# Patient Record
Sex: FEMALE | Race: WHITE | Employment: OTHER | URBAN - METROPOLITAN AREA
[De-identification: names, ages, dates, MRNs, and addresses within clinical notes are randomized per-mention and may not be internally consistent; named-entity substitution may affect disease eponyms.]

---

## 2017-01-04 ENCOUNTER — OFFICE VISIT - HEALTHEAST (OUTPATIENT)
Dept: INTERNAL MEDICINE | Facility: CLINIC | Age: 62
End: 2017-01-04

## 2017-01-04 DIAGNOSIS — M85.80 OSTEOPENIA: ICD-10-CM

## 2017-01-04 DIAGNOSIS — E03.9 HYPOTHYROIDISM: ICD-10-CM

## 2017-01-04 DIAGNOSIS — J01.90 ACUTE SINUS INFECTION: ICD-10-CM

## 2017-01-04 DIAGNOSIS — R53.83 FATIGUE: ICD-10-CM

## 2017-01-04 ASSESSMENT — MIFFLIN-ST. JEOR: SCORE: 1334.92

## 2017-01-06 ENCOUNTER — COMMUNICATION - HEALTHEAST (OUTPATIENT)
Dept: INTERNAL MEDICINE | Facility: CLINIC | Age: 62
End: 2017-01-06

## 2017-01-10 ENCOUNTER — RECORDS - HEALTHEAST (OUTPATIENT)
Dept: ADMINISTRATIVE | Facility: OTHER | Age: 62
End: 2017-01-10

## 2017-01-10 ENCOUNTER — HOSPITAL ENCOUNTER (OUTPATIENT)
Dept: MAMMOGRAPHY | Facility: CLINIC | Age: 62
Discharge: HOME | End: 2017-01-10
Attending: OBSTETRICS & GYNECOLOGY | Admitting: OBSTETRICS & GYNECOLOGY
Payer: COMMERCIAL

## 2017-01-10 DIAGNOSIS — Z12.31 VISIT FOR SCREENING MAMMOGRAM: ICD-10-CM

## 2017-01-10 PROCEDURE — 77063 BREAST TOMOSYNTHESIS BI: CPT

## 2017-02-08 ENCOUNTER — AMBULATORY - HEALTHEAST (OUTPATIENT)
Dept: NURSING | Facility: CLINIC | Age: 62
End: 2017-02-08

## 2017-02-13 ENCOUNTER — RECORDS - HEALTHEAST (OUTPATIENT)
Dept: ADMINISTRATIVE | Facility: OTHER | Age: 62
End: 2017-02-13

## 2017-02-13 ENCOUNTER — RECORDS - HEALTHEAST (OUTPATIENT)
Dept: BONE DENSITY | Facility: CLINIC | Age: 62
End: 2017-02-13

## 2017-02-13 DIAGNOSIS — M85.80 OTHER SPECIFIED DISORDERS OF BONE DENSITY AND STRUCTURE, UNSPECIFIED SITE: ICD-10-CM

## 2017-02-20 ENCOUNTER — COMMUNICATION - HEALTHEAST (OUTPATIENT)
Dept: INTERNAL MEDICINE | Facility: CLINIC | Age: 62
End: 2017-02-20

## 2017-02-27 ENCOUNTER — AMBULATORY - HEALTHEAST (OUTPATIENT)
Dept: INTERNAL MEDICINE | Facility: CLINIC | Age: 62
End: 2017-02-27

## 2017-02-27 ENCOUNTER — COMMUNICATION - HEALTHEAST (OUTPATIENT)
Dept: INTERNAL MEDICINE | Facility: CLINIC | Age: 62
End: 2017-02-27

## 2017-03-03 ENCOUNTER — AMBULATORY - HEALTHEAST (OUTPATIENT)
Dept: NURSING | Facility: CLINIC | Age: 62
End: 2017-03-03

## 2017-03-24 ENCOUNTER — AMBULATORY - HEALTHEAST (OUTPATIENT)
Dept: NURSING | Facility: CLINIC | Age: 62
End: 2017-03-24

## 2017-04-24 ENCOUNTER — TRANSFERRED RECORDS (OUTPATIENT)
Dept: HEALTH INFORMATION MANAGEMENT | Facility: CLINIC | Age: 62
End: 2017-04-24

## 2017-04-24 ENCOUNTER — RECORDS - HEALTHEAST (OUTPATIENT)
Dept: ADMINISTRATIVE | Facility: OTHER | Age: 62
End: 2017-04-24

## 2017-04-25 ENCOUNTER — TRANSFERRED RECORDS (OUTPATIENT)
Dept: FAMILY MEDICINE | Facility: CLINIC | Age: 62
End: 2017-04-25

## 2017-04-27 ENCOUNTER — RECORDS - HEALTHEAST (OUTPATIENT)
Dept: ADMINISTRATIVE | Facility: OTHER | Age: 62
End: 2017-04-27

## 2017-05-05 ENCOUNTER — OFFICE VISIT - HEALTHEAST (OUTPATIENT)
Dept: INTERNAL MEDICINE | Facility: CLINIC | Age: 62
End: 2017-05-05

## 2017-05-05 DIAGNOSIS — I49.9 IRREGULAR HEART BEAT: ICD-10-CM

## 2017-05-05 DIAGNOSIS — E03.9 HYPOTHYROIDISM: ICD-10-CM

## 2017-05-05 DIAGNOSIS — R06.00 DYSPNEA: ICD-10-CM

## 2017-05-05 LAB
CHOLEST SERPL-MCNC: 245 MG/DL
FASTING STATUS PATIENT QL REPORTED: YES
HDLC SERPL-MCNC: 53 MG/DL
LDLC SERPL CALC-MCNC: 147 MG/DL
TRIGL SERPL-MCNC: 223 MG/DL

## 2017-05-05 ASSESSMENT — MIFFLIN-ST. JEOR: SCORE: 1375.38

## 2017-05-06 LAB
ATRIAL RATE - MUSE: 99 BPM
DIASTOLIC BLOOD PRESSURE - MUSE: NORMAL MMHG
INTERPRETATION ECG - MUSE: NORMAL
P AXIS - MUSE: 73 DEGREES
PR INTERVAL - MUSE: 160 MS
QRS DURATION - MUSE: 90 MS
QT - MUSE: 402 MS
QTC - MUSE: 515 MS
R AXIS - MUSE: -21 DEGREES
SYSTOLIC BLOOD PRESSURE - MUSE: NORMAL MMHG
T AXIS - MUSE: 84 DEGREES
VENTRICULAR RATE- MUSE: 99 BPM

## 2017-05-08 ENCOUNTER — OFFICE VISIT - HEALTHEAST (OUTPATIENT)
Dept: CARDIOLOGY | Facility: CLINIC | Age: 62
End: 2017-05-08

## 2017-05-08 ENCOUNTER — COMMUNICATION - HEALTHEAST (OUTPATIENT)
Dept: INTERNAL MEDICINE | Facility: CLINIC | Age: 62
End: 2017-05-08

## 2017-05-08 DIAGNOSIS — E78.5 HYPERLIPIDEMIA: ICD-10-CM

## 2017-05-08 DIAGNOSIS — I20.9 ISCHEMIC CHEST PAIN (H): ICD-10-CM

## 2017-05-08 DIAGNOSIS — I10 ESSENTIAL HYPERTENSION: ICD-10-CM

## 2017-05-08 DIAGNOSIS — R00.2 HEART PALPITATIONS: ICD-10-CM

## 2017-05-08 ASSESSMENT — MIFFLIN-ST. JEOR: SCORE: 1366.31

## 2017-05-11 ENCOUNTER — COMMUNICATION - HEALTHEAST (OUTPATIENT)
Dept: INTERNAL MEDICINE | Facility: CLINIC | Age: 62
End: 2017-05-11

## 2017-05-15 ENCOUNTER — HOSPITAL ENCOUNTER (OUTPATIENT)
Dept: CARDIOLOGY | Facility: CLINIC | Age: 62
Discharge: HOME | End: 2017-05-15
Attending: INTERNAL MEDICINE

## 2017-05-15 DIAGNOSIS — I10 ESSENTIAL HYPERTENSION: ICD-10-CM

## 2017-05-15 DIAGNOSIS — I20.9 ISCHEMIC CHEST PAIN (H): ICD-10-CM

## 2017-05-15 DIAGNOSIS — R00.2 HEART PALPITATIONS: ICD-10-CM

## 2017-05-15 DIAGNOSIS — E78.5 HYPERLIPIDEMIA: ICD-10-CM

## 2017-05-16 LAB
CV STRESS CURRENT BP HE: NORMAL
CV STRESS CURRENT HR HE: 100
CV STRESS CURRENT HR HE: 100
CV STRESS CURRENT HR HE: 101
CV STRESS CURRENT HR HE: 101
CV STRESS CURRENT HR HE: 103
CV STRESS CURRENT HR HE: 103
CV STRESS CURRENT HR HE: 110
CV STRESS CURRENT HR HE: 114
CV STRESS CURRENT HR HE: 116
CV STRESS CURRENT HR HE: 116
CV STRESS CURRENT HR HE: 120
CV STRESS CURRENT HR HE: 124
CV STRESS CURRENT HR HE: 125
CV STRESS CURRENT HR HE: 131
CV STRESS CURRENT HR HE: 132
CV STRESS CURRENT HR HE: 135
CV STRESS CURRENT HR HE: 136
CV STRESS CURRENT HR HE: 137
CV STRESS CURRENT HR HE: 140
CV STRESS CURRENT HR HE: 146
CV STRESS CURRENT HR HE: 147
CV STRESS CURRENT HR HE: 148
CV STRESS CURRENT HR HE: 155
CV STRESS CURRENT HR HE: 95
CV STRESS CURRENT HR HE: 96
CV STRESS CURRENT HR HE: 96
CV STRESS DEVIATION TIME HE: NORMAL
CV STRESS ECHO PERCENT HR HE: NORMAL
CV STRESS EXERCISE STAGE HE: NORMAL
CV STRESS FINAL RESTING BP HE: NORMAL
CV STRESS FINAL RESTING HR HE: 95
CV STRESS MAX HR HE: 155
CV STRESS MAX TREADMILL GRADE HE: 14
CV STRESS MAX TREADMILL SPEED HE: 3.4
CV STRESS PEAK DIA BP HE: NORMAL
CV STRESS PEAK SYS BP HE: NORMAL
CV STRESS PHASE HE: NORMAL
CV STRESS PROTOCOL HE: NORMAL
CV STRESS RESTING PT POSITION HE: NORMAL
CV STRESS RESTING PT POSITION HE: NORMAL
CV STRESS ST DEVIATION AMOUNT HE: NORMAL
CV STRESS ST DEVIATION ELEVATION HE: NORMAL
CV STRESS ST EVELATION AMOUNT HE: NORMAL
CV STRESS TEST TYPE HE: NORMAL
CV STRESS TOTAL STAGE TIME MIN 1 HE: NORMAL
ECHO EJECTION FRACTION ESTIMATED: 60 %
STRESS ECHO BASELINE BP: NORMAL
STRESS ECHO BASELINE HR: 112
STRESS ECHO CALCULATED PERCENT HR: 97 %
STRESS ECHO LAST STRESS BP: NORMAL
STRESS ECHO LAST STRESS HR: 146
STRESS ECHO POST ESTIMATED WORKLOAD: 8.5
STRESS ECHO POST EXERCISE DUR MIN: 7
STRESS ECHO POST EXERCISE DUR SEC: 0
STRESS ECHO TARGET HR: 135

## 2017-05-17 ENCOUNTER — AMBULATORY - HEALTHEAST (OUTPATIENT)
Dept: CARDIOLOGY | Facility: CLINIC | Age: 62
End: 2017-05-17

## 2017-05-17 DIAGNOSIS — I47.19 ATRIAL TACHYCARDIA DETERMINED BY ELECTROCARDIOGRAPHY (H): ICD-10-CM

## 2017-05-19 ENCOUNTER — OFFICE VISIT - HEALTHEAST (OUTPATIENT)
Dept: INTERNAL MEDICINE | Facility: CLINIC | Age: 62
End: 2017-05-19

## 2017-05-19 DIAGNOSIS — M54.9 BACK PAIN: ICD-10-CM

## 2017-05-19 DIAGNOSIS — I49.8 ATRIAL ARRHYTHMIA: ICD-10-CM

## 2017-05-19 ASSESSMENT — MIFFLIN-ST. JEOR: SCORE: 1375.38

## 2017-06-06 ENCOUNTER — RECORDS - HEALTHEAST (OUTPATIENT)
Dept: ADMINISTRATIVE | Facility: OTHER | Age: 62
End: 2017-06-06

## 2017-06-06 ENCOUNTER — AMBULATORY - HEALTHEAST (OUTPATIENT)
Dept: NURSING | Facility: CLINIC | Age: 62
End: 2017-06-06

## 2017-06-07 ENCOUNTER — COMMUNICATION - HEALTHEAST (OUTPATIENT)
Dept: INTERNAL MEDICINE | Facility: CLINIC | Age: 62
End: 2017-06-07

## 2017-06-09 ENCOUNTER — RECORDS - HEALTHEAST (OUTPATIENT)
Dept: ADMINISTRATIVE | Facility: OTHER | Age: 62
End: 2017-06-09

## 2017-06-19 ENCOUNTER — COMMUNICATION - HEALTHEAST (OUTPATIENT)
Dept: INTERNAL MEDICINE | Facility: CLINIC | Age: 62
End: 2017-06-19

## 2017-06-19 ENCOUNTER — OFFICE VISIT - HEALTHEAST (OUTPATIENT)
Dept: INTERNAL MEDICINE | Facility: CLINIC | Age: 62
End: 2017-06-19

## 2017-06-19 DIAGNOSIS — I47.19 ATRIAL TACHYCARDIA DETERMINED BY ELECTROCARDIOGRAPHY (H): ICD-10-CM

## 2017-06-19 DIAGNOSIS — K59.00 CONSTIPATION: ICD-10-CM

## 2017-06-19 DIAGNOSIS — I49.8 ATRIAL ARRHYTHMIA: ICD-10-CM

## 2017-06-19 ASSESSMENT — MIFFLIN-ST. JEOR: SCORE: 1357.42

## 2017-06-24 ENCOUNTER — COMMUNICATION - HEALTHEAST (OUTPATIENT)
Dept: INTERNAL MEDICINE | Facility: CLINIC | Age: 62
End: 2017-06-24

## 2017-07-07 ENCOUNTER — AMBULATORY - HEALTHEAST (OUTPATIENT)
Dept: NURSING | Facility: CLINIC | Age: 62
End: 2017-07-07

## 2017-07-07 ENCOUNTER — COMMUNICATION - HEALTHEAST (OUTPATIENT)
Dept: INTERNAL MEDICINE | Facility: CLINIC | Age: 62
End: 2017-07-07

## 2017-07-12 ENCOUNTER — COMMUNICATION - HEALTHEAST (OUTPATIENT)
Dept: INTERNAL MEDICINE | Facility: CLINIC | Age: 62
End: 2017-07-12

## 2017-07-17 ENCOUNTER — RECORDS - HEALTHEAST (OUTPATIENT)
Dept: ADMINISTRATIVE | Facility: OTHER | Age: 62
End: 2017-07-17

## 2017-08-02 ENCOUNTER — COMMUNICATION - HEALTHEAST (OUTPATIENT)
Dept: INTERNAL MEDICINE | Facility: CLINIC | Age: 62
End: 2017-08-02

## 2017-08-03 ENCOUNTER — RECORDS - HEALTHEAST (OUTPATIENT)
Dept: ADMINISTRATIVE | Facility: OTHER | Age: 62
End: 2017-08-03

## 2017-08-03 ENCOUNTER — COMMUNICATION - HEALTHEAST (OUTPATIENT)
Dept: INTERNAL MEDICINE | Facility: CLINIC | Age: 62
End: 2017-08-03

## 2017-08-09 ENCOUNTER — AMBULATORY - HEALTHEAST (OUTPATIENT)
Dept: NURSING | Facility: CLINIC | Age: 62
End: 2017-08-09

## 2017-08-10 ENCOUNTER — OFFICE VISIT - HEALTHEAST (OUTPATIENT)
Dept: INTERNAL MEDICINE | Facility: CLINIC | Age: 62
End: 2017-08-10

## 2017-08-10 DIAGNOSIS — J02.0 STREP PHARYNGITIS: ICD-10-CM

## 2017-08-10 ASSESSMENT — MIFFLIN-ST. JEOR: SCORE: 1379.92

## 2017-09-01 ENCOUNTER — OFFICE VISIT - HEALTHEAST (OUTPATIENT)
Dept: INTERNAL MEDICINE | Facility: CLINIC | Age: 62
End: 2017-09-01

## 2017-09-01 DIAGNOSIS — K21.9 GERD (GASTROESOPHAGEAL REFLUX DISEASE): ICD-10-CM

## 2017-09-01 DIAGNOSIS — E03.9 HYPOTHYROIDISM: ICD-10-CM

## 2017-09-01 DIAGNOSIS — R82.90 MALODOROUS URINE: ICD-10-CM

## 2017-09-01 DIAGNOSIS — Z78.0 POSTMENOPAUSAL: ICD-10-CM

## 2017-09-01 DIAGNOSIS — I47.19 ATRIAL TACHYCARDIA DETERMINED BY ELECTROCARDIOGRAPHY (H): ICD-10-CM

## 2017-09-01 DIAGNOSIS — I49.9 IRREGULAR HEART BEAT: ICD-10-CM

## 2017-09-01 DIAGNOSIS — I47.10 SVT (SUPRAVENTRICULAR TACHYCARDIA) (H): ICD-10-CM

## 2017-09-01 ASSESSMENT — MIFFLIN-ST. JEOR: SCORE: 1375.75

## 2017-09-08 ENCOUNTER — AMBULATORY - HEALTHEAST (OUTPATIENT)
Dept: NURSING | Facility: CLINIC | Age: 62
End: 2017-09-08

## 2017-09-26 ENCOUNTER — RECORDS - HEALTHEAST (OUTPATIENT)
Dept: ADMINISTRATIVE | Facility: OTHER | Age: 62
End: 2017-09-26

## 2017-10-17 ENCOUNTER — AMBULATORY - HEALTHEAST (OUTPATIENT)
Dept: NURSING | Facility: CLINIC | Age: 62
End: 2017-10-17

## 2017-10-17 ENCOUNTER — AMBULATORY - HEALTHEAST (OUTPATIENT)
Dept: INTERNAL MEDICINE | Facility: CLINIC | Age: 62
End: 2017-10-17

## 2017-11-08 ENCOUNTER — AMBULATORY - HEALTHEAST (OUTPATIENT)
Dept: NURSING | Facility: CLINIC | Age: 62
End: 2017-11-08

## 2017-12-01 ENCOUNTER — OFFICE VISIT - HEALTHEAST (OUTPATIENT)
Dept: INTERNAL MEDICINE | Facility: CLINIC | Age: 62
End: 2017-12-01

## 2017-12-01 DIAGNOSIS — I47.10 SVT (SUPRAVENTRICULAR TACHYCARDIA) (H): ICD-10-CM

## 2017-12-01 DIAGNOSIS — R40.0 DAYTIME SLEEPINESS: ICD-10-CM

## 2017-12-01 DIAGNOSIS — I49.9 IRREGULAR HEART BEAT: ICD-10-CM

## 2017-12-01 DIAGNOSIS — E78.5 HYPERLIPIDEMIA: ICD-10-CM

## 2017-12-01 DIAGNOSIS — G47.00 INSOMNIA: ICD-10-CM

## 2017-12-01 LAB
CHOLEST SERPL-MCNC: 288 MG/DL
FASTING STATUS PATIENT QL REPORTED: YES
HDLC SERPL-MCNC: 60 MG/DL
LDLC SERPL CALC-MCNC: 197 MG/DL
TRIGL SERPL-MCNC: 156 MG/DL

## 2017-12-01 ASSESSMENT — MIFFLIN-ST. JEOR: SCORE: 1362.14

## 2017-12-02 ENCOUNTER — COMMUNICATION - HEALTHEAST (OUTPATIENT)
Dept: INTERNAL MEDICINE | Facility: CLINIC | Age: 62
End: 2017-12-02

## 2017-12-02 DIAGNOSIS — K21.9 GERD (GASTROESOPHAGEAL REFLUX DISEASE): ICD-10-CM

## 2017-12-06 ENCOUNTER — COMMUNICATION - HEALTHEAST (OUTPATIENT)
Dept: INTERNAL MEDICINE | Facility: CLINIC | Age: 62
End: 2017-12-06

## 2017-12-07 ENCOUNTER — AMBULATORY - HEALTHEAST (OUTPATIENT)
Dept: INTERNAL MEDICINE | Facility: CLINIC | Age: 62
End: 2017-12-07

## 2017-12-07 ENCOUNTER — AMBULATORY - HEALTHEAST (OUTPATIENT)
Dept: NURSING | Facility: CLINIC | Age: 62
End: 2017-12-07

## 2017-12-18 ENCOUNTER — AMBULATORY - HEALTHEAST (OUTPATIENT)
Dept: INTERNAL MEDICINE | Facility: CLINIC | Age: 62
End: 2017-12-18

## 2017-12-18 ENCOUNTER — COMMUNICATION - HEALTHEAST (OUTPATIENT)
Dept: INTERNAL MEDICINE | Facility: CLINIC | Age: 62
End: 2017-12-18

## 2017-12-18 DIAGNOSIS — I49.9 CARDIAC ARRHYTHMIA: ICD-10-CM

## 2018-01-02 ENCOUNTER — AMBULATORY - HEALTHEAST (OUTPATIENT)
Dept: NURSING | Facility: CLINIC | Age: 63
End: 2018-01-02

## 2018-01-05 ENCOUNTER — OFFICE VISIT - HEALTHEAST (OUTPATIENT)
Dept: INTERNAL MEDICINE | Facility: CLINIC | Age: 63
End: 2018-01-05

## 2018-01-05 DIAGNOSIS — R07.89 ATYPICAL CHEST PAIN: ICD-10-CM

## 2018-01-05 DIAGNOSIS — I47.19 ATRIAL TACHYCARDIA DETERMINED BY ELECTROCARDIOGRAPHY (H): ICD-10-CM

## 2018-01-05 ASSESSMENT — MIFFLIN-ST. JEOR: SCORE: 1362.32

## 2018-01-06 ENCOUNTER — COMMUNICATION - HEALTHEAST (OUTPATIENT)
Dept: INTERNAL MEDICINE | Facility: CLINIC | Age: 63
End: 2018-01-06

## 2018-01-08 LAB
ATRIAL RATE - MUSE: 68 BPM
DIASTOLIC BLOOD PRESSURE - MUSE: NORMAL MMHG
INTERPRETATION ECG - MUSE: NORMAL
P AXIS - MUSE: 77 DEGREES
PR INTERVAL - MUSE: 180 MS
QRS DURATION - MUSE: 98 MS
QT - MUSE: 422 MS
QTC - MUSE: 448 MS
R AXIS - MUSE: -13 DEGREES
SYSTOLIC BLOOD PRESSURE - MUSE: NORMAL MMHG
T AXIS - MUSE: 57 DEGREES
VENTRICULAR RATE- MUSE: 68 BPM

## 2018-01-09 ENCOUNTER — COMMUNICATION - HEALTHEAST (OUTPATIENT)
Dept: INTERNAL MEDICINE | Facility: CLINIC | Age: 63
End: 2018-01-09

## 2018-02-01 ENCOUNTER — COMMUNICATION - HEALTHEAST (OUTPATIENT)
Dept: INTERNAL MEDICINE | Facility: CLINIC | Age: 63
End: 2018-02-01

## 2018-02-09 ENCOUNTER — AMBULATORY - HEALTHEAST (OUTPATIENT)
Dept: NURSING | Facility: CLINIC | Age: 63
End: 2018-02-09

## 2018-02-09 ENCOUNTER — COMMUNICATION - HEALTHEAST (OUTPATIENT)
Dept: INTERNAL MEDICINE | Facility: CLINIC | Age: 63
End: 2018-02-09

## 2018-02-11 ENCOUNTER — RECORDS - HEALTHEAST (OUTPATIENT)
Dept: ADMINISTRATIVE | Facility: OTHER | Age: 63
End: 2018-02-11

## 2018-02-13 ENCOUNTER — TELEPHONE (OUTPATIENT)
Dept: FAMILY MEDICINE | Facility: CLINIC | Age: 63
End: 2018-02-13

## 2018-02-13 DIAGNOSIS — R79.89 LOW VITAMIN D LEVEL: Primary | ICD-10-CM

## 2018-02-13 NOTE — TELEPHONE ENCOUNTER
Ellie calling from [phone]    Pt prefers to get her B12 injections at [facility] location now that we are combined with [facility]    Discussed with Grace, given approval  Can pull info through care everwhere    Last B12 injection was 2.9.18, ordered q 30 days    Her PCP is Dr Perez Mathew, he will fax order over to have [facility] give pt her injections monthly  They will follow for labs etc    FYI to Krystin Luu RN, BS  Clinical Nurse Triage.

## 2018-02-14 ENCOUNTER — RECORDS - HEALTHEAST (OUTPATIENT)
Dept: ADMINISTRATIVE | Facility: OTHER | Age: 63
End: 2018-02-14

## 2018-02-16 RX ORDER — CYANOCOBALAMIN 1000 UG/ML
1 INJECTION, SOLUTION INTRAMUSCULAR; SUBCUTANEOUS
Qty: 1 ML | Refills: 11 | OUTPATIENT
Start: 2018-02-16 | End: 2019-02-16

## 2018-02-16 NOTE — TELEPHONE ENCOUNTER
Fax from [facility].  Phone [phone] Fax [phone].  Standing order for 1 year from Dr. Perez Leroy for Vit B12 1000 mcg injection IM every 30 days.  Has diagnosis of low vitamin d level as diagnosis.  Order entered and put in abstracting bin.  Hetal Francis RN

## 2018-02-22 ENCOUNTER — RECORDS - HEALTHEAST (OUTPATIENT)
Dept: ADMINISTRATIVE | Facility: OTHER | Age: 63
End: 2018-02-22

## 2018-03-02 ENCOUNTER — COMMUNICATION - HEALTHEAST (OUTPATIENT)
Dept: INTERNAL MEDICINE | Facility: CLINIC | Age: 63
End: 2018-03-02

## 2018-03-04 ENCOUNTER — COMMUNICATION - HEALTHEAST (OUTPATIENT)
Dept: INTERNAL MEDICINE | Facility: CLINIC | Age: 63
End: 2018-03-04

## 2018-03-05 ENCOUNTER — ALLIED HEALTH/NURSE VISIT (OUTPATIENT)
Dept: NURSING | Facility: CLINIC | Age: 63
End: 2018-03-05
Payer: COMMERCIAL

## 2018-03-05 DIAGNOSIS — E53.8 VITAMIN B12 DEFICIENCY (NON ANEMIC): Primary | ICD-10-CM

## 2018-03-05 PROCEDURE — 99207 ZZC NO CHARGE NURSE ONLY: CPT

## 2018-03-05 PROCEDURE — 96372 THER/PROPH/DIAG INJ SC/IM: CPT

## 2018-03-06 ENCOUNTER — COMMUNICATION - HEALTHEAST (OUTPATIENT)
Dept: INTERNAL MEDICINE | Facility: CLINIC | Age: 63
End: 2018-03-06

## 2018-03-19 ENCOUNTER — COMMUNICATION - HEALTHEAST (OUTPATIENT)
Dept: INTERNAL MEDICINE | Facility: CLINIC | Age: 63
End: 2018-03-19

## 2018-03-19 DIAGNOSIS — Z78.0 POSTMENOPAUSAL: ICD-10-CM

## 2018-03-21 ENCOUNTER — COMMUNICATION - HEALTHEAST (OUTPATIENT)
Dept: INTERNAL MEDICINE | Facility: CLINIC | Age: 63
End: 2018-03-21

## 2018-03-21 ENCOUNTER — OFFICE VISIT - HEALTHEAST (OUTPATIENT)
Dept: INTERNAL MEDICINE | Facility: CLINIC | Age: 63
End: 2018-03-21

## 2018-03-21 DIAGNOSIS — M65.949 TENOSYNOVITIS OF FINGER: ICD-10-CM

## 2018-03-21 DIAGNOSIS — Z78.0 POSTMENOPAUSAL: ICD-10-CM

## 2018-03-21 DIAGNOSIS — I47.10 SVT (SUPRAVENTRICULAR TACHYCARDIA) (H): ICD-10-CM

## 2018-03-21 LAB
BASOPHILS # BLD AUTO: 0 THOU/UL (ref 0–0.2)
BASOPHILS NFR BLD AUTO: 1 % (ref 0–2)
DEPRECATED S PYO AG THROAT QL EIA: NORMAL
EOSINOPHIL # BLD AUTO: 0.2 THOU/UL (ref 0–0.4)
EOSINOPHIL NFR BLD AUTO: 4 % (ref 0–6)
ERYTHROCYTE [DISTWIDTH] IN BLOOD BY AUTOMATED COUNT: 12.4 % (ref 11–14.5)
ERYTHROCYTE [SEDIMENTATION RATE] IN BLOOD BY WESTERGREN METHOD: 23 MM/HR (ref 0–20)
HCT VFR BLD AUTO: 39.7 % (ref 35–47)
HGB BLD-MCNC: 13.2 G/DL (ref 12–16)
LYMPHOCYTES # BLD AUTO: 2.4 THOU/UL (ref 0.8–4.4)
LYMPHOCYTES NFR BLD AUTO: 41 % (ref 20–40)
MCH RBC QN AUTO: 29.4 PG (ref 27–34)
MCHC RBC AUTO-ENTMCNC: 33.1 G/DL (ref 32–36)
MCV RBC AUTO: 89 FL (ref 80–100)
MONOCYTES # BLD AUTO: 0.4 THOU/UL (ref 0–0.9)
MONOCYTES NFR BLD AUTO: 7 % (ref 2–10)
NEUTROPHILS # BLD AUTO: 2.8 THOU/UL (ref 2–7.7)
NEUTROPHILS NFR BLD AUTO: 48 % (ref 50–70)
PLATELET # BLD AUTO: 247 THOU/UL (ref 140–440)
PMV BLD AUTO: 7.6 FL (ref 7–10)
RBC # BLD AUTO: 4.47 MILL/UL (ref 3.8–5.4)
WBC: 5.8 THOU/UL (ref 4–11)

## 2018-03-22 LAB — GROUP A STREP BY PCR: NORMAL

## 2018-04-06 ENCOUNTER — AMBULATORY - HEALTHEAST (OUTPATIENT)
Dept: NURSING | Facility: CLINIC | Age: 63
End: 2018-04-06

## 2018-05-08 ENCOUNTER — RECORDS - HEALTHEAST (OUTPATIENT)
Dept: ADMINISTRATIVE | Facility: OTHER | Age: 63
End: 2018-05-08

## 2018-05-09 ENCOUNTER — RECORDS - HEALTHEAST (OUTPATIENT)
Dept: ADMINISTRATIVE | Facility: OTHER | Age: 63
End: 2018-05-09

## 2018-05-09 ENCOUNTER — HOSPITAL ENCOUNTER (OUTPATIENT)
Dept: MAMMOGRAPHY | Facility: CLINIC | Age: 63
Discharge: HOME | End: 2018-05-09
Attending: INTERNAL MEDICINE | Admitting: INTERNAL MEDICINE
Payer: COMMERCIAL

## 2018-05-09 DIAGNOSIS — Z12.31 VISIT FOR SCREENING MAMMOGRAM: ICD-10-CM

## 2018-05-09 PROCEDURE — 77067 SCR MAMMO BI INCL CAD: CPT

## 2018-05-11 ENCOUNTER — OFFICE VISIT - HEALTHEAST (OUTPATIENT)
Dept: INTERNAL MEDICINE | Facility: CLINIC | Age: 63
End: 2018-05-11

## 2018-05-11 DIAGNOSIS — I47.10 SVT (SUPRAVENTRICULAR TACHYCARDIA) (H): ICD-10-CM

## 2018-05-11 DIAGNOSIS — Z78.0 POSTMENOPAUSAL: ICD-10-CM

## 2018-05-11 DIAGNOSIS — R53.81 GENERAL PHYSICAL DETERIORATION: ICD-10-CM

## 2018-05-11 DIAGNOSIS — E03.9 HYPOTHYROIDISM: ICD-10-CM

## 2018-05-11 LAB
ALBUMIN SERPL-MCNC: 3.6 G/DL (ref 3.5–5)
ALBUMIN UR-MCNC: NEGATIVE MG/DL
ALP SERPL-CCNC: 86 U/L (ref 45–120)
ALT SERPL W P-5'-P-CCNC: 26 U/L (ref 0–45)
ANION GAP SERPL CALCULATED.3IONS-SCNC: 8 MMOL/L (ref 5–18)
APPEARANCE UR: CLEAR
AST SERPL W P-5'-P-CCNC: 27 U/L (ref 0–40)
BILIRUB DIRECT SERPL-MCNC: 0.1 MG/DL
BILIRUB SERPL-MCNC: 0.3 MG/DL (ref 0–1)
BILIRUB UR QL STRIP: NEGATIVE
BUN SERPL-MCNC: 11 MG/DL (ref 8–22)
CALCIUM SERPL-MCNC: 9.6 MG/DL (ref 8.5–10.5)
CHLORIDE BLD-SCNC: 105 MMOL/L (ref 98–107)
CO2 SERPL-SCNC: 27 MMOL/L (ref 22–31)
COLOR UR AUTO: YELLOW
CREAT SERPL-MCNC: 0.63 MG/DL (ref 0.6–1.1)
ERYTHROCYTE [SEDIMENTATION RATE] IN BLOOD BY WESTERGREN METHOD: 8 MM/HR (ref 0–20)
GFR SERPL CREATININE-BSD FRML MDRD: >60 ML/MIN/1.73M2
GLUCOSE BLD-MCNC: 96 MG/DL (ref 70–125)
GLUCOSE UR STRIP-MCNC: NEGATIVE MG/DL
HGB UR QL STRIP: NEGATIVE
KETONES UR STRIP-MCNC: NEGATIVE MG/DL
LEUKOCYTE ESTERASE UR QL STRIP: NEGATIVE
NITRATE UR QL: NEGATIVE
PH UR STRIP: 5.5 [PH] (ref 4.5–8)
POTASSIUM BLD-SCNC: 4.2 MMOL/L (ref 3.5–5)
PROT SERPL-MCNC: 7.1 G/DL (ref 6–8)
SODIUM SERPL-SCNC: 140 MMOL/L (ref 136–145)
SP GR UR STRIP: 1.01 (ref 1–1.03)
T4 FREE SERPL-MCNC: 1.2 NG/DL (ref 0.7–1.8)
TSH SERPL DL<=0.005 MIU/L-ACNC: 0.14 UIU/ML (ref 0.3–5)
UROBILINOGEN UR STRIP-ACNC: NORMAL

## 2018-05-11 ASSESSMENT — MIFFLIN-ST. JEOR: SCORE: 1362.32

## 2018-05-13 ENCOUNTER — AMBULATORY - HEALTHEAST (OUTPATIENT)
Dept: INTERNAL MEDICINE | Facility: CLINIC | Age: 63
End: 2018-05-13

## 2018-05-13 DIAGNOSIS — E03.9 HYPOTHYROIDISM: ICD-10-CM

## 2018-05-14 LAB — 25(OH)D3 SERPL-MCNC: 34.6 NG/ML (ref 30–80)

## 2018-05-15 ENCOUNTER — AMBULATORY - HEALTHEAST (OUTPATIENT)
Dept: INTERNAL MEDICINE | Facility: CLINIC | Age: 63
End: 2018-05-15

## 2018-05-15 ENCOUNTER — COMMUNICATION - HEALTHEAST (OUTPATIENT)
Dept: INTERNAL MEDICINE | Facility: CLINIC | Age: 63
End: 2018-05-15

## 2018-05-15 DIAGNOSIS — E03.9 HYPOTHYROIDISM: ICD-10-CM

## 2018-05-25 ENCOUNTER — COMMUNICATION - HEALTHEAST (OUTPATIENT)
Dept: INTERNAL MEDICINE | Facility: CLINIC | Age: 63
End: 2018-05-25

## 2018-05-25 DIAGNOSIS — Z78.0 POSTMENOPAUSAL: ICD-10-CM

## 2018-06-11 ENCOUNTER — RECORDS - HEALTHEAST (OUTPATIENT)
Dept: ADMINISTRATIVE | Facility: OTHER | Age: 63
End: 2018-06-11

## 2018-06-11 ENCOUNTER — AMBULATORY - HEALTHEAST (OUTPATIENT)
Dept: NURSING | Facility: CLINIC | Age: 63
End: 2018-06-11

## 2018-06-11 ENCOUNTER — AMBULATORY - HEALTHEAST (OUTPATIENT)
Dept: LAB | Facility: CLINIC | Age: 63
End: 2018-06-11

## 2018-06-11 DIAGNOSIS — E03.9 HYPOTHYROIDISM: ICD-10-CM

## 2018-06-11 LAB — TSH SERPL DL<=0.005 MIU/L-ACNC: 0.69 UIU/ML (ref 0.3–5)

## 2018-06-29 ENCOUNTER — COMMUNICATION - HEALTHEAST (OUTPATIENT)
Dept: INTERNAL MEDICINE | Facility: CLINIC | Age: 63
End: 2018-06-29

## 2018-07-10 ENCOUNTER — AMBULATORY - HEALTHEAST (OUTPATIENT)
Dept: NURSING | Facility: CLINIC | Age: 63
End: 2018-07-10

## 2018-07-16 ENCOUNTER — COMMUNICATION - HEALTHEAST (OUTPATIENT)
Dept: INTERNAL MEDICINE | Facility: CLINIC | Age: 63
End: 2018-07-16

## 2018-08-06 ENCOUNTER — COMMUNICATION - HEALTHEAST (OUTPATIENT)
Dept: SCHEDULING | Facility: CLINIC | Age: 63
End: 2018-08-06

## 2018-08-08 ENCOUNTER — OFFICE VISIT - HEALTHEAST (OUTPATIENT)
Dept: INTERNAL MEDICINE | Facility: CLINIC | Age: 63
End: 2018-08-08

## 2018-08-08 DIAGNOSIS — R00.2 PALPITATIONS: ICD-10-CM

## 2018-08-08 DIAGNOSIS — R03.0 ELEVATED BLOOD PRESSURE READING WITHOUT DIAGNOSIS OF HYPERTENSION: ICD-10-CM

## 2018-08-08 DIAGNOSIS — R23.2 HOT FLASHES: ICD-10-CM

## 2018-08-08 DIAGNOSIS — E03.9 HYPOTHYROIDISM: ICD-10-CM

## 2018-08-08 DIAGNOSIS — F41.9 ANXIETY: ICD-10-CM

## 2018-08-08 LAB
T3 SERPL-MCNC: 89 NG/DL (ref 45–175)
T4 FREE SERPL-MCNC: 1.2 NG/DL (ref 0.7–1.8)
TSH SERPL DL<=0.005 MIU/L-ACNC: 1.19 UIU/ML (ref 0.3–5)

## 2018-08-08 ASSESSMENT — MIFFLIN-ST. JEOR: SCORE: 1361.78

## 2018-08-10 ENCOUNTER — AMBULATORY - HEALTHEAST (OUTPATIENT)
Dept: LAB | Facility: CLINIC | Age: 63
End: 2018-08-10

## 2018-08-10 DIAGNOSIS — R03.0 ELEVATED BLOOD PRESSURE READING WITHOUT DIAGNOSIS OF HYPERTENSION: ICD-10-CM

## 2018-08-10 DIAGNOSIS — R00.2 PALPITATIONS: ICD-10-CM

## 2018-08-10 DIAGNOSIS — R23.2 HOT FLASHES: ICD-10-CM

## 2018-08-10 DIAGNOSIS — F41.9 ANXIETY: ICD-10-CM

## 2018-08-13 LAB
5HIAA & CREATININE UR-IMP: NORMAL
5OH-INDOLEACETATE 24H UR-MCNC: 2.2 MG/L
5OH-INDOLEACETATE 24H UR-MRATE: 4 MG/D (ref 0–15)
5OH-INDOLEACETATE/CREAT 24H UR: 5 MG/GCR (ref 0–14)
COLLECT DURATION TIME SPEC: 24 HR
COLLECT DURATION TIME SPEC: 24 HR
CREAT 24H UR-MRATE: 834 MG/D (ref 500–1400)
CREAT 24H UR-MRATE: 834 MG/D (ref 500–1400)
CREAT UR-MCNC: 44 MG/DL
CREAT UR-MCNC: 44 MG/DL
METANEPH 24H UR-MCNC: 71 UG/L
METANEPH 24H UR-MRATE: 135 UG/D (ref 39–143)
METANEPH+NORMETANEPH UR-IMP: NORMAL
METANEPH/CREAT 24H UR: 161 UG/G CRT (ref 0–300)
NORMETANEPHRINE 24H UR-MCNC: 157 UG/L
NORMETANEPHRINE 24H UR-MRATE: 298 UG/D (ref 109–393)
NORMETANEPHRINE/CREAT 24H UR: 357 UG/G CRT (ref 0–400)
SPECIMEN VOL ?TM UR: 1895 ML
SPECIMEN VOL ?TM UR: 1895 ML

## 2018-08-14 LAB
CATECHOLS UR-IMP: ABNORMAL
COLLECT DURATION TIME SPEC: 24 HR
CREAT 24H UR-MRATE: 834 MG/D (ref 500–1400)
CREAT UR-MCNC: 44 MG/DL
DOPAMINE 24H UR-MRATE: 184 UG/D (ref 77–324)
DOPAMINE UR-MCNC: 97 UG/L
DOPAMINE/CREAT UR: 220 UG/G CRT (ref 0–250)
EPINEPH 24H UR-MRATE: 8 UG/D (ref 1–7)
EPINEPH UR-MCNC: 4 UG/L
EPINEPH/CREAT UR: 9 UG/G CRT (ref 0–20)
NOREPINEPH 24H UR-MRATE: 49 UG/D (ref 16–71)
NOREPINEPH UR-MCNC: 26 UG/L
NOREPINEPH/CREAT UR: 59 UG/G CRT (ref 0–45)
SPECIMEN VOL ?TM UR: 1895 ML

## 2018-09-03 ENCOUNTER — COMMUNICATION - HEALTHEAST (OUTPATIENT)
Dept: INTERNAL MEDICINE | Facility: CLINIC | Age: 63
End: 2018-09-03

## 2018-09-10 ENCOUNTER — COMMUNICATION - HEALTHEAST (OUTPATIENT)
Dept: INTERNAL MEDICINE | Facility: CLINIC | Age: 63
End: 2018-09-10

## 2018-09-10 ENCOUNTER — OFFICE VISIT - HEALTHEAST (OUTPATIENT)
Dept: INTERNAL MEDICINE | Facility: CLINIC | Age: 63
End: 2018-09-10

## 2018-09-10 DIAGNOSIS — F43.21 GRIEF REACTION: ICD-10-CM

## 2018-09-10 DIAGNOSIS — R23.2 HOT FLASHES: ICD-10-CM

## 2018-09-10 DIAGNOSIS — R55 NEAR SYNCOPE: ICD-10-CM

## 2018-09-10 DIAGNOSIS — F41.8 SITUATIONAL ANXIETY: ICD-10-CM

## 2018-09-10 DIAGNOSIS — R00.2 PALPITATIONS: ICD-10-CM

## 2018-09-10 DIAGNOSIS — R55 VASOVAGAL SYNCOPE: ICD-10-CM

## 2018-09-10 ASSESSMENT — MIFFLIN-ST. JEOR: SCORE: 1335.11

## 2018-09-25 ENCOUNTER — COMMUNICATION - HEALTHEAST (OUTPATIENT)
Dept: INTERNAL MEDICINE | Facility: CLINIC | Age: 63
End: 2018-09-25

## 2018-09-25 DIAGNOSIS — Z78.0 POSTMENOPAUSAL: ICD-10-CM

## 2018-09-25 DIAGNOSIS — E78.5 HYPERLIPIDEMIA: ICD-10-CM

## 2018-10-01 ENCOUNTER — RECORDS - HEALTHEAST (OUTPATIENT)
Dept: ADMINISTRATIVE | Facility: OTHER | Age: 63
End: 2018-10-01

## 2018-10-24 ENCOUNTER — OFFICE VISIT - HEALTHEAST (OUTPATIENT)
Dept: INTERNAL MEDICINE | Facility: CLINIC | Age: 63
End: 2018-10-24

## 2018-10-24 DIAGNOSIS — R23.2 HOT FLASHES: ICD-10-CM

## 2018-10-24 DIAGNOSIS — Z23 NEED FOR PROPHYLACTIC VACCINATION AND INOCULATION AGAINST INFLUENZA: ICD-10-CM

## 2018-10-24 DIAGNOSIS — F43.21 GRIEF REACTION: ICD-10-CM

## 2018-10-24 DIAGNOSIS — Z78.0 POSTMENOPAUSAL: ICD-10-CM

## 2018-10-24 ASSESSMENT — MIFFLIN-ST. JEOR: SCORE: 1361.96

## 2018-11-11 ENCOUNTER — COMMUNICATION - HEALTHEAST (OUTPATIENT)
Dept: INTERNAL MEDICINE | Facility: CLINIC | Age: 63
End: 2018-11-11

## 2018-11-30 ENCOUNTER — AMBULATORY - HEALTHEAST (OUTPATIENT)
Dept: NURSING | Facility: CLINIC | Age: 63
End: 2018-11-30

## 2018-12-22 ENCOUNTER — COMMUNICATION - HEALTHEAST (OUTPATIENT)
Dept: INTERNAL MEDICINE | Facility: CLINIC | Age: 63
End: 2018-12-22

## 2018-12-24 ENCOUNTER — COMMUNICATION - HEALTHEAST (OUTPATIENT)
Dept: INTERNAL MEDICINE | Facility: CLINIC | Age: 63
End: 2018-12-24

## 2018-12-24 ENCOUNTER — AMBULATORY - HEALTHEAST (OUTPATIENT)
Dept: INTERNAL MEDICINE | Facility: CLINIC | Age: 63
End: 2018-12-24

## 2018-12-24 ENCOUNTER — COMMUNICATION - HEALTHEAST (OUTPATIENT)
Dept: SCHEDULING | Facility: CLINIC | Age: 63
End: 2018-12-24

## 2018-12-24 DIAGNOSIS — K21.9 GERD (GASTROESOPHAGEAL REFLUX DISEASE): ICD-10-CM

## 2018-12-26 ENCOUNTER — AMBULATORY - HEALTHEAST (OUTPATIENT)
Dept: NURSING | Facility: CLINIC | Age: 63
End: 2018-12-26

## 2019-01-16 ENCOUNTER — OFFICE VISIT - HEALTHEAST (OUTPATIENT)
Dept: INTERNAL MEDICINE | Facility: CLINIC | Age: 64
End: 2019-01-16

## 2019-01-16 DIAGNOSIS — R00.2 PALPITATIONS: ICD-10-CM

## 2019-01-16 DIAGNOSIS — N39.0 URINARY TRACT INFECTION WITHOUT HEMATURIA, SITE UNSPECIFIED: ICD-10-CM

## 2019-01-16 LAB
ALBUMIN SERPL-MCNC: 4 G/DL (ref 3.5–5)
ALBUMIN UR-MCNC: NEGATIVE MG/DL
ANION GAP SERPL CALCULATED.3IONS-SCNC: 11 MMOL/L (ref 5–18)
APPEARANCE UR: CLEAR
BILIRUB UR QL STRIP: NEGATIVE
BUN SERPL-MCNC: 12 MG/DL (ref 8–22)
CALCIUM SERPL-MCNC: 9.5 MG/DL (ref 8.5–10.5)
CHLORIDE BLD-SCNC: 105 MMOL/L (ref 98–107)
CO2 SERPL-SCNC: 24 MMOL/L (ref 22–31)
COLOR UR AUTO: YELLOW
CREAT SERPL-MCNC: 0.67 MG/DL (ref 0.6–1.1)
GFR SERPL CREATININE-BSD FRML MDRD: >60 ML/MIN/1.73M2
GLUCOSE BLD-MCNC: 94 MG/DL (ref 70–125)
GLUCOSE UR STRIP-MCNC: NEGATIVE MG/DL
HGB UR QL STRIP: NEGATIVE
KETONES UR STRIP-MCNC: NEGATIVE MG/DL
LACTATE SERPL-SCNC: 1.2 MMOL/L (ref 0.5–2.2)
LEUKOCYTE ESTERASE UR QL STRIP: NEGATIVE
NITRATE UR QL: NEGATIVE
PH UR STRIP: 6.5 [PH] (ref 5–8)
PHOSPHATE SERPL-MCNC: 3.6 MG/DL (ref 2.5–4.5)
POTASSIUM BLD-SCNC: 4.1 MMOL/L (ref 3.5–5)
SODIUM SERPL-SCNC: 140 MMOL/L (ref 136–145)
SP GR UR STRIP: 1.01 (ref 1–1.03)
UROBILINOGEN UR STRIP-ACNC: NORMAL

## 2019-01-16 ASSESSMENT — MIFFLIN-ST. JEOR: SCORE: 1357.6

## 2019-02-22 ENCOUNTER — AMBULATORY - HEALTHEAST (OUTPATIENT)
Dept: NURSING | Facility: CLINIC | Age: 64
End: 2019-02-22

## 2019-02-26 ENCOUNTER — COMMUNICATION - HEALTHEAST (OUTPATIENT)
Dept: INTERNAL MEDICINE | Facility: CLINIC | Age: 64
End: 2019-02-26

## 2019-03-09 ENCOUNTER — COMMUNICATION - HEALTHEAST (OUTPATIENT)
Dept: INTERNAL MEDICINE | Facility: CLINIC | Age: 64
End: 2019-03-09

## 2019-03-09 DIAGNOSIS — Z78.0 POSTMENOPAUSAL: ICD-10-CM

## 2019-03-26 ENCOUNTER — AMBULATORY - HEALTHEAST (OUTPATIENT)
Dept: NURSING | Facility: CLINIC | Age: 64
End: 2019-03-26

## 2019-03-26 ENCOUNTER — COMMUNICATION - HEALTHEAST (OUTPATIENT)
Dept: INTERNAL MEDICINE | Facility: CLINIC | Age: 64
End: 2019-03-26

## 2019-03-26 DIAGNOSIS — Z78.0 POSTMENOPAUSAL: ICD-10-CM

## 2019-04-21 ENCOUNTER — COMMUNICATION - HEALTHEAST (OUTPATIENT)
Dept: INTERNAL MEDICINE | Facility: CLINIC | Age: 64
End: 2019-04-21

## 2019-04-21 DIAGNOSIS — R23.2 HOT FLASHES: ICD-10-CM

## 2019-04-21 DIAGNOSIS — Z78.0 POSTMENOPAUSAL: ICD-10-CM

## 2019-04-22 ENCOUNTER — COMMUNICATION - HEALTHEAST (OUTPATIENT)
Dept: INTERNAL MEDICINE | Facility: CLINIC | Age: 64
End: 2019-04-22

## 2019-04-22 DIAGNOSIS — R23.2 HOT FLASHES: ICD-10-CM

## 2019-04-22 DIAGNOSIS — Z78.0 POSTMENOPAUSAL: ICD-10-CM

## 2019-04-22 DIAGNOSIS — E03.9 HYPOTHYROIDISM: ICD-10-CM

## 2019-04-23 ENCOUNTER — RECORDS - HEALTHEAST (OUTPATIENT)
Dept: ADMINISTRATIVE | Facility: OTHER | Age: 64
End: 2019-04-23

## 2019-04-23 ENCOUNTER — AMBULATORY - HEALTHEAST (OUTPATIENT)
Dept: NURSING | Facility: CLINIC | Age: 64
End: 2019-04-23

## 2019-04-25 ENCOUNTER — OFFICE VISIT - HEALTHEAST (OUTPATIENT)
Dept: INTERNAL MEDICINE | Facility: CLINIC | Age: 64
End: 2019-04-25

## 2019-04-25 DIAGNOSIS — Z83.1 FAMILY HISTORY OF COLD SORES: ICD-10-CM

## 2019-04-25 DIAGNOSIS — J02.0 STREPTOCOCCAL SORE THROAT: ICD-10-CM

## 2019-04-25 LAB — DEPRECATED S PYO AG THROAT QL EIA: NORMAL

## 2019-04-25 ASSESSMENT — MIFFLIN-ST. JEOR: SCORE: 1366.31

## 2019-04-26 ENCOUNTER — RECORDS - HEALTHEAST (OUTPATIENT)
Dept: ADMINISTRATIVE | Facility: OTHER | Age: 64
End: 2019-04-26

## 2019-04-26 LAB — GROUP A STREP BY PCR: NORMAL

## 2019-04-27 ENCOUNTER — COMMUNICATION - HEALTHEAST (OUTPATIENT)
Dept: SCHEDULING | Facility: CLINIC | Age: 64
End: 2019-04-27

## 2019-05-15 ENCOUNTER — COMMUNICATION - HEALTHEAST (OUTPATIENT)
Dept: INTERNAL MEDICINE | Facility: CLINIC | Age: 64
End: 2019-05-15

## 2019-05-15 ENCOUNTER — AMBULATORY - HEALTHEAST (OUTPATIENT)
Dept: NURSING | Facility: CLINIC | Age: 64
End: 2019-05-15

## 2019-05-23 ENCOUNTER — COMMUNICATION - HEALTHEAST (OUTPATIENT)
Dept: INTERNAL MEDICINE | Facility: CLINIC | Age: 64
End: 2019-05-23

## 2019-05-23 DIAGNOSIS — M65.949 TENOSYNOVITIS OF FINGER: ICD-10-CM

## 2019-05-24 ENCOUNTER — COMMUNICATION - HEALTHEAST (OUTPATIENT)
Dept: SCHEDULING | Facility: CLINIC | Age: 64
End: 2019-05-24

## 2019-05-24 ENCOUNTER — AMBULATORY - HEALTHEAST (OUTPATIENT)
Dept: INTERNAL MEDICINE | Facility: CLINIC | Age: 64
End: 2019-05-24

## 2019-05-24 DIAGNOSIS — J01.00 ACUTE MAXILLARY SINUSITIS, RECURRENCE NOT SPECIFIED: ICD-10-CM

## 2019-06-21 ENCOUNTER — AMBULATORY - HEALTHEAST (OUTPATIENT)
Dept: NURSING | Facility: CLINIC | Age: 64
End: 2019-06-21

## 2019-07-25 ENCOUNTER — OFFICE VISIT - HEALTHEAST (OUTPATIENT)
Dept: INTERNAL MEDICINE | Facility: CLINIC | Age: 64
End: 2019-07-25

## 2019-07-25 DIAGNOSIS — Z00.00 ROUTINE GENERAL MEDICAL EXAMINATION AT A HEALTH CARE FACILITY: ICD-10-CM

## 2019-07-25 DIAGNOSIS — F41.8 SITUATIONAL ANXIETY: ICD-10-CM

## 2019-07-25 DIAGNOSIS — E03.9 HYPOTHYROIDISM, UNSPECIFIED TYPE: ICD-10-CM

## 2019-07-25 DIAGNOSIS — R00.2 HEART PALPITATIONS: ICD-10-CM

## 2019-07-25 DIAGNOSIS — K21.00 GASTROESOPHAGEAL REFLUX DISEASE WITH ESOPHAGITIS: ICD-10-CM

## 2019-07-25 DIAGNOSIS — E78.5 HYPERLIPIDEMIA, UNSPECIFIED HYPERLIPIDEMIA TYPE: ICD-10-CM

## 2019-07-25 LAB
ALBUMIN SERPL-MCNC: 4.3 G/DL (ref 3.5–5)
ALBUMIN UR-MCNC: NEGATIVE MG/DL
ALP SERPL-CCNC: 70 U/L (ref 45–120)
ALT SERPL W P-5'-P-CCNC: 26 U/L (ref 0–45)
ANION GAP SERPL CALCULATED.3IONS-SCNC: 8 MMOL/L (ref 5–18)
APPEARANCE UR: CLEAR
AST SERPL W P-5'-P-CCNC: 24 U/L (ref 0–40)
ATRIAL RATE - MUSE: 81 BPM
BASOPHILS # BLD AUTO: 0.1 THOU/UL (ref 0–0.2)
BASOPHILS NFR BLD AUTO: 1 % (ref 0–2)
BILIRUB DIRECT SERPL-MCNC: 0.2 MG/DL
BILIRUB SERPL-MCNC: 0.4 MG/DL (ref 0–1)
BILIRUB UR QL STRIP: NEGATIVE
BUN SERPL-MCNC: 13 MG/DL (ref 8–22)
CALCIUM SERPL-MCNC: 10.2 MG/DL (ref 8.5–10.5)
CHLORIDE BLD-SCNC: 105 MMOL/L (ref 98–107)
CHOLEST SERPL-MCNC: 184 MG/DL
CO2 SERPL-SCNC: 27 MMOL/L (ref 22–31)
COLOR UR AUTO: YELLOW
CREAT SERPL-MCNC: 0.69 MG/DL (ref 0.6–1.1)
DIASTOLIC BLOOD PRESSURE - MUSE: NORMAL MMHG
EOSINOPHIL # BLD AUTO: 0.1 THOU/UL (ref 0–0.4)
EOSINOPHIL NFR BLD AUTO: 1 % (ref 0–6)
ERYTHROCYTE [DISTWIDTH] IN BLOOD BY AUTOMATED COUNT: 12.8 % (ref 11–14.5)
ERYTHROCYTE [SEDIMENTATION RATE] IN BLOOD BY WESTERGREN METHOD: 6 MM/HR (ref 0–20)
FASTING STATUS PATIENT QL REPORTED: YES
GFR SERPL CREATININE-BSD FRML MDRD: >60 ML/MIN/1.73M2
GLUCOSE BLD-MCNC: 107 MG/DL (ref 70–125)
GLUCOSE UR STRIP-MCNC: NEGATIVE MG/DL
HCT VFR BLD AUTO: 43.2 % (ref 35–47)
HDLC SERPL-MCNC: 59 MG/DL
HGB BLD-MCNC: 14.4 G/DL (ref 12–16)
HGB UR QL STRIP: NEGATIVE
INTERPRETATION ECG - MUSE: NORMAL
KETONES UR STRIP-MCNC: NEGATIVE MG/DL
LDLC SERPL CALC-MCNC: 101 MG/DL
LEUKOCYTE ESTERASE UR QL STRIP: NEGATIVE
LYMPHOCYTES # BLD AUTO: 1.8 THOU/UL (ref 0.8–4.4)
LYMPHOCYTES NFR BLD AUTO: 27 % (ref 20–40)
MCH RBC QN AUTO: 29.6 PG (ref 27–34)
MCHC RBC AUTO-ENTMCNC: 33.3 G/DL (ref 32–36)
MCV RBC AUTO: 89 FL (ref 80–100)
MONOCYTES # BLD AUTO: 0.3 THOU/UL (ref 0–0.9)
MONOCYTES NFR BLD AUTO: 5 % (ref 2–10)
NEUTROPHILS # BLD AUTO: 4.3 THOU/UL (ref 2–7.7)
NEUTROPHILS NFR BLD AUTO: 66 % (ref 50–70)
NITRATE UR QL: NEGATIVE
P AXIS - MUSE: 77 DEGREES
PH UR STRIP: 5.5 [PH] (ref 5–8)
PLATELET # BLD AUTO: 236 THOU/UL (ref 140–440)
PMV BLD AUTO: 8.1 FL (ref 7–10)
POTASSIUM BLD-SCNC: 4.4 MMOL/L (ref 3.5–5)
PR INTERVAL - MUSE: 174 MS
PROT SERPL-MCNC: 7.6 G/DL (ref 6–8)
QRS DURATION - MUSE: 142 MS
QT - MUSE: 432 MS
QTC - MUSE: 501 MS
R AXIS - MUSE: -32 DEGREES
RBC # BLD AUTO: 4.86 MILL/UL (ref 3.8–5.4)
SODIUM SERPL-SCNC: 140 MMOL/L (ref 136–145)
SP GR UR STRIP: 1.02 (ref 1–1.03)
SYSTOLIC BLOOD PRESSURE - MUSE: NORMAL MMHG
T AXIS - MUSE: 99 DEGREES
TRIGL SERPL-MCNC: 122 MG/DL
TSH SERPL DL<=0.005 MIU/L-ACNC: 0.34 UIU/ML (ref 0.3–5)
UROBILINOGEN UR STRIP-ACNC: NORMAL
VENTRICULAR RATE- MUSE: 81 BPM
WBC: 6.4 THOU/UL (ref 4–11)

## 2019-07-25 ASSESSMENT — MIFFLIN-ST. JEOR: SCORE: 1357.79

## 2019-07-26 ENCOUNTER — COMMUNICATION - HEALTHEAST (OUTPATIENT)
Dept: INTERNAL MEDICINE | Facility: CLINIC | Age: 64
End: 2019-07-26

## 2019-07-31 ENCOUNTER — HOSPITAL ENCOUNTER (OUTPATIENT)
Dept: MAMMOGRAPHY | Facility: CLINIC | Age: 64
Discharge: HOME | End: 2019-07-31
Attending: INTERNAL MEDICINE | Admitting: INTERNAL MEDICINE
Payer: COMMERCIAL

## 2019-07-31 ENCOUNTER — AMBULATORY - HEALTHEAST (OUTPATIENT)
Dept: MULTI SPECIALTY CLINIC | Facility: CLINIC | Age: 64
End: 2019-07-31

## 2019-07-31 DIAGNOSIS — Z12.31 VISIT FOR SCREENING MAMMOGRAM: ICD-10-CM

## 2019-07-31 PROCEDURE — 77063 BREAST TOMOSYNTHESIS BI: CPT

## 2019-08-21 ENCOUNTER — RECORDS - HEALTHEAST (OUTPATIENT)
Dept: ADMINISTRATIVE | Facility: OTHER | Age: 64
End: 2019-08-21

## 2019-08-26 ENCOUNTER — AMBULATORY - HEALTHEAST (OUTPATIENT)
Dept: NURSING | Facility: CLINIC | Age: 64
End: 2019-08-26

## 2019-09-24 ENCOUNTER — AMBULATORY - HEALTHEAST (OUTPATIENT)
Dept: NURSING | Facility: CLINIC | Age: 64
End: 2019-09-24

## 2019-09-26 ENCOUNTER — COMMUNICATION - HEALTHEAST (OUTPATIENT)
Dept: INTERNAL MEDICINE | Facility: CLINIC | Age: 64
End: 2019-09-26

## 2019-09-26 DIAGNOSIS — E78.5 HYPERLIPIDEMIA: ICD-10-CM

## 2019-09-26 DIAGNOSIS — Z78.0 POSTMENOPAUSAL: ICD-10-CM

## 2019-10-29 ENCOUNTER — COMMUNICATION - HEALTHEAST (OUTPATIENT)
Dept: INTERNAL MEDICINE | Facility: CLINIC | Age: 64
End: 2019-10-29

## 2019-10-29 DIAGNOSIS — K21.9 GERD (GASTROESOPHAGEAL REFLUX DISEASE): ICD-10-CM

## 2019-10-31 ENCOUNTER — COMMUNICATION - HEALTHEAST (OUTPATIENT)
Dept: INTERNAL MEDICINE | Facility: CLINIC | Age: 64
End: 2019-10-31

## 2019-10-31 ENCOUNTER — OFFICE VISIT - HEALTHEAST (OUTPATIENT)
Dept: INTERNAL MEDICINE | Facility: CLINIC | Age: 64
End: 2019-10-31

## 2019-10-31 DIAGNOSIS — R00.2 HEART PALPITATIONS: ICD-10-CM

## 2019-10-31 DIAGNOSIS — E03.9 HYPOTHYROIDISM, UNSPECIFIED TYPE: ICD-10-CM

## 2019-10-31 DIAGNOSIS — Z12.11 SCREEN FOR COLON CANCER: ICD-10-CM

## 2019-10-31 DIAGNOSIS — R79.89 LOW VITAMIN B12 LEVEL: ICD-10-CM

## 2019-10-31 DIAGNOSIS — F41.8 SITUATIONAL ANXIETY: ICD-10-CM

## 2019-10-31 ASSESSMENT — MIFFLIN-ST. JEOR: SCORE: 1371.39

## 2019-11-20 ENCOUNTER — RECORDS - HEALTHEAST (OUTPATIENT)
Dept: ADMINISTRATIVE | Facility: OTHER | Age: 64
End: 2019-11-20

## 2019-11-22 ENCOUNTER — AMBULATORY - HEALTHEAST (OUTPATIENT)
Dept: NURSING | Facility: CLINIC | Age: 64
End: 2019-11-22

## 2019-11-22 ENCOUNTER — COMMUNICATION - HEALTHEAST (OUTPATIENT)
Dept: INTERNAL MEDICINE | Facility: CLINIC | Age: 64
End: 2019-11-22

## 2019-12-06 ENCOUNTER — COMMUNICATION - HEALTHEAST (OUTPATIENT)
Dept: INTERNAL MEDICINE | Facility: CLINIC | Age: 64
End: 2019-12-06

## 2019-12-30 ENCOUNTER — AMBULATORY - HEALTHEAST (OUTPATIENT)
Dept: NURSING | Facility: CLINIC | Age: 64
End: 2019-12-30

## 2020-01-13 ENCOUNTER — COMMUNICATION - HEALTHEAST (OUTPATIENT)
Dept: INTERNAL MEDICINE | Facility: CLINIC | Age: 65
End: 2020-01-13

## 2020-02-11 ENCOUNTER — AMBULATORY - HEALTHEAST (OUTPATIENT)
Dept: NURSING | Facility: CLINIC | Age: 65
End: 2020-02-11

## 2020-02-19 ENCOUNTER — COMMUNICATION - HEALTHEAST (OUTPATIENT)
Dept: INTERNAL MEDICINE | Facility: CLINIC | Age: 65
End: 2020-02-19

## 2020-02-20 ENCOUNTER — AMBULATORY - HEALTHEAST (OUTPATIENT)
Dept: INTERNAL MEDICINE | Facility: CLINIC | Age: 65
End: 2020-02-20

## 2020-02-20 DIAGNOSIS — E03.9 HYPOTHYROIDISM, UNSPECIFIED TYPE: ICD-10-CM

## 2020-02-20 DIAGNOSIS — R53.83 FATIGUE, UNSPECIFIED TYPE: ICD-10-CM

## 2020-02-24 ENCOUNTER — AMBULATORY - HEALTHEAST (OUTPATIENT)
Dept: LAB | Facility: CLINIC | Age: 65
End: 2020-02-24

## 2020-02-24 DIAGNOSIS — E03.9 HYPOTHYROIDISM, UNSPECIFIED TYPE: ICD-10-CM

## 2020-02-24 DIAGNOSIS — R53.83 FATIGUE, UNSPECIFIED TYPE: ICD-10-CM

## 2020-02-24 LAB
BASOPHILS # BLD AUTO: 0.1 THOU/UL (ref 0–0.2)
BASOPHILS NFR BLD AUTO: 1 % (ref 0–2)
EOSINOPHIL # BLD AUTO: 0.2 THOU/UL (ref 0–0.4)
EOSINOPHIL NFR BLD AUTO: 3 % (ref 0–6)
ERYTHROCYTE [DISTWIDTH] IN BLOOD BY AUTOMATED COUNT: 12.4 % (ref 11–14.5)
HCT VFR BLD AUTO: 39.1 % (ref 35–47)
HGB BLD-MCNC: 13.2 G/DL (ref 12–16)
LYMPHOCYTES # BLD AUTO: 2.4 THOU/UL (ref 0.8–4.4)
LYMPHOCYTES NFR BLD AUTO: 29 % (ref 20–40)
MCH RBC QN AUTO: 30.4 PG (ref 27–34)
MCHC RBC AUTO-ENTMCNC: 33.8 G/DL (ref 32–36)
MCV RBC AUTO: 90 FL (ref 80–100)
MONOCYTES # BLD AUTO: 0.4 THOU/UL (ref 0–0.9)
MONOCYTES NFR BLD AUTO: 5 % (ref 2–10)
NEUTROPHILS # BLD AUTO: 5.1 THOU/UL (ref 2–7.7)
NEUTROPHILS NFR BLD AUTO: 62 % (ref 50–70)
PLATELET # BLD AUTO: 232 THOU/UL (ref 140–440)
PMV BLD AUTO: 7.3 FL (ref 7–10)
RBC # BLD AUTO: 4.35 MILL/UL (ref 3.8–5.4)
T3FREE SERPL-MCNC: 3 PG/ML (ref 1.9–3.9)
TSH SERPL DL<=0.005 MIU/L-ACNC: 1.29 UIU/ML (ref 0.3–5)
WBC: 8.2 THOU/UL (ref 4–11)

## 2020-02-26 ENCOUNTER — COMMUNICATION - HEALTHEAST (OUTPATIENT)
Dept: INTERNAL MEDICINE | Facility: CLINIC | Age: 65
End: 2020-02-26

## 2020-02-27 ENCOUNTER — OFFICE VISIT - HEALTHEAST (OUTPATIENT)
Dept: INTERNAL MEDICINE | Facility: CLINIC | Age: 65
End: 2020-02-27

## 2020-02-27 DIAGNOSIS — K59.01 SLOW TRANSIT CONSTIPATION: ICD-10-CM

## 2020-02-27 DIAGNOSIS — E03.9 HYPOTHYROIDISM, UNSPECIFIED TYPE: ICD-10-CM

## 2020-02-27 DIAGNOSIS — R53.83 FATIGUE, UNSPECIFIED TYPE: ICD-10-CM

## 2020-02-27 ASSESSMENT — MIFFLIN-ST. JEOR: SCORE: 1393.53

## 2020-02-28 ENCOUNTER — COMMUNICATION - HEALTHEAST (OUTPATIENT)
Dept: INTERNAL MEDICINE | Facility: CLINIC | Age: 65
End: 2020-02-28

## 2020-03-20 ENCOUNTER — COMMUNICATION - HEALTHEAST (OUTPATIENT)
Dept: INTERNAL MEDICINE | Facility: CLINIC | Age: 65
End: 2020-03-20

## 2020-03-20 DIAGNOSIS — Z78.0 POSTMENOPAUSAL: ICD-10-CM

## 2020-03-20 DIAGNOSIS — R23.2 HOT FLASHES: ICD-10-CM

## 2020-03-20 DIAGNOSIS — E03.9 HYPOTHYROIDISM: ICD-10-CM

## 2020-03-25 ENCOUNTER — COMMUNICATION - HEALTHEAST (OUTPATIENT)
Dept: INTERNAL MEDICINE | Facility: CLINIC | Age: 65
End: 2020-03-25

## 2020-04-06 ENCOUNTER — COMMUNICATION - HEALTHEAST (OUTPATIENT)
Dept: INTERNAL MEDICINE | Facility: CLINIC | Age: 65
End: 2020-04-06

## 2020-04-06 DIAGNOSIS — E03.9 HYPOTHYROIDISM: ICD-10-CM

## 2020-04-10 ENCOUNTER — OFFICE VISIT - HEALTHEAST (OUTPATIENT)
Dept: INTERNAL MEDICINE | Facility: CLINIC | Age: 65
End: 2020-04-10

## 2020-04-10 ENCOUNTER — COMMUNICATION - HEALTHEAST (OUTPATIENT)
Dept: INTERNAL MEDICINE | Facility: CLINIC | Age: 65
End: 2020-04-10

## 2020-04-10 DIAGNOSIS — Z83.1 FAMILY HISTORY OF COLD SORES: ICD-10-CM

## 2020-04-10 DIAGNOSIS — K21.9 GERD (GASTROESOPHAGEAL REFLUX DISEASE): ICD-10-CM

## 2020-04-10 DIAGNOSIS — Z78.0 POSTMENOPAUSAL: ICD-10-CM

## 2020-04-10 DIAGNOSIS — E03.9 HYPOTHYROIDISM, UNSPECIFIED TYPE: ICD-10-CM

## 2020-04-10 DIAGNOSIS — R63.5 WEIGHT GAIN: ICD-10-CM

## 2020-04-10 DIAGNOSIS — F41.8 SITUATIONAL ANXIETY: ICD-10-CM

## 2020-04-10 DIAGNOSIS — E78.5 HYPERLIPIDEMIA: ICD-10-CM

## 2020-05-12 ENCOUNTER — COMMUNICATION - HEALTHEAST (OUTPATIENT)
Dept: INTERNAL MEDICINE | Facility: CLINIC | Age: 65
End: 2020-05-12

## 2020-05-14 ENCOUNTER — AMBULATORY - HEALTHEAST (OUTPATIENT)
Dept: NURSING | Facility: CLINIC | Age: 65
End: 2020-05-14

## 2020-05-27 ENCOUNTER — COMMUNICATION - HEALTHEAST (OUTPATIENT)
Dept: INTERNAL MEDICINE | Facility: CLINIC | Age: 65
End: 2020-05-27

## 2020-06-04 ENCOUNTER — RECORDS - HEALTHEAST (OUTPATIENT)
Dept: ADMINISTRATIVE | Facility: OTHER | Age: 65
End: 2020-06-04

## 2020-06-15 ENCOUNTER — AMBULATORY - HEALTHEAST (OUTPATIENT)
Dept: NURSING | Facility: CLINIC | Age: 65
End: 2020-06-15

## 2020-06-16 ENCOUNTER — COMMUNICATION - HEALTHEAST (OUTPATIENT)
Dept: INTERNAL MEDICINE | Facility: CLINIC | Age: 65
End: 2020-06-16

## 2020-06-18 ENCOUNTER — COMMUNICATION - HEALTHEAST (OUTPATIENT)
Dept: INTERNAL MEDICINE | Facility: CLINIC | Age: 65
End: 2020-06-18

## 2020-06-18 DIAGNOSIS — R23.2 HOT FLASHES: ICD-10-CM

## 2020-06-18 DIAGNOSIS — Z78.0 POSTMENOPAUSAL: ICD-10-CM

## 2020-06-29 ENCOUNTER — COMMUNICATION - HEALTHEAST (OUTPATIENT)
Dept: INTERNAL MEDICINE | Facility: CLINIC | Age: 65
End: 2020-06-29

## 2020-07-08 ENCOUNTER — OFFICE VISIT - HEALTHEAST (OUTPATIENT)
Dept: INTERNAL MEDICINE | Facility: CLINIC | Age: 65
End: 2020-07-08

## 2020-07-08 ENCOUNTER — COMMUNICATION - HEALTHEAST (OUTPATIENT)
Dept: INTERNAL MEDICINE | Facility: CLINIC | Age: 65
End: 2020-07-08

## 2020-07-08 DIAGNOSIS — I10 ESSENTIAL HYPERTENSION: ICD-10-CM

## 2020-07-08 DIAGNOSIS — E03.9 HYPOTHYROIDISM, UNSPECIFIED TYPE: ICD-10-CM

## 2020-07-08 DIAGNOSIS — R79.89 LOW VITAMIN B12 LEVEL: ICD-10-CM

## 2020-07-08 DIAGNOSIS — M79.10 MYALGIA: ICD-10-CM

## 2020-07-08 DIAGNOSIS — L60.8 CHANGE IN NAIL APPEARANCE: ICD-10-CM

## 2020-07-08 DIAGNOSIS — R73.9 HYPERGLYCEMIA: ICD-10-CM

## 2020-07-08 DIAGNOSIS — G62.89 OTHER POLYNEUROPATHY: ICD-10-CM

## 2020-07-08 LAB
ALBUMIN SERPL-MCNC: 4.2 G/DL (ref 3.5–5)
ALP SERPL-CCNC: 74 U/L (ref 45–120)
ALT SERPL W P-5'-P-CCNC: 26 U/L (ref 0–45)
ALT SERPL-CCNC: 26 U/L (ref 0–45)
ANION GAP SERPL CALCULATED.3IONS-SCNC: 12 MMOL/L (ref 5–18)
AST SERPL W P-5'-P-CCNC: 30 U/L (ref 0–40)
AST SERPL-CCNC: 30 U/L (ref 0–40)
BILIRUB SERPL-MCNC: 0.4 MG/DL (ref 0–1)
BUN SERPL-MCNC: 12 MG/DL (ref 8–22)
CALCIUM SERPL-MCNC: 10 MG/DL (ref 8.5–10.5)
CHLORIDE BLD-SCNC: 104 MMOL/L (ref 98–107)
CK SERPL-CCNC: 89 U/L (ref 30–190)
CO2 SERPL-SCNC: 24 MMOL/L (ref 22–31)
CREAT SERPL-MCNC: 0.68 MG/DL (ref 0.6–1.1)
CREAT SERPL-MCNC: 0.68 MG/DL (ref 0.6–1.1)
ERYTHROCYTE [DISTWIDTH] IN BLOOD BY AUTOMATED COUNT: 11.7 % (ref 11–14.5)
GFR SERPL CREATININE-BSD FRML MDRD: >60 ML/MIN/1.73M2
GFR SERPL CREATININE-BSD FRML MDRD: >60 ML/MIN/1.73M2
GLUCOSE BLD-MCNC: 109 MG/DL (ref 70–125)
GLUCOSE SERPL-MCNC: 109 MG/DL (ref 70–125)
HBA1C MFR BLD: 5.9 % (ref 3.5–6)
HBA1C MFR BLD: 5.9 % (ref 3.5–6)
HCT VFR BLD AUTO: 43.1 % (ref 35–47)
HGB BLD-MCNC: 14.8 G/DL (ref 12–16)
MCH RBC QN AUTO: 30.6 PG (ref 27–34)
MCHC RBC AUTO-ENTMCNC: 34.4 G/DL (ref 32–36)
MCV RBC AUTO: 89 FL (ref 80–100)
PLATELET # BLD AUTO: 228 THOU/UL (ref 140–440)
PMV BLD AUTO: 7.8 FL (ref 7–10)
POTASSIUM BLD-SCNC: 4.5 MMOL/L (ref 3.5–5)
POTASSIUM SERPL-SCNC: 4.5 MMOL/L (ref 3.5–5)
PROT SERPL-MCNC: 7.5 G/DL (ref 6–8)
RBC # BLD AUTO: 4.85 MILL/UL (ref 3.8–5.4)
SODIUM SERPL-SCNC: 140 MMOL/L (ref 136–145)
T4 FREE SERPL-MCNC: 1.2 NG/DL (ref 0.7–1.8)
TSH SERPL DL<=0.005 MIU/L-ACNC: 0.1 UIU/ML (ref 0.3–5)
TSH SERPL-ACNC: 0.1 UIU/ML (ref 0.3–5)
VIT B12 SERPL-MCNC: 502 PG/ML (ref 213–816)
WBC: 6.7 THOU/UL (ref 4–11)

## 2020-07-08 ASSESSMENT — MIFFLIN-ST. JEOR: SCORE: 1379.92

## 2020-07-09 ENCOUNTER — TRANSFERRED RECORDS (OUTPATIENT)
Dept: HEALTH INFORMATION MANAGEMENT | Facility: CLINIC | Age: 65
End: 2020-07-09

## 2020-07-09 ENCOUNTER — COMMUNICATION - HEALTHEAST (OUTPATIENT)
Dept: INTERNAL MEDICINE | Facility: CLINIC | Age: 65
End: 2020-07-09

## 2020-07-09 ENCOUNTER — TELEPHONE (OUTPATIENT)
Dept: FAMILY MEDICINE | Facility: CLINIC | Age: 65
End: 2020-07-09

## 2020-07-09 NOTE — TELEPHONE ENCOUNTER
Reason for Call:  Other appointment    Detailed comments: Pt asking to see Dr Ballesteros for Annual Physical and care.  Explained that Dr Ballesteros has a closed practice, pt asking if Dr Ballesteros would consider seeing her as her gyn dr, Dr Santizo from [facility] referred her to Dr Ballesteros.    Phone Number Patient can be reached at: Cell number on file:    Telephone Information:   Mobile [phone]       Best Time: any    Can we leave a detailed message on this number? Not Applicable     Please advise.  Thank you.  [facility]    Call taken on 7/9/2020 at 9:19 AM by Holly Araiza

## 2020-07-16 ENCOUNTER — AMBULATORY - HEALTHEAST (OUTPATIENT)
Dept: NURSING | Facility: CLINIC | Age: 65
End: 2020-07-16

## 2020-08-17 ENCOUNTER — OFFICE VISIT (OUTPATIENT)
Dept: FAMILY MEDICINE | Facility: CLINIC | Age: 65
End: 2020-08-17
Payer: COMMERCIAL

## 2020-08-17 VITALS
WEIGHT: 179.4 LBS | RESPIRATION RATE: 16 BRPM | HEIGHT: 67 IN | HEART RATE: 75 BPM | BODY MASS INDEX: 28.16 KG/M2 | DIASTOLIC BLOOD PRESSURE: 72 MMHG | SYSTOLIC BLOOD PRESSURE: 124 MMHG | TEMPERATURE: 97.8 F | OXYGEN SATURATION: 96 %

## 2020-08-17 DIAGNOSIS — R73.03 PREDIABETES: ICD-10-CM

## 2020-08-17 DIAGNOSIS — K22.70 BARRETT'S ESOPHAGUS WITHOUT DYSPLASIA: ICD-10-CM

## 2020-08-17 DIAGNOSIS — Z12.31 VISIT FOR SCREENING MAMMOGRAM: ICD-10-CM

## 2020-08-17 DIAGNOSIS — Z79.899 CURRENT USE OF PROTON PUMP INHIBITOR: ICD-10-CM

## 2020-08-17 DIAGNOSIS — K44.9 HIATAL HERNIA: ICD-10-CM

## 2020-08-17 DIAGNOSIS — E55.9 VITAMIN D DEFICIENCY: ICD-10-CM

## 2020-08-17 DIAGNOSIS — E53.8 VITAMIN B12 DEFICIENCY (NON ANEMIC): ICD-10-CM

## 2020-08-17 DIAGNOSIS — E78.5 HYPERLIPIDEMIA LDL GOAL <100: ICD-10-CM

## 2020-08-17 DIAGNOSIS — E06.3 HASHIMOTO'S THYROIDITIS: ICD-10-CM

## 2020-08-17 DIAGNOSIS — Z78.0 MENOPAUSE: ICD-10-CM

## 2020-08-17 DIAGNOSIS — Z00.00 ROUTINE GENERAL MEDICAL EXAMINATION AT A HEALTH CARE FACILITY: Primary | ICD-10-CM

## 2020-08-17 DIAGNOSIS — Z23 ENCOUNTER FOR IMMUNIZATION: ICD-10-CM

## 2020-08-17 PROCEDURE — 90471 IMMUNIZATION ADMIN: CPT | Performed by: INTERNAL MEDICINE

## 2020-08-17 PROCEDURE — 90715 TDAP VACCINE 7 YRS/> IM: CPT | Performed by: INTERNAL MEDICINE

## 2020-08-17 PROCEDURE — 99214 OFFICE O/P EST MOD 30 MIN: CPT | Mod: 25 | Performed by: INTERNAL MEDICINE

## 2020-08-17 PROCEDURE — 96372 THER/PROPH/DIAG INJ SC/IM: CPT | Performed by: INTERNAL MEDICINE

## 2020-08-17 PROCEDURE — 99386 PREV VISIT NEW AGE 40-64: CPT | Mod: 25 | Performed by: INTERNAL MEDICINE

## 2020-08-17 RX ORDER — BLOOD-GLUCOSE METER
EACH MISCELLANEOUS DAILY PRN
COMMUNITY
Start: 2020-07-08 | End: 2022-08-01

## 2020-08-17 RX ORDER — VITC/E/ZINC/COPPER/LUTEIN/ZEAX 250 MG-200
1 TABLET,CHEWABLE ORAL 2 TIMES DAILY
COMMUNITY

## 2020-08-17 RX ORDER — CYANOCOBALAMIN 1000 UG/ML
1000 INJECTION, SOLUTION INTRAMUSCULAR; SUBCUTANEOUS
COMMUNITY
Start: 2016-05-13 | End: 2022-08-01

## 2020-08-17 RX ORDER — PROPRANOLOL HYDROCHLORIDE 10 MG/1
1 TABLET ORAL PRN
COMMUNITY
Start: 2019-08-14 | End: 2021-01-07

## 2020-08-17 RX ORDER — VALACYCLOVIR HYDROCHLORIDE 500 MG/1
1 TABLET, FILM COATED ORAL DAILY PRN
COMMUNITY
Start: 2020-06-04 | End: 2022-12-22

## 2020-08-17 RX ORDER — ROSUVASTATIN CALCIUM 5 MG/1
1 TABLET, COATED ORAL
COMMUNITY
Start: 2020-06-30 | End: 2021-07-13

## 2020-08-17 RX ORDER — VALACYCLOVIR HYDROCHLORIDE 1 G/1
1000 TABLET, FILM COATED ORAL PRN
COMMUNITY
Start: 2020-04-10 | End: 2020-08-17 | Stop reason: DRUGHIGH

## 2020-08-17 RX ORDER — LEVOTHYROXINE SODIUM 125 MCG
125 TABLET ORAL DAILY
Qty: 90 TABLET | Refills: 3 | Status: SHIPPED | OUTPATIENT
Start: 2020-08-17 | End: 2021-07-05

## 2020-08-17 RX ORDER — BLOOD SUGAR DIAGNOSTIC
STRIP MISCELLANEOUS DAILY PRN
COMMUNITY
Start: 2020-07-08 | End: 2020-09-18

## 2020-08-17 RX ORDER — ALPRAZOLAM 0.25 MG
0.25 TABLET ORAL
COMMUNITY
Start: 2020-04-10 | End: 2022-08-01

## 2020-08-17 RX ORDER — LEVOTHYROXINE SODIUM 125 UG/1
125 TABLET ORAL DAILY
COMMUNITY
Start: 2020-04-10 | End: 2020-08-17

## 2020-08-17 RX ORDER — ERGOCALCIFEROL 1.25 MG/1
1 CAPSULE, LIQUID FILLED ORAL WEEKLY
COMMUNITY
Start: 2020-04-10 | End: 2021-04-15 | Stop reason: ALTCHOICE

## 2020-08-17 RX ORDER — LANCETS
EACH MISCELLANEOUS DAILY PRN
COMMUNITY
Start: 2020-07-08 | End: 2022-08-01

## 2020-08-17 RX ORDER — LANSOPRAZOLE 30 MG/1
1 CAPSULE, DELAYED RELEASE ORAL DAILY
COMMUNITY
Start: 2020-04-10 | End: 2021-01-07

## 2020-08-17 RX ORDER — CYANOCOBALAMIN 1000 UG/ML
1000 INJECTION, SOLUTION INTRAMUSCULAR; SUBCUTANEOUS
Status: ACTIVE | OUTPATIENT
Start: 2020-08-17

## 2020-08-17 RX ADMIN — CYANOCOBALAMIN 1000 MCG: 1000 INJECTION, SOLUTION INTRAMUSCULAR; SUBCUTANEOUS at 14:30

## 2020-08-17 SDOH — HEALTH STABILITY: MENTAL HEALTH: HOW OFTEN DO YOU HAVE A DRINK CONTAINING ALCOHOL?: MONTHLY OR LESS

## 2020-08-17 SDOH — HEALTH STABILITY: MENTAL HEALTH: HOW OFTEN DO YOU HAVE 6 OR MORE DRINKS ON ONE OCCASION?: NEVER

## 2020-08-17 ASSESSMENT — ENCOUNTER SYMPTOMS
HEMATURIA: 0
CHILLS: 0
CONSTIPATION: 1
DIZZINESS: 0
HEMATOCHEZIA: 0
COUGH: 0
DIARRHEA: 1
ABDOMINAL PAIN: 0

## 2020-08-17 ASSESSMENT — MIFFLIN-ST. JEOR: SCORE: 1388.44

## 2020-08-17 NOTE — PROGRESS NOTES
New patient.   Referred by Dr. Mami Santizo- GYN  Previous PCP Dr. Perez Morgan, [facility], recently retired.      SUBJECTIVE:   CC: Luba Nguyen is an 64 year old woman who presents for preventive health visit.     Discussed additional charges that may incur if addressing non physical related concerns.   Pt agreeable.  ULISSES Rivas LPN      Healthy Habits:     Getting at least 3 servings of Calcium per day:  Yes    Bi-annual eye exam:  Yes    Dental care twice a year:  Yes    Sleep apnea or symptoms of sleep apnea:  None    Diet:  Carbohydrate counting    Frequency of exercise:  2-3 days/week    Duration of exercise:  15-30 minutes    Taking medications regularly:  Yes    Barriers to taking medications:  None    Medication side effects:  No significant flushing, Muscle aches and Other    PHQ-2 Total Score: 0    Additional concerns today:  Yes      Today's PHQ-2 Score:   PHQ-2 ( 1999 Pfizer) 8/17/2020   Q1: Little interest or pleasure in doing things 0   Q2: Feeling down, depressed or hopeless 0   PHQ-2 Score 0   Q1: Little interest or pleasure in doing things Not at all   Q2: Feeling down, depressed or hopeless Not at all   PHQ-2 Score 0       Abuse: Current or Past(Physical, Sexual or Emotional)- No  Do you feel safe in your environment? Yes    Have you ever done Advance Care Planning? (For example, a Health Directive, POLST, or a discussion with a medical provider or your loved ones about your wishes): Yes, patient states has an Advance Care Planning document and will bring a copy to the clinic.    Social History     Tobacco Use     Smoking status: Never Smoker     Smokeless tobacco: Never Used   Substance Use Topics     Alcohol use: Yes     Frequency: Monthly or less     Binge frequency: Never         Alcohol Use 8/17/2020   Prescreen: >3 drinks/day or >7 drinks/week? No       Reviewed orders with patient.  Reviewed health maintenance and updated orders accordingly - Yes  Labs reviewed in EPIC  BP Readings  from Last 3 Encounters:   08/17/20 124/72    Wt Readings from Last 3 Encounters:   08/17/20 81.4 kg (179 lb 6.4 oz)                  There is no problem list on file for this patient.    Past Surgical History:   Procedure Laterality Date     CHOLECYSTECTOMY      lap onel     HYSTERECTOMY  2009       Social History     Tobacco Use     Smoking status: Never Smoker     Smokeless tobacco: Never Used   Substance Use Topics     Alcohol use: Yes     Frequency: Monthly or less     Binge frequency: Never     History reviewed. No pertinent family history.      Current Outpatient Medications   Medication Sig Dispense Refill     ACCU-CHEK GUIDE test strip daily as needed       ALPRAZolam (XANAX) 0.25 MG tablet Take 0.25 mg by mouth nightly as needed Take 1 tablet by mouth at bedtime as needed.       Blood Glucose Monitoring Suppl (ACCU-CHEK GUIDE) w/Device KIT daily as needed       cyanocobalamin (CYANOCOBALAMIN) 1000 MCG/ML injection Inject 1,000 mcg into the muscle every 30 days       estrogen conj (PREMARIN) 0.45 MG tablet Take 1 tablet (0.45 mg) by mouth daily 90 tablet 1     LANsoprazole (PREVACID) 30 MG DR capsule Take 1 capsule by mouth daily       Microlet Lancets MISC daily as needed       Multiple Vitamins-Minerals (MULTIVITAMIN ADULT PO) Take 1 tablet by mouth daily       Multiple Vitamins-Minerals (SYSTANE ICAPS AREDS2) CAPS Take 1 capsule by mouth 2 times daily       Probiotic Product (PROBIOTIC ADVANCED PO) Take 1 tablet by mouth daily       propranolol (INDERAL) 10 MG tablet Take 1 tablet by mouth as needed When hiking and at higher elevations has trouble breathing and this helps.       rosuvastatin (CRESTOR) 5 MG tablet Take 1 tablet by mouth three times a week       SYNTHROID 125 MCG tablet Take 1 tablet (125 mcg) by mouth daily Pt takes brand name 90 tablet 3     valACYclovir (VALTREX) 500 MG tablet Take 1 tablet by mouth daily as needed       vitamin D2 (ERGOCALCIFEROL) 23378 units (1250 mcg) capsule Take 1  "capsule by mouth once a week       Allergies   Allergen Reactions     Atorvastatin Muscle Pain (Myalgia)     Really bad muscle aches/legs     Ezetimibe Muscle Pain (Myalgia)     Hmg-Coa-R Inhibitors Other (See Comments)     GI UPSET     Hydrocodone-Acetaminophen Itching     Morphine Nausea and Vomiting     Other Drug Allergy (See Comments)      Pt states she is allergic to all narcotics      Hydrochlorothiazide W/Triamterene Rash     Lisinopril Rash     Omeprazole Rash     Sulfa Drugs Rash     No lab results found.     Mammogram Screening: Patient over age 50, mutual decision to screen reflected in health maintenance.    Pertinent mammograms are reviewed under the imaging tab.  History of abnormal Pap smear: Status post benign hysterectomy. Health Maintenance and Surgical History updated.     Reviewed and updated as needed this visit by clinical staff  Tobacco  Allergies  Meds  Problems  Med Hx  Surg Hx  Fam Hx  Soc Hx          Reviewed and updated as needed this visit by Provider  Tobacco  Allergies  Meds  Problems  Med Hx  Surg Hx  Fam Hx        History reviewed. No pertinent past medical history.   Past Surgical History:   Procedure Laterality Date     CHOLECYSTECTOMY      lap onel     HYSTERECTOMY  2009       Review of Systems   Constitutional: Negative for chills.   HENT: Negative for congestion.    Respiratory: Negative for cough.    Cardiovascular: Negative for chest pain.   Gastrointestinal: Positive for constipation and diarrhea. Negative for abdominal pain and hematochezia.   Genitourinary: Negative for hematuria.   Neurological: Negative for dizziness.          OBJECTIVE:   /72   Pulse 75   Temp 97.8  F (36.6  C) (Oral)   Resp 16   Ht 1.689 m (5' 6.5\")   Wt 81.4 kg (179 lb 6.4 oz)   SpO2 96%   BMI 28.52 kg/m    Physical Exam  GENERAL: healthy, alert and no distress  EYES: Eyes grossly normal to inspection  HENT: ear canals and TM's normal, nose and mouth without ulcers or " lesions  NECK: no adenopathy, no asymmetry, masses, or scars and thyroid normal to palpation  RESP: lungs clear to auscultation - no rales, rhonchi or wheezes  CV: regular rates and rhythm, normal S1 S2, no S3 or S4, no murmur, click or rub, peripheral pulses strong and no peripheral edema  ABDOMEN: soft, nontender, no hepatosplenomegaly, no masses and bowel sounds normal  MS: no gross musculoskeletal defects noted, no edema  NEURO: Normal strength and tone, sensory exam grossly normal, mentation intact, gait normal including heel/toe/tandem walking and Romberg normal  PSYCH: mentation appears normal, affect normal/bright    Diagnostic Test Results:  Labs reviewed in Epic    ASSESSMENT/PLAN:   (Z00.00) Routine general medical examination at a health care facility  (primary encounter diagnosis)  Comment:   HEALTH CARE MAINTENANCE   Mammo :  7/2019, mammo 2020 ordered.  Colonoscopy: pt reports 2017,  due 2027 per GI, pt prefers to have it done in 2022 due to polyps  Immunizations: TDAP desires today  Influenza vaccination encouraged Fall 2020    Plan: reviewed     (Z23) Encounter for immunization  Comment: due for TDAP  Plan: TDAP VACCINE, IMMUNIZATION ADMIN, FIRST          (K44.9) Hiatal hernia  Comment: on chronic PPI use.  Plan: currently on Prevacid; she desires name brand; once on medicare, may need to reconsider generic options;     (K22.70) Cueto's esophagus without dysplasia  Comment: EGD every 3 years; last done 2017 due 2020; on chronic PPI use.  Plan: LANsoprazole (PREVACID) 30 MG DR capsule        Recommend she discuss with [employer]  and review formulary coverage.     (E53.8) Vitamin B12 deficiency (non anemic)  Comment: pt gets B12 injections monthly; she has recent labs from Dr. Leroy and B12 in upper 500 range; would have thought the level would be higher with monthly injections.  Continue monthly injections; could consider sublingual B12  Plan: cyanocobalamin injection 1,000 mcg          (E55.9)  "Vitamin D deficiency  Comment: weekly therapy in past and desired.  Plan: Vitamin D Deficiency        No recent Vit D level; she has been on Vit D2, could change to vitamin D3, ok to take monthly    (E06.3) Hashimoto's thyroiditis  Comment:pt reports recently she had a dose adjustment due to symptoms; ( Levothyroxine changed from 112 125 mcg per day.)  Plan: SYNTHROID 125 MCG tablet, TSH with free T4         reflex          (Z78.0) Menopause  Comment: she reports hot flashes;  previous hysterectomy;  Dr. Santizo has recommended weaning HRT; mammograms up to date, due this year; order placed.   Plan: estrogen conj (PREMARIN) 0.45 MG tablet          (R73.03) Prediabetes  Comment: A1C was in upper 5 range; reference range indicates normal to 6.0 but results actually consistent with prediabetes.  Recommend further assistance  Plan: Insulin level, Hemoglobin A1c, Comprehensive         metabolic panel (BMP + Alb, Alk Phos, ALT, AST,        Total. Bili, TP)          (E78.5) Hyperlipidemia LDL goal <100  Comment: pt on Crestor but due for labs;   Plan: rosuvastatin (CRESTOR) 5 MG tablet, Lipid panel        reflex to direct LDL Fasting    mammogram  No Clinical Breast Exam done today          Chronic PPI use.  Cueto's Esophagus  She is  Currently on Prevacid, she reports formulary changes.      COUNSELING:  Reviewed preventive health counseling, as reflected in patient instructions       Regular exercise       Healthy diet/nutrition    Estimated body mass index is 28.52 kg/m  as calculated from the following:    Height as of this encounter: 1.689 m (5' 6.5\").    Weight as of this encounter: 81.4 kg (179 lb 6.4 oz).    Weight management plan: Discussed healthy diet and exercise guidelines     reports that she has never smoked. She has never used smokeless tobacco.      Counseling Resources:  ATP IV Guidelines  Pooled Cohorts Equation Calculator  Breast Cancer Risk Calculator  FRAX Risk Assessment  ICSI Preventive " Guidelines  Dietary Guidelines for Americans, 2010  Recommendi's MyPlate  ASA Prophylaxis  Lung CA Screening    Mirela Ballesteros MD  Internal Medicine  [facility] [place]    30 minutes in addition to HEALTH CARE MAINTENANCE are spent with NEW patient, over 50% of that time spent providing counselling, discussing and reviewing medical conditions/concerns, meds and potential side effects.

## 2020-08-18 DIAGNOSIS — E06.3 HASHIMOTO'S THYROIDITIS: ICD-10-CM

## 2020-08-18 DIAGNOSIS — R73.03 PREDIABETES: ICD-10-CM

## 2020-08-18 DIAGNOSIS — E78.5 HYPERLIPIDEMIA LDL GOAL <100: ICD-10-CM

## 2020-08-18 DIAGNOSIS — E55.9 VITAMIN D DEFICIENCY: ICD-10-CM

## 2020-08-18 LAB
ALBUMIN SERPL-MCNC: 3.3 G/DL (ref 3.4–5)
ALP SERPL-CCNC: 63 U/L (ref 40–150)
ALT SERPL W P-5'-P-CCNC: 35 U/L (ref 0–50)
ANION GAP SERPL CALCULATED.3IONS-SCNC: 8 MMOL/L (ref 3–14)
AST SERPL W P-5'-P-CCNC: 30 U/L (ref 0–45)
BILIRUB SERPL-MCNC: 0.4 MG/DL (ref 0.2–1.3)
BUN SERPL-MCNC: 13 MG/DL (ref 7–30)
CALCIUM SERPL-MCNC: 8.7 MG/DL (ref 8.5–10.1)
CHLORIDE SERPL-SCNC: 107 MMOL/L (ref 94–109)
CHOLEST SERPL-MCNC: 195 MG/DL
CO2 SERPL-SCNC: 23 MMOL/L (ref 20–32)
CREAT SERPL-MCNC: 0.6 MG/DL (ref 0.52–1.04)
GFR SERPL CREATININE-BSD FRML MDRD: >90 ML/MIN/{1.73_M2}
GLUCOSE SERPL-MCNC: 118 MG/DL (ref 70–99)
HBA1C MFR BLD: 5.7 % (ref 0–5.6)
HDLC SERPL-MCNC: 51 MG/DL
INSULIN SERPL-ACNC: 24.2 MU/L (ref 3–25)
LDLC SERPL CALC-MCNC: 113 MG/DL
NONHDLC SERPL-MCNC: 144 MG/DL
POTASSIUM SERPL-SCNC: 4.4 MMOL/L (ref 3.4–5.3)
PROT SERPL-MCNC: 7 G/DL (ref 6.8–8.8)
SODIUM SERPL-SCNC: 138 MMOL/L (ref 133–144)
TRIGL SERPL-MCNC: 155 MG/DL
TSH SERPL DL<=0.005 MIU/L-ACNC: 0.65 MU/L (ref 0.4–4)

## 2020-08-18 PROCEDURE — 80053 COMPREHEN METABOLIC PANEL: CPT | Performed by: INTERNAL MEDICINE

## 2020-08-18 PROCEDURE — 36415 COLL VENOUS BLD VENIPUNCTURE: CPT | Performed by: INTERNAL MEDICINE

## 2020-08-18 PROCEDURE — 83525 ASSAY OF INSULIN: CPT | Performed by: INTERNAL MEDICINE

## 2020-08-18 PROCEDURE — 84443 ASSAY THYROID STIM HORMONE: CPT | Performed by: INTERNAL MEDICINE

## 2020-08-18 PROCEDURE — 83036 HEMOGLOBIN GLYCOSYLATED A1C: CPT | Performed by: INTERNAL MEDICINE

## 2020-08-18 PROCEDURE — 82306 VITAMIN D 25 HYDROXY: CPT | Performed by: INTERNAL MEDICINE

## 2020-08-18 PROCEDURE — 80061 LIPID PANEL: CPT | Performed by: INTERNAL MEDICINE

## 2020-08-19 LAB — DEPRECATED CALCIDIOL+CALCIFEROL SERPL-MC: 37 UG/L (ref 20–75)

## 2020-08-25 ENCOUNTER — TELEPHONE (OUTPATIENT)
Dept: FAMILY MEDICINE | Facility: CLINIC | Age: 65
End: 2020-08-25

## 2020-08-25 NOTE — TELEPHONE ENCOUNTER
Prior Authorization Retail Medication Request    Medication/Dose: Premarin 0.45 mg tablets 1 daily   Synthroid 125 mcg 1 daily (DIANA)  ICD code (if different than what is on RX):    Previously Tried and Failed:  PT  has been taking the Premarin since 2009 and works well to control her symptoms of hot flashes and feeling jittery.  has tried to go off and then noted increase in her symptoms.    Synthroid: has been taking DIANA for about 15 years.  Previously tried to take the generic Levothyroxine but noted increase in symptoms and also her levels did not maintain within the normal range.   wants to continue with current medication because it controls her symptoms and is well tolerated.       Rationale:      Insurance Name:  Preferred One  Insurance ID:  [id number]       Pharmacy Information (if different than what is on RX)  Name:    Phone:

## 2020-08-25 NOTE — TELEPHONE ENCOUNTER
Prior Authorization Not Needed per Insurance    Medication: Synthroid 125 mcg (DIANA)  Insurance Company: Preferred One - Phone [phone] Fax [phone]  Expected CoPay:      Pharmacy Filling the Rx: [facility] # [id number] - [place], [place] - [address]   Pharmacy Notified: Yes  Patient Notified: Yes  Pharmacy received paid claim, no PA is needed.    PreferredOne Request #: [id number]  [employer]erred[employer] Tracking Number: [id number]   Patient Name: Luba Nguyen  Practitioner: Mirela Ballesteros  Contact Name: Bettie  Contact Phone: [phone]  Auth Status: Other  Comments: Synthroid is covered without a PA. It is a Non Preferred Brand. If the member has questions about her RX coverage, she can contact customer service at [phone] phone option 3.

## 2020-08-25 NOTE — TELEPHONE ENCOUNTER
PA Initiation    Medication: Synthroid 125 mcg (DIANA)  Insurance Company: Preferred One - Phone [phone] Fax [phone]  Pharmacy Filling the Rx: [facility] # [id number] - [place], [place] - [address]  Filling Pharmacy Phone: [phone]  Filling Pharmacy Fax: [phone]  Start Date: 8/25/2020    Tracking Number is [id number]

## 2020-08-25 NOTE — TELEPHONE ENCOUNTER
PA Initiation    Medication: Premarin 0.45 mg tablets  Insurance Company: Preferred One - Phone [phone] Fax [phone]  Pharmacy Filling the Rx: [facility] # [id number] [place], [place] - [address]  Filling Pharmacy Phone: [phone]  Filling Pharmacy Fax: [phone]  Start Date: 8/25/2020    Tracking Number is [id number]    PA for Synthroid started in separate encounter. Only 1 medication per telephone encounter for tracking purposes.

## 2020-08-25 NOTE — TELEPHONE ENCOUNTER
Prior Authorization Not Needed per Insurance    Medication: Premarin 0.45 mg tablets  Insurance Company: Preferred One - Phone [phone] Fax [phone]  Expected CoPay:      Pharmacy Filling the Rx: [facility] # [id number] - [place], [place] - [address]   Pharmacy Notified: Yes  Patient Notified: Yes  Pharmacy received paid claim, no PA is needed.    Preferred[employer] Request #: [id number]  [employer]erred[employer] Tracking Number: [id number]   Patient Name: Luba Nguyen  Practitioner: Mirela Ballesteros  Contact Name: Bettie  Contact Phone: [phone]  Auth Status: Other  Comments: The member's medication processed without a PA. Her copay for a Preferred Brand is $50 and that is what she was charged. That is her benefit.

## 2020-08-25 NOTE — TELEPHONE ENCOUNTER
Prior Authorization Retail Medication Request     Medication/Dose: Synthroid 125 mcg 1 daily (DIANA)  ICD code (if different than what is on RX):    Previously Tried and Failed:    Synthroid: has been taking DIANA for about 15 years.  Previously tried to take the generic Levothyroxine but noted increase in symptoms and also her levels did not maintain within the normal range.  States wants to continue with current medication because it controls her symptoms and is well tolerated.         Rationale:       Insurance Name:  Preferred One  Insurance ID:  [id number]         Pharmacy Information (if different than what is on RX)  Name:    Phone:

## 2020-08-27 ENCOUNTER — TRANSFERRED RECORDS (OUTPATIENT)
Dept: HEALTH INFORMATION MANAGEMENT | Facility: CLINIC | Age: 65
End: 2020-08-27

## 2020-08-28 ENCOUNTER — TRANSFERRED RECORDS (OUTPATIENT)
Dept: HEALTH INFORMATION MANAGEMENT | Facility: CLINIC | Age: 65
End: 2020-08-28

## 2020-09-03 ENCOUNTER — HOSPITAL ENCOUNTER (OUTPATIENT)
Dept: MAMMOGRAPHY | Facility: CLINIC | Age: 65
Discharge: HOME | End: 2020-09-03
Attending: INTERNAL MEDICINE | Admitting: INTERNAL MEDICINE
Payer: COMMERCIAL

## 2020-09-03 DIAGNOSIS — Z12.31 VISIT FOR SCREENING MAMMOGRAM: ICD-10-CM

## 2020-09-03 PROCEDURE — 77063 BREAST TOMOSYNTHESIS BI: CPT

## 2020-09-21 ENCOUNTER — ALLIED HEALTH/NURSE VISIT (OUTPATIENT)
Dept: NURSING | Facility: CLINIC | Age: 65
End: 2020-09-21
Payer: COMMERCIAL

## 2020-09-21 DIAGNOSIS — E53.8 VITAMIN B 12 DEFICIENCY: Primary | ICD-10-CM

## 2020-09-21 PROCEDURE — 99207 ZZC NO CHARGE NURSE ONLY: CPT

## 2020-09-21 PROCEDURE — 96372 THER/PROPH/DIAG INJ SC/IM: CPT

## 2020-09-21 RX ADMIN — CYANOCOBALAMIN 1000 MCG: 1000 INJECTION, SOLUTION INTRAMUSCULAR; SUBCUTANEOUS at 13:46

## 2020-10-06 ENCOUNTER — TRANSFERRED RECORDS (OUTPATIENT)
Dept: HEALTH INFORMATION MANAGEMENT | Facility: CLINIC | Age: 65
End: 2020-10-06

## 2020-10-22 ENCOUNTER — ALLIED HEALTH/NURSE VISIT (OUTPATIENT)
Dept: NURSING | Facility: CLINIC | Age: 65
End: 2020-10-22
Payer: MEDICARE

## 2020-10-22 DIAGNOSIS — E53.8 VITAMIN B 12 DEFICIENCY: Primary | ICD-10-CM

## 2020-10-22 PROCEDURE — 99207 PR NO CHARGE NURSE ONLY: CPT

## 2020-10-22 PROCEDURE — 96372 THER/PROPH/DIAG INJ SC/IM: CPT

## 2020-10-22 RX ADMIN — CYANOCOBALAMIN 1000 MCG: 1000 INJECTION, SOLUTION INTRAMUSCULAR; SUBCUTANEOUS at 10:08

## 2020-10-22 NOTE — PROGRESS NOTES
The following medication was given:     MEDICATION: Vitamin B12  1000 mcg  ROUTE: IM  SITE: Deltoid - Left  DOSE: 1000 mcg/1 mL  LOT #: [id number]  :  [employer] [place]  EXPIRATION DATE:  Aug 2021  NDC#: 0593-9311-59    Patient verified using name and .    Due to injection administration, patient instructed to remain in clinic for 15 minutes  afterwards, and to report any adverse reaction to me immediately.     Avelino Choudhury CMA ([facility])

## 2020-10-30 ENCOUNTER — ALLIED HEALTH/NURSE VISIT (OUTPATIENT)
Dept: NURSING | Facility: CLINIC | Age: 65
End: 2020-10-30
Payer: MEDICARE

## 2020-10-30 DIAGNOSIS — Z23 ENCOUNTER FOR IMMUNIZATION: Primary | ICD-10-CM

## 2020-10-30 PROCEDURE — G0009 ADMIN PNEUMOCOCCAL VACCINE: HCPCS

## 2020-10-30 PROCEDURE — 90732 PPSV23 VACC 2 YRS+ SUBQ/IM: CPT

## 2020-10-30 PROCEDURE — 99207 PR NO CHARGE NURSE ONLY: CPT

## 2020-11-23 ENCOUNTER — ALLIED HEALTH/NURSE VISIT (OUTPATIENT)
Dept: NURSING | Facility: CLINIC | Age: 65
End: 2020-11-23
Payer: MEDICARE

## 2020-11-23 DIAGNOSIS — E53.8 VITAMIN B 12 DEFICIENCY: Primary | ICD-10-CM

## 2020-11-23 PROCEDURE — 96372 THER/PROPH/DIAG INJ SC/IM: CPT

## 2020-11-23 PROCEDURE — 99207 PR NO CHARGE NURSE ONLY: CPT

## 2020-11-23 RX ADMIN — CYANOCOBALAMIN 1000 MCG: 1000 INJECTION, SOLUTION INTRAMUSCULAR; SUBCUTANEOUS at 14:01

## 2020-11-23 NOTE — NURSING NOTE
Clinic Administered Medication Documentation      Injectable Medication Documentation    Patient was given Cyanocobalamin (B-12). Prior to medication administration, verified patients identity using patient s name and date of birth. Please see MAR and medication order for additional information. Patient instructed to report any adverse reaction to staff immediately .      Was entire vial of medication used? Yes  Vial/Syringe: Single dose vial  Expiration Date:  07/01/2022  Was this medication supplied by the patient? No

## 2020-11-27 DIAGNOSIS — R73.09 ABNORMAL GLUCOSE: Primary | ICD-10-CM

## 2020-11-27 DIAGNOSIS — R73.03 PREDIABETES: ICD-10-CM

## 2020-11-27 NOTE — TELEPHONE ENCOUNTER
Pharmacy notes:  Medicare part B, Rx needs ICD code 10 in the signature. Once daily testing and 90 day supply.

## 2020-11-30 RX ORDER — BLOOD SUGAR DIAGNOSTIC
1 STRIP MISCELLANEOUS DAILY
Qty: 90 STRIP | Refills: 0 | Status: SHIPPED | OUTPATIENT
Start: 2020-11-30 | End: 2020-12-03

## 2020-11-30 NOTE — TELEPHONE ENCOUNTER
A years worth of test strips were sent to [facility] on 9/18/2020.  Now receiving a refill request to [facility] in [place].      Called the pt and she would like her refill at.  She said she switched insurance and would like them at [facility].

## 2020-12-02 DIAGNOSIS — Z78.0 MENOPAUSE: ICD-10-CM

## 2020-12-02 RX ORDER — ESTROGENS, CONJUGATED 0.45 MG/1
TABLET, FILM COATED ORAL
Qty: 90 TABLET | Refills: 1 | OUTPATIENT
Start: 2020-12-02

## 2020-12-02 NOTE — TELEPHONE ENCOUNTER
Premarin ordered to [facility] 8/17/20 #90, 1    Called [facility]- refill remaining.    Adv pt to call [facility] and have them transfer the prescription.     Ale HOOVER RN

## 2020-12-02 NOTE — TELEPHONE ENCOUNTER
Reason for call:  Medication   If this is a refill request, has the caller requested the refill from the pharmacy already? No  Will the patient be using a [facility]? No  Name of the pharmacy and phone number for the current request: [facility]    Name of the medication requested: ACCU-CHEK GUIDE test strip    Other request: Per Pharmacy a new RX with correct diagnosis code needs to be sent. Pt will be picking Rx tomorrow.     Phone number to reach patient:  Cell number on file:    Telephone Information:   Mobile [phone]       Best Time:  nay    Can we leave a detailed message on this number?  Not Applicable    Travel screening: Not Applicable     Shannon Chvaez on 12/2/2020 at 3:22 PM

## 2020-12-02 NOTE — TELEPHONE ENCOUNTER
Spoke with pharmacy and insurance will not pay for dx code prediabetes.    Did not know other options.     Could try abnormal glucose?    Please send prescription with alternative dx.    Martita Lin RN

## 2020-12-03 RX ORDER — BLOOD SUGAR DIAGNOSTIC
1 STRIP MISCELLANEOUS DAILY
Qty: 90 STRIP | Refills: 0 | Status: SHIPPED | OUTPATIENT
Start: 2020-12-03 | End: 2021-01-07

## 2020-12-03 NOTE — TELEPHONE ENCOUNTER
Huddled with Dr. Ballesteros. Verbal ok to change dx to abnormal glucose and send prescription to pharmacy.    Lab Results   Component Value Date    A1C 5.7 08/18/2020    A1C 5.9 07/08/2020     Martita Lin RN

## 2020-12-28 ENCOUNTER — ALLIED HEALTH/NURSE VISIT (OUTPATIENT)
Dept: NURSING | Facility: CLINIC | Age: 65
End: 2020-12-28
Payer: MEDICARE

## 2020-12-28 DIAGNOSIS — E53.8 VITAMIN B 12 DEFICIENCY: Primary | ICD-10-CM

## 2020-12-28 PROCEDURE — 96372 THER/PROPH/DIAG INJ SC/IM: CPT

## 2020-12-28 PROCEDURE — 99207 PR NO CHARGE NURSE ONLY: CPT

## 2020-12-28 RX ADMIN — CYANOCOBALAMIN 1000 MCG: 1000 INJECTION, SOLUTION INTRAMUSCULAR; SUBCUTANEOUS at 09:45

## 2021-01-07 ENCOUNTER — OFFICE VISIT (OUTPATIENT)
Dept: FAMILY MEDICINE | Facility: CLINIC | Age: 66
End: 2021-01-07
Payer: MEDICARE

## 2021-01-07 VITALS
DIASTOLIC BLOOD PRESSURE: 62 MMHG | TEMPERATURE: 97.4 F | HEART RATE: 73 BPM | WEIGHT: 178.2 LBS | SYSTOLIC BLOOD PRESSURE: 110 MMHG | BODY MASS INDEX: 27.97 KG/M2 | HEIGHT: 67 IN | OXYGEN SATURATION: 97 % | RESPIRATION RATE: 15 BRPM

## 2021-01-07 DIAGNOSIS — E06.3 HASHIMOTO'S THYROIDITIS: ICD-10-CM

## 2021-01-07 DIAGNOSIS — Z78.0 MENOPAUSE: ICD-10-CM

## 2021-01-07 DIAGNOSIS — R73.03 PREDIABETES: ICD-10-CM

## 2021-01-07 DIAGNOSIS — R00.2 PALPITATIONS: ICD-10-CM

## 2021-01-07 DIAGNOSIS — E78.5 HYPERLIPIDEMIA LDL GOAL <100: ICD-10-CM

## 2021-01-07 DIAGNOSIS — F43.9 STRESS: Primary | ICD-10-CM

## 2021-01-07 PROCEDURE — 99214 OFFICE O/P EST MOD 30 MIN: CPT | Performed by: INTERNAL MEDICINE

## 2021-01-07 RX ORDER — PANTOPRAZOLE SODIUM 40 MG/1
1 TABLET, DELAYED RELEASE ORAL DAILY
COMMUNITY
Start: 2020-10-13 | End: 2021-08-18

## 2021-01-07 RX ORDER — SERTRALINE HYDROCHLORIDE 25 MG/1
25 TABLET, FILM COATED ORAL DAILY
Qty: 30 TABLET | Refills: 6 | Status: SHIPPED | OUTPATIENT
Start: 2021-01-07 | End: 2021-04-15 | Stop reason: SINTOL

## 2021-01-07 ASSESSMENT — MIFFLIN-ST. JEOR: SCORE: 1381.97

## 2021-01-07 NOTE — PROGRESS NOTES
Assessment & Plan     (F43.9) Stress  (primary encounter diagnosis)  Comment: stressors; palpitation; discussed adding selective serotonin reuptake inhibitor  Sertraline 25 mg  Sertraline 12.5 mg ( half tablet per day) for 6 days then take full dose daily.  Plan: sertraline (ZOLOFT) 25 MG tablet        Reassess in 6 weeks.    (Z78.0) Menopause  Comment: Adjust Premarin to .625 mg    1/2 tablet per day.  Plan: estrogen conj (PREMARIN) 0.625 MG tablet          (R00.2) Palpitations  Comment: reviewed use of selective serotonin reuptake inhibitor to see if palpitations lessen  Plan: sertraline (ZOLOFT) 25 MG tablet        (R73.03) Prediabetes  Comment:   Lab Results   Component Value Date    A1C 5.7 08/18/2020    A1C 5.9 07/08/2020   Diet and exercise  Plan: reviewed goals of treatment    (E78.5) Hyperlipidemia LDL goal <100  Comment:   Lab Results   Component Value Date     08/18/2020        Plan: Rosuvastatin 5 mg 3 times per week; reassess labs in Spring 2021; goals of treatment reviewed.     (E06.3) Hashimoto's thyroiditis  Comment:   TSH   Date Value Ref Range Status   08/18/2020 0.65 0.40 - 4.00 mU/L Final      Plan: continue current dose of thyroid; goals of treatment reviewed.        30 minutes spent on the date of the encounter doing chart review, review of test results, interpretation of tests, patient visit and documentation         Return in about 6 weeks (around 2/18/2021) for palpitation, stressors, HRT.    Mirela Ballesteros MD  Internal Medicine   [facility] JEIMY Toney is a 65 year old who presents to clinic today for the following health issues     HPI       Hyperlipidemia Follow-Up      Are you regularly taking any medication or supplement to lower your cholesterol?   Yes- rosuvastatin    Are you having muscle aches or other side effects that you think could be caused by your cholesterol lowering medication?  No     Recent Labs   Lab Test  "08/18/20  0917   CHOL 195   HDL 51   *   TRIG 155*         Hypothyroidism Follow-up      Since last visit, patient describes the following symptoms: Weight stable,  hair loss,  skin changes,  constipation,  loose stools. Nails dry       PREDIABETES  Lab Results   Component Value Date    A1C 5.7 08/18/2020    A1C 5.9 07/08/2020        Pre diabetes and checks blood sugars about every 2 weeks and around 116 and at times 120-130 in the afternoon.       How many servings of fruits and vegetables do you eat daily?  2-3    On average, how many sweetened beverages do you drink each day (Examples: soda, juice, sweet tea, etc.  Do NOT count diet or artificially sweetened beverages)?   0    How many days per week do you exercise enough to make your heart beat faster? 4    How many minutes a day do you exercise enough to make your heart beat faster? 20 - 29    How many days per week do you miss taking your medication? 0      Review of Systems   CONSTITUTIONAL: NEGATIVE for fever, chills, change in weight  INTEGUMENTARY/SKIN: NEGATIVE for worrisome rashes, moles or lesions  ENT/MOUTH: NEGATIVE for ear, mouth and throat problems  RESP: NEGATIVE for significant cough or SOB  CV: NEGATIVE for chest pain, palpitations or peripheral edema  GI: NEGATIVE for nausea, abdominal pain, heartburn, or change in bowel habits  MUSCULOSKELETAL: NEGATIVE for significant arthralgias or myalgia  NEURO: NEGATIVE for weakness, dizziness or paresthesias  ENDOCRINE: NEGATIVE for temperature intolerance, skin/hair changes  HEME/ALLERGY/IMMUNE: NEGATIVE for bleeding problems  PSYCHIATRIC: NEGATIVE for changes in mood or affect      Objective    /62   Pulse 73   Temp 97.4  F (36.3  C) (Oral)   Resp 15   Ht 1.695 m (5' 6.75\")   Wt 80.8 kg (178 lb 3.2 oz)   SpO2 97%   BMI 28.12 kg/m    Body mass index is 28.12 kg/m .  Physical Exam   GENERAL: healthy, alert and no distress  EYES: Eyes grossly normal to inspection  HENT: ear canals and " TM's normal, nose and mouth without ulcers or lesions  NECK: no adenopathy, no asymmetry, masses, or scars and thyroid normal to palpation  RESP: lungs clear to auscultation - no rales, rhonchi or wheezes  CV: regular rate and rhythm, normal S1 S2, no S3 or S4, no murmur, click or rub, no peripheral edema and peripheral pulses strong  ABDOMEN: soft, nontender, no hepatosplenomegaly, no masses and bowel sounds normal  MS: no gross musculoskeletal defects noted, no edema  NEURO: Normal strength and tone, mentation intact and speech normal  PSYCH: mentation appears normal, affect normal/bright

## 2021-01-07 NOTE — PATIENT INSTRUCTIONS
Adjust Premarin to .625 mg    1/2 tablet per day    Sertraline 25 mg  Sertraline 12.5 mg ( half tablet per day) for 6 days then take full dose daily.    Reassess 6-8 weeks.

## 2021-01-21 ENCOUNTER — AMBULATORY - HEALTHEAST (OUTPATIENT)
Dept: NURSING | Facility: CLINIC | Age: 66
End: 2021-01-21

## 2021-01-28 ENCOUNTER — ALLIED HEALTH/NURSE VISIT (OUTPATIENT)
Dept: NURSING | Facility: CLINIC | Age: 66
End: 2021-01-28
Payer: MEDICARE

## 2021-01-28 VITALS — DIASTOLIC BLOOD PRESSURE: 76 MMHG | HEART RATE: 71 BPM | SYSTOLIC BLOOD PRESSURE: 125 MMHG

## 2021-01-28 DIAGNOSIS — E53.8 VITAMIN B 12 DEFICIENCY: Primary | ICD-10-CM

## 2021-01-28 PROCEDURE — 99207 PR NO CHARGE NURSE ONLY: CPT

## 2021-01-28 PROCEDURE — 96372 THER/PROPH/DIAG INJ SC/IM: CPT

## 2021-01-28 RX ADMIN — CYANOCOBALAMIN 1000 MCG: 1000 INJECTION, SOLUTION INTRAMUSCULAR; SUBCUTANEOUS at 12:01

## 2021-01-28 NOTE — NURSING NOTE
Clinic Administered Medication Documentation      Injectable Medication Documentation    Patient was given Cyanocobalamin (B-12). Prior to medication administration, verified patients identity using patient s name and date of birth. Please see MAR and medication order for additional information. Patient instructed to report any adverse reaction to staff immediately .      Was entire vial of medication used? Yes  Vial/Syringe: Single dose vial  Expiration Date:  06/2022  Was this medication supplied by the patient? No

## 2021-02-11 ENCOUNTER — AMBULATORY - HEALTHEAST (OUTPATIENT)
Dept: NURSING | Facility: CLINIC | Age: 66
End: 2021-02-11

## 2021-02-26 ENCOUNTER — TELEPHONE (OUTPATIENT)
Dept: FAMILY MEDICINE | Facility: CLINIC | Age: 66
End: 2021-02-26

## 2021-02-26 ENCOUNTER — ALLIED HEALTH/NURSE VISIT (OUTPATIENT)
Dept: NURSING | Facility: CLINIC | Age: 66
End: 2021-02-26
Payer: MEDICARE

## 2021-02-26 VITALS — SYSTOLIC BLOOD PRESSURE: 136 MMHG | HEART RATE: 80 BPM | DIASTOLIC BLOOD PRESSURE: 74 MMHG

## 2021-02-26 DIAGNOSIS — E53.8 VITAMIN B 12 DEFICIENCY: Primary | ICD-10-CM

## 2021-02-26 PROCEDURE — 96372 THER/PROPH/DIAG INJ SC/IM: CPT

## 2021-02-26 PROCEDURE — 99207 PR NO CHARGE NURSE ONLY: CPT

## 2021-02-26 RX ADMIN — CYANOCOBALAMIN 1000 MCG: 1000 INJECTION, SOLUTION INTRAMUSCULAR; SUBCUTANEOUS at 11:51

## 2021-02-26 NOTE — TELEPHONE ENCOUNTER
BP Readings from Last 3 Encounters:   02/26/21 136/74   01/28/21 125/76   01/07/21 110/62     Vital Signs 2/26/2021   Systolic 136   Diastolic 74   Pulse 80   Temperature    Respirations    Weight (LB)    Height    BMI (Calculated)    O2      Lisa Magill, RICHA

## 2021-02-26 NOTE — PROGRESS NOTES
Prior to immunization administration, verified patients identity using patient s name and date of birth. Please see Immunization Activity for additional information.     Screening Questionnaire for Adult Immunization    Are you sick today?   No   Do you have allergies to medications, food, a vaccine component or latex?   No   Have you ever had a serious reaction after receiving a vaccination?   No   Do you have a long-term health problem with heart, lung, kidney, or metabolic disease (e.g., diabetes), asthma, a blood disorder, no spleen, complement component deficiency, a cochlear implant, or a spinal fluid leak?  Are you on long-term aspirin therapy?   No   Do you have cancer, leukemia, HIV/AIDS, or any other immune system problem?   No   Do you have a parent, brother, or sister with an immune system problem?   No   In the past 3 months, have you taken medications that affect  your immune system, such as prednisone, other steroids, or anticancer drugs; drugs for the treatment of rheumatoid arthritis, Crohn s disease, or psoriasis; or have you had radiation treatments?   No   Have you had a seizure, or a brain or other nervous system problem?   No   During the past year, have you received a transfusion of blood or blood    products, or been given immune (gamma) globulin or antiviral drug?   No   For women: Are you pregnant or is there a chance you could become       pregnant during the next month?   No   Have you received any vaccinations in the past 4 weeks?   Yes     Immunization questionnaire was positive for at least one answer.  Notified Dr. Gallardo.        Per orders of Dr. Gallardo, injection of B-12 given by Lisa A. Magill, CMA. Patient instructed to remain in clinic for 15 minutes afterwards, and to report any adverse reaction to me immediately.       Screening performed by Lisa A. Magill, CMA on 2/26/2021 at 11:38 AM.

## 2021-02-26 NOTE — PROGRESS NOTES
Clinic Administered Medication Documentation      Injectable Medication Documentation    Patient was given Cyanocobalamin (B-12). Prior to medication administration, verified patients identity using patient s name and date of birth. Please see MAR and medication order for additional information. Patient instructed to report any adverse reaction to staff immediately .      Was entire vial of medication used? Yes  Vial/Syringe: Single dose vial  Expiration Date:  10/2022  Was this medication supplied by the patient? No    Name of provider who requested the medication administration: Dr. Ballesteros.   Name of provider on site (faculty or community preceptor) at the time of performing the medication administration: Dr. Gallardo    Date of next administration: 03/15/21  Date of next office visit with provider to renew medication plan (must be seen annually): none    Lisa Magill, CMA

## 2021-02-26 NOTE — PROGRESS NOTES
Luba Nguyen is a 65 year old patient who comes in today for a Blood Pressure check.  Initial BP:  /74 (BP Location: Right arm, Patient Position: Sitting, Cuff Size: Adult Regular)   Pulse 80      80  Disposition: results routed to provider    Lisa Magill, CMA

## 2021-02-26 NOTE — PROGRESS NOTES
"Chief Complaint   Patient presents with     Allied Health Visit     Blood Pressure Check     Imm/Inj     B-12 injection.      Initial /74 (BP Location: Right arm, Patient Position: Sitting, Cuff Size: Adult Regular)   Pulse 80  Estimated body mass index is 28.12 kg/m  as calculated from the following:    Height as of 1/7/21: 1.695 m (5' 6.75\").    Weight as of 1/7/21: 80.8 kg (178 lb 3.2 oz).  BP completed using cuff size regular right arm    Lisa Magill, CMA    "

## 2021-03-31 ENCOUNTER — E-VISIT (OUTPATIENT)
Dept: FAMILY MEDICINE | Facility: CLINIC | Age: 66
End: 2021-03-31
Payer: MEDICARE

## 2021-03-31 DIAGNOSIS — R39.9 URINARY SYMPTOM OR SIGN: Primary | ICD-10-CM

## 2021-03-31 DIAGNOSIS — N39.0 ACUTE UTI (URINARY TRACT INFECTION): ICD-10-CM

## 2021-03-31 PROCEDURE — 99421 OL DIG E/M SVC 5-10 MIN: CPT | Performed by: INTERNAL MEDICINE

## 2021-04-05 ENCOUNTER — ALLIED HEALTH/NURSE VISIT (OUTPATIENT)
Dept: NURSING | Facility: CLINIC | Age: 66
End: 2021-04-05
Payer: MEDICARE

## 2021-04-05 DIAGNOSIS — E53.8 VITAMIN B 12 DEFICIENCY: Primary | ICD-10-CM

## 2021-04-05 PROCEDURE — 99207 PR NO CHARGE NURSE ONLY: CPT

## 2021-04-05 PROCEDURE — 96372 THER/PROPH/DIAG INJ SC/IM: CPT

## 2021-04-05 RX ADMIN — CYANOCOBALAMIN 1000 MCG: 1000 INJECTION, SOLUTION INTRAMUSCULAR; SUBCUTANEOUS at 14:50

## 2021-04-08 ENCOUNTER — COMMUNICATION - HEALTHEAST (OUTPATIENT)
Dept: INTERNAL MEDICINE | Facility: CLINIC | Age: 66
End: 2021-04-08

## 2021-04-08 DIAGNOSIS — E78.5 HYPERLIPIDEMIA: ICD-10-CM

## 2021-04-08 DIAGNOSIS — Z78.0 POSTMENOPAUSAL: ICD-10-CM

## 2021-04-08 DIAGNOSIS — R39.9 URINARY SYMPTOM OR SIGN: ICD-10-CM

## 2021-04-08 LAB
ALBUMIN UR-MCNC: NEGATIVE MG/DL
APPEARANCE UR: CLEAR
BACTERIA #/AREA URNS HPF: ABNORMAL /HPF
BILIRUB UR QL STRIP: NEGATIVE
COLOR UR AUTO: YELLOW
GLUCOSE UR STRIP-MCNC: NEGATIVE MG/DL
HGB UR QL STRIP: ABNORMAL
KETONES UR STRIP-MCNC: NEGATIVE MG/DL
LEUKOCYTE ESTERASE UR QL STRIP: NEGATIVE
NITRATE UR QL: NEGATIVE
NON-SQ EPI CELLS #/AREA URNS LPF: ABNORMAL /LPF
PH UR STRIP: 5.5 PH (ref 5–7)
RBC #/AREA URNS AUTO: ABNORMAL /HPF
SOURCE: ABNORMAL
SP GR UR STRIP: <=1.005 (ref 1–1.03)
UROBILINOGEN UR STRIP-ACNC: 0.2 EU/DL (ref 0.2–1)
WBC #/AREA URNS AUTO: ABNORMAL /HPF

## 2021-04-08 PROCEDURE — 81001 URINALYSIS AUTO W/SCOPE: CPT | Performed by: INTERNAL MEDICINE

## 2021-04-08 NOTE — PATIENT INSTRUCTIONS
Dear Luba Nguyen    After reviewing your responses, I've been able to diagnose you with a urinary tract infection, which is a common infection of the bladder with bacteria.  This is not a sexually transmitted infection, though urinating immediately after intercourse can help prevent infections.  Drinking lots of fluids is also helpful to clear your current infection and prevent the next one.      I have sent a prescription for antibiotics to your pharmacy to treat this infection.    It is important that you take all of your prescribed medication even if your symptoms are improving after a few doses.  Taking all of your medicine helps prevent the symptoms from returning.     If your symptoms worsen, you develop pain in your back or stomach, develop fevers, or are not improving in 5 days, please contact your primary care provider for an appointment or visit any of our convenient Walk-in or Urgent Care Centers to be seen, which can be found on our website here.    Thanks again for choosing us as your health care partner,    Mirela Ballesteros MD

## 2021-04-15 ENCOUNTER — OFFICE VISIT (OUTPATIENT)
Dept: FAMILY MEDICINE | Facility: CLINIC | Age: 66
End: 2021-04-15
Payer: MEDICARE

## 2021-04-15 VITALS
WEIGHT: 175.8 LBS | TEMPERATURE: 98.1 F | SYSTOLIC BLOOD PRESSURE: 122 MMHG | BODY MASS INDEX: 27.59 KG/M2 | HEIGHT: 67 IN | HEART RATE: 74 BPM | DIASTOLIC BLOOD PRESSURE: 74 MMHG | OXYGEN SATURATION: 97 % | RESPIRATION RATE: 20 BRPM

## 2021-04-15 DIAGNOSIS — Z00.00 WELCOME TO MEDICARE PREVENTIVE VISIT: Primary | ICD-10-CM

## 2021-04-15 DIAGNOSIS — Z12.11 SCREEN FOR COLON CANCER: ICD-10-CM

## 2021-04-15 DIAGNOSIS — Z23 ENCOUNTER FOR IMMUNIZATION: ICD-10-CM

## 2021-04-15 DIAGNOSIS — Z78.0 ASYMPTOMATIC MENOPAUSE: ICD-10-CM

## 2021-04-15 PROCEDURE — G0402 INITIAL PREVENTIVE EXAM: HCPCS | Performed by: INTERNAL MEDICINE

## 2021-04-15 PROCEDURE — 90670 PCV13 VACCINE IM: CPT | Performed by: INTERNAL MEDICINE

## 2021-04-15 PROCEDURE — G0009 ADMIN PNEUMOCOCCAL VACCINE: HCPCS | Performed by: INTERNAL MEDICINE

## 2021-04-15 ASSESSMENT — ENCOUNTER SYMPTOMS
ARTHRALGIAS: 0
CONSTIPATION: 0
ABDOMINAL PAIN: 0
BREAST MASS: 0
HEADACHES: 0
HEMATOCHEZIA: 0
DIARRHEA: 0
SORE THROAT: 0
PARESTHESIAS: 0
SHORTNESS OF BREATH: 0
WEAKNESS: 0
DYSURIA: 0
PALPITATIONS: 0
NERVOUS/ANXIOUS: 0
HEMATURIA: 0
FREQUENCY: 0
FEVER: 0
NAUSEA: 0
JOINT SWELLING: 0
HEARTBURN: 0
MYALGIAS: 0
CHILLS: 0
DIZZINESS: 0
COUGH: 0
EYE PAIN: 0

## 2021-04-15 ASSESSMENT — MIFFLIN-ST. JEOR: SCORE: 1371.08

## 2021-04-15 ASSESSMENT — ACTIVITIES OF DAILY LIVING (ADL): CURRENT_FUNCTION: NO ASSISTANCE NEEDED

## 2021-04-15 NOTE — PATIENT INSTRUCTIONS
Patient Education   Personalized Prevention Plan  You are due for the preventive services outlined below.  Your care team is available to assist you in scheduling these services.  If you have already completed any of these items, please share that information with your care team to update in your medical record.  Health Maintenance Due   Topic Date Due     Osteoporosis Screening  Never done     ANNUAL REVIEW OF HM ORDERS  Never done     Colorectal Cancer Screening  Never done     HIV Screening  Never done     Hepatitis C Screening  Never done     Zoster (Shingles) Vaccine (2 of 3) 05/28/2015

## 2021-04-15 NOTE — NURSING NOTE
Prior to immunization administration, verified patients identity using patient s name and date of birth. Please see Immunization Activity for additional information.     Screening Questionnaire for Adult Immunization    Are you sick today?   No   Do you have allergies to medications, food, a vaccine component or latex?   No   Have you ever had a serious reaction after receiving a vaccination?   No   Do you have a long-term health problem with heart, lung, kidney, or metabolic disease (e.g., diabetes), asthma, a blood disorder, no spleen, complement component deficiency, a cochlear implant, or a spinal fluid leak?  Are you on long-term aspirin therapy?   No   Do you have cancer, leukemia, HIV/AIDS, or any other immune system problem?   No   Do you have a parent, brother, or sister with an immune system problem?   No   In the past 3 months, have you taken medications that affect  your immune system, such as prednisone, other steroids, or anticancer drugs; drugs for the treatment of rheumatoid arthritis, Crohn s disease, or psoriasis; or have you had radiation treatments?   No   Have you had a seizure, or a brain or other nervous system problem?   No   During the past year, have you received a transfusion of blood or blood    products, or been given immune (gamma) globulin or antiviral drug?   No   For women: Are you pregnant or is there a chance you could become       pregnant during the next month?   No   Have you received any vaccinations in the past 4 weeks?   No     Immunization questionnaire answers were all negative.        Per orders of Dr. Ballesteros, injection of Prevnar 13 given by Avelino Choudhury MA. Patient instructed to remain in clinic for 15 minutes afterwards, and to report any adverse reaction to me immediately.       Screening performed by Avelino Choudhury CMA on 4/15/2021 at 4:03 PM.

## 2021-04-15 NOTE — NURSING NOTE
"Chief Complaint   Patient presents with     Medicare Visit     welcome to medicare visit.      Initial /74 (BP Location: Right arm, Patient Position: Sitting, Cuff Size: Adult Regular)   Pulse 74   Temp 98.1  F (36.7  C) (Oral)   Resp 20   Ht 1.695 m (5' 6.75\")   Wt 79.7 kg (175 lb 12.8 oz)   LMP  (LMP Unknown)   SpO2 97%   BMI 27.74 kg/m   Estimated body mass index is 27.74 kg/m  as calculated from the following:    Height as of this encounter: 1.695 m (5' 6.75\").    Weight as of this encounter: 79.7 kg (175 lb 12.8 oz).  BP completed using cuff size regular long right arm    Lisa Magill, CMA    "

## 2021-05-06 ENCOUNTER — ALLIED HEALTH/NURSE VISIT (OUTPATIENT)
Dept: NURSING | Facility: CLINIC | Age: 66
End: 2021-05-06
Payer: MEDICARE

## 2021-05-06 DIAGNOSIS — E53.8 VITAMIN B12 DEFICIENCY (NON ANEMIC): Primary | ICD-10-CM

## 2021-05-06 PROCEDURE — 96372 THER/PROPH/DIAG INJ SC/IM: CPT

## 2021-05-06 PROCEDURE — 99207 PR NO CHARGE NURSE ONLY: CPT

## 2021-05-06 RX ADMIN — CYANOCOBALAMIN 1000 MCG: 1000 INJECTION, SOLUTION INTRAMUSCULAR; SUBCUTANEOUS at 09:40

## 2021-05-06 NOTE — PROGRESS NOTES
Clinic Administered Medication Documentation      Injectable Medication Documentation    Patient was given Cyanocobalamin (B-12). Prior to medication administration, verified patients identity using patient s name and date of birth. Please see MAR and medication order for additional information. Patient instructed to remain in clinic for 15 minutes.      Was entire vial of medication used? Yes  Vial/Syringe: Single dose vial  Expiration Date:  10/31/22  Was this medication supplied by the patient? No     Krystal Guerrero MA

## 2021-05-10 ENCOUNTER — RECORDS - HEALTHEAST (OUTPATIENT)
Dept: ADMINISTRATIVE | Facility: OTHER | Age: 66
End: 2021-05-10

## 2021-05-12 ENCOUNTER — ANCILLARY PROCEDURE (OUTPATIENT)
Dept: BONE DENSITY | Facility: CLINIC | Age: 66
End: 2021-05-12
Attending: INTERNAL MEDICINE
Payer: MEDICARE

## 2021-05-12 DIAGNOSIS — Z78.0 ASYMPTOMATIC MENOPAUSE: ICD-10-CM

## 2021-05-12 PROCEDURE — 77080 DXA BONE DENSITY AXIAL: CPT | Performed by: INTERNAL MEDICINE

## 2021-05-27 NOTE — PROGRESS NOTES
arrived at clinic for Cyanocobalamin injection given Intramuscular at Left deltoid.      Injection was given without incidence.    Theresa MERRITT, RICHA/CMT....................8:49 AM

## 2021-05-27 NOTE — TELEPHONE ENCOUNTER
Dr. Leroy,  Patient would like a 3 month refill of medication requested.  Refill has been set up for you to review.  Theresa MERRITT, RICHA/CMT....................8:52 AM

## 2021-05-28 NOTE — PROGRESS NOTES
Mrs. Nguyen arrived at clinic for Cyanocobalamin injection given Intramuscular at Right deltoid.      Injection was given without incidence.      Theresa MERRITT, RIHCA/ARTIE....................10:11 AM

## 2021-05-28 NOTE — PROGRESS NOTES
"  Office Visit - Follow Up   Luba Nguyen   63 y.o. female    Date of Visit: 4/25/2019         Assessment and Plan   1. Streptococcal sore throat  Has a past h/o strp , multiple grandkids sick but none strep positive. Recommend waiting for test results and conservative care . She has no other signs of systemic disease or LRTI like significant cough  shortness of breath , chest pain or purulent nasal or cough induces secretions  . Because of her Barretts long term NSAID is relatively contraindicated but she is on a PPI and she could take a day or two of NSAID to help the inflamed pharyngitis .  - Rapid Strep A Screen-Throat  - Group A Strep, RNA Direct Detection, Throat    2. Family history of cold sores    - valACYclovir (VALTREX) 1000 MG tablet; Take 1 tablet (1,000 mg total) by mouth 2 (two) times a day. X 1 day for cold sores  Dispense: 30 tablet; Refill: 1            No follow-ups on file.     History of Present Illness   This 63 y.o. old wakes up at night because of her sore throat , still has tonsils , felt chills , last week has some fecal leakage but none this week , no stomach pain , arthralgias, some headache no fever .  or skin rash   No shortness of breath , chest pain . Night sweats , is not immunocompromised   Review of Systems: A comprehensive review of systems was negative except as noted.     Medications, Allergies and Problem List   Reviewed, reconciled and updated  Patient Active Problem List   Diagnosis     Hypertension     Hyperlipidemia     Hypothyroidism     Low serum vitamin D     GERD (gastroesophageal reflux disease)     Hyperglycemia     Low vitamin B12 level     Ischemic chest pain     Heart palpitations          Physical Exam   General Appearance:       BP 96/62 (Patient Site: Left Arm, Patient Position: Sitting, Cuff Size: Adult Large)   Pulse 80   Ht 5' 7.25\" (1.708 m)   Wt 173 lb (78.5 kg)   LMP  (LMP Unknown)   SpO2 98%   BMI 26.89 kg/m       General appearance - alert, " well appearing, and in no distress  Mental status - alert, oriented to person, place, and time  Neck - supple, no significant adenopathy mild cervical LN mildly tender   Lymphatics - no palpable lymphadenopathy, no hepatosplenomegaly  Chest - clear to auscultation, no wheezes, rales or rhonchi, symmetric air entry  Heart - normal rate, regular rhythm, normal S1, S2, no murmurs, rubs, clicks or gallops  Abdomen - soft, nontender, nondistended, no masses or organomegaly  Musculoskeletal - no joint tenderness, deformity or swelling  Extremities - peripheral pulses normal, no pedal edema, no clubbing or cyanosis  Skin - normal coloration and turgor, no rashes, no suspicious skin lesions noted  Ears TM NAD    throat  inflamed and erythematous pharynx, no pus , tonsils not enlarged                                                                                        Additional Information   Current Outpatient Medications   Medication Sig Dispense Refill     cyanocobalamin (VITAMIN B-12) 1,000 mcg/mL injection Inject 1 mL (1,000 mcg total) into the shoulder, thigh, or buttocks every 30 (thirty) days. 10 mL 1     ergocalciferol (ERGOCALCIFEROL) 50,000 unit capsule TAKE ONE CAPSULE BY MOUTH WEEKLY 12 capsule 1     estrogens, conjugated, (PREMARIN) 0.45 MG tablet Take 1 tablet (0.45 mg total) by mouth daily. 90 tablet 3     ibuprofen (ADVIL,MOTRIN) 600 MG tablet Take 1 tablet (600 mg total) by mouth 3 (three) times a day. 30 tablet 2     LACTOBACILLUS RHAMNOSUS GG (CULTURELLE ORAL) Take by mouth.       lansoprazole (PREVACID) 30 MG capsule Take 1 capsule (30 mg total) by mouth daily with breakfast. 90 capsule 3     MELATONIN ORAL Take by mouth.       rosuvastatin (CRESTOR) 5 MG tablet Take 1 tablet Monday Wednesday Friday 90 tablet 3     SYNTHROID 112 mcg tablet Take 1 tablet (112 mcg total) by mouth daily. 90 tablet 3     valACYclovir (VALTREX) 1000 MG tablet 1,000 mg 2 (two) times a day. X 1 day for cold sores              VIT A/VIT C/VIT E/ZINC/COPPER (ICAPS AREDS ORAL) Take by mouth.       Current Facility-Administered Medications   Medication Dose Route Frequency Provider Last Rate Last Dose     cyanocobalamin injection 1,000 mcg  1,000 mcg Intramuscular Q30 Days Theodore Moctezuma MD   1,000 mcg at 04/23/19 1039     Allergies   Allergen Reactions     Other Drug Allergy (See Comments)      Pt states she is allergic to all narcotics      Percocet [Oxycodone-Acetaminophen] Itching     Statins-Hmg-Coa Reductase Inhibitors Other (See Comments)     GI UPSET       Sulfa (Sulfonamide Antibiotics)      Vicodin [Hydrocodone-Acetaminophen] Itching     Zetia [Ezetimibe] Myalgia     Dyazide [Triamterene-Hydrochlorothiazid] Rash     Lisinopril Rash     Prilosec [Omeprazole] Rash     Social History     Tobacco Use     Smoking status: Never Smoker     Smokeless tobacco: Never Used   Substance Use Topics     Alcohol use: Yes     Comment: Occasional     Drug use: Not on file     Past Medical History:   Diagnosis Date     Adenomatous colon polyp     2016     Atrial tachycardia (H) 05/2017     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     Benign positional vertigo      Depression     DISTANT EARLY 40'S     Facial paralysis 2007    RESOLVED     Family history of myocardial infarction      GERD (gastroesophageal reflux disease)     CHRONIC     History of herpes zoster      Hyperglycemia     maternal hyperglycemia     Hyperlipidemia      Hypertension      Hypothyroidism      LBBB (left bundle branch block)      Low serum vitamin D      Low vitamin B12 level      Osteopenia     -1.87=0150     Peripheral neuropathy 2011           Time: total time spent with the patient was 15 minutes of which >50% was spent in counseling and coordination of care     Leelee Zaldivar MD

## 2021-05-28 NOTE — TELEPHONE ENCOUNTER
"Call from pt         CC:  \"I may have pink eye now\"     Recently seen for suspected strep - both rapid and final were negative       Some watering in the R eye noted yesterday      This is worse today with some pain / discomfort and notes that the eye lid appears \"droopy\"      Eye appears pink / red  - discharge is clear/watery - not yellow/green nor any matting material noted       No fever    No blurred vision    No contact lenses       Wondering if provider would Rx ABX eye gtts ?      A/P:   > Warm wash cloths for comfort     > No contacts    > Will page provider         Call to Dr Morrison   > Would suggest Zatador or Nefcon A  (OTC) and prn warm wash cloths as needed      Call back to pt - related the above.   Understands        Burton Wakefield, RN   Triage and Medication Refills        Reason for Disposition    MODERATE eye pain (e.g., interferes with normal activities)    Protocols used: EYE - PUS OR [id number]      "

## 2021-05-28 NOTE — PROGRESS NOTES
Mrs. Nguyen arrived at clinic for Cyanocobalamin injection given Intramuscular at Right deltoid.      Injection was given without incidence.    Theresa MERRITT, RICHA/ARTIE....................10:40 AM

## 2021-05-28 NOTE — TELEPHONE ENCOUNTER
Patient on the schedule this morning for b12- last one received on 4/23- okay to admin a week early? Order is for every 30 days

## 2021-05-29 NOTE — TELEPHONE ENCOUNTER
RN Triage:     Patient has had a cold for the last 2 weeks, sinus congestion and pain under the eyes with a headache. Patient was in on 4/25/19 for a sore throat and this turned into a cold. NO known fever. Patient having thick greenish discharge from the nose. Patient declined appointment as she is in [place].     [place]  [address] [address]  Phone is [phone] Pharmacy.     Please advise on treatment over the phone.     Raquel Munguia RN, BSN [facility] Triage Nurse            Reason for Disposition    [1] Sinus congestion (pressure, fullness) AND [2] present > 10 days    Protocols used: SINUS PAIN OR CONGESTION-A-AH

## 2021-05-30 VITALS — BODY MASS INDEX: 26.07 KG/M2 | WEIGHT: 166.08 LBS | HEIGHT: 67 IN

## 2021-05-30 VITALS — BODY MASS INDEX: 27.15 KG/M2 | HEIGHT: 67 IN | WEIGHT: 173 LBS

## 2021-05-30 VITALS — WEIGHT: 175 LBS | HEIGHT: 67 IN | BODY MASS INDEX: 27.47 KG/M2

## 2021-05-30 NOTE — PROGRESS NOTES
HISTORY/PHYSICAL EXAM  Luba Nguyen   63 y.o. female  Is here for a physical healthcare maintenance examination        Assessment/Plan for  Luba Nguyen is a 63 y.o. female.       1.  Healthcare maintenance examination  2.  Situational anxiety with history of anxiety responding to Prozac.  Will reinstitute.  3.  Dyspnea-possibly deconditioning.  Possibly atrial arrhythmia component.  Will give 10 mg Inderal as needed prior to exercise.  4.  Breast discomfort with normal breast exam.  Upcoming mammogram ultrasound per GYN.  Patient can continue estrogen per myself/GYN.  5.  Chronic insomnia/anxiety-refill Xanax.  PRN.  Prozac has been instituted  6.  Atrial arrhythmia.  Occasionally symptomatic with exercise.    Plan:  1.  Continue current Rx  2.  Prozac 20 Rx  3.  Xanax refill  4.  10 mg Inderal as needed prior to significant exertion  5.  Laboratory 6.  Routine ECG  7.  RTC 3 months        There are no Patient Instructions on file for this visit.    Diagnoses and all orders for this visit:    Routine general medical examination at a health care facility  -     1(CBC and Differential)  -     Erythrocyte Sedimentation Rate  -     Urinalysis-UC if Indicated  -     Basic Metabolic Panel  -     Hepatic Profile  -     Lipid [place]  -     Thyroid [place]  -     HM1 (CBC with Diff)    Situational anxiety  -     FLUoxetine (PROZAC) 20 MG capsule; Take 1 capsule (20 mg total) by mouth daily.  Dispense: 30 capsule; Refill: 2  -     ALPRAZolam (XANAX) 0.25 MG tablet; Take 1 tablet (0.25 mg total) by mouth at bedtime as needed.  Dispense: 30 tablet; Refill: 0    Heart palpitations  -     propranolol (INDERAL) 10 MG tablet; Take 1 tablet (10 mg total) by mouth as needed.  Dispense: 30 tablet; Refill: 1  -     Electrocardiogram Perform - Clinic    Gastroesophageal reflux disease with esophagitis    Hyperlipidemia, unspecified hyperlipidemia type  -     Electrocardiogram Perform - Clinic    Hypothyroidism, unspecified  type            Medications:  Medications after visit  Current Outpatient Medications   Medication Sig Dispense Refill     ALPRAZolam (XANAX) 0.25 MG tablet Take 1 tablet (0.25 mg total) by mouth at bedtime as needed. 30 tablet 0     cyanocobalamin (VITAMIN B-12) 1,000 mcg/mL injection Inject 1 mL (1,000 mcg total) into the shoulder, thigh, or buttocks every 30 (thirty) days. 10 mL 1     ergocalciferol (ERGOCALCIFEROL) 50,000 unit capsule TAKE ONE CAPSULE BY MOUTH WEEKLY 12 capsule 1     estrogens, conjugated, (PREMARIN) 0.45 MG tablet Take 1 tablet (0.45 mg total) by mouth daily. 90 tablet 3     ibuprofen (ADVIL,MOTRIN) 600 MG tablet Take 1 tablet (600 mg total) by mouth 3 (three) times a day. 100 tablet 2     LACTOBACILLUS RHAMNOSUS GG (CULTURELLE ORAL) Take by mouth.       lansoprazole (PREVACID) 30 MG capsule Take 1 capsule (30 mg total) by mouth daily with breakfast. 90 capsule 3     MELATONIN ORAL Take by mouth.       rosuvastatin (CRESTOR) 5 MG tablet Take 1 tablet Monday Wednesday Friday 90 tablet 3     SYNTHROID 112 mcg tablet Take 1 tablet (112 mcg total) by mouth daily. 90 tablet 3     valACYclovir (VALTREX) 1000 MG tablet Take 1 tablet (1,000 mg total) by mouth 2 (two) times a day. X 1 day for cold sores 30 tablet 1     VIT A/VIT C/VIT E/ZINC/COPPER (ICAPS AREDS ORAL) Take by mouth.       FLUoxetine (PROZAC) 20 MG capsule Take 1 capsule (20 mg total) by mouth daily. 30 capsule 2     propranolol (INDERAL) 10 MG tablet Take 1 tablet (10 mg total) by mouth as needed. 30 tablet 1     Current Facility-Administered Medications   Medication Dose Route Frequency Provider Last Rate Last Dose     cyanocobalamin injection 1,000 mcg  1,000 mcg Intramuscular Q30 Days Theodore Moctezuma MD   1,000 mcg at 06/21/19 1021              Perez Leroy MD  Internal medicine  [facility]  [phone]  [email or web address]      This is an electronically verified report by Perez Leroy  M.D.  (Note created with Dragon voice recognition and unintended spelling errors and word substitutions may occur)        SUBJECTIVE/HPI    Chief Complaint:  Annual Exam (Fasting); B12 Injection; and Medication Refill (Alprazolam-not on med list)  Patient is in for annual exam    He is having some anxiety issues  She is starting to  isolate herself socially  She is very happy  She has usual concerns regarding grandchildren, family.  She feels she has completed the grieving process regarding her mom  She has a history of anxiety which is very successfully treated with Prozac in the past without side effect  She is using some at bedtime Xanax.  And has done so with good effect    Patient gets short of breath walking up hills  She is leading a group to [employer]  She is concerned about this  This is been going on 2 years  She did go 7 minutes into her stress testing  Her heart is structurally normal.  She does have a history of atrial dysrhythmia but she does have palpitations.  Metoprolol is too fatiguing.  She really does not get short of breath at any other time.  No PND orthopnea syncope or other palpitations only at the time of exercise    Some breast discomfort.  June check with GYN unremarkable.  On hormone replacement.  Has had a complete hysterectomy.  Has upcoming mammogram ultrasound scheduled.  No family history of breast discomfort    HYPOTHYROIDISM -on replacement.  No symptoms of hypothyroidism-fatigue, temperature intolerance, dry skin, constipation, weight gain, edema, diplopia.  No symptoms of hyperthyroidism-tremor, weight loss, palpitations, diaphoresis, heat intolerance, diarrhea.  Current dose of thyroid medication noted on  medication list and verified.  Last TSH and T4 reviewed        Review of Systems:   Extensive 14-point comprehensive review of systems was performed.   Patient reports  1.  Her appetite is good.  She is exercising 3 times a week.  Along was on a flat surface she has no  issues.    Otherwise, the following systems are negative including constitutional, eyes, ears, nose and throat, cardiovascular, respiratory, gastrointestinal, genitourinary, musculoskeletal,neurological, skin and/or breast, endocrine, hematologic/lymph, allergic/immunologic and psychiatric.  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     Benign positional vertigo      Depression      Facial paralysis 2007     Family history of myocardial infarction      GERD (gastroesophageal reflux disease)      History of herpes zoster      Hyperglycemia      Hyperlipidemia      Hypertension      Hypothyroidism      LBBB (left bundle branch block)      Low serum vitamin D      Low vitamin B12 level      Osteopenia      Peripheral neuropathy 2011     Patient Active Problem List    Diagnosis Date Noted     Ischemic chest pain 05/08/2017     Heart palpitations 05/08/2017     GERD (gastroesophageal reflux disease)      Hyperglycemia      Low vitamin B12 level      Hypertension      Hyperlipidemia      Hypothyroidism      Low serum vitamin D         Family History  Family History   Problem Relation Age of Onset     Hypertension Mother      Stroke Mother      Anemia Mother      Mitral Regurgitation Mother      Atrial fibrillation Mother      Dementia Father      Heart failure Father         HEART DISEASE     Coronary artery disease Father      Breast cancer Maternal Aunt              Medications:  Current Outpatient Medications on File Prior to Visit   Medication Sig     cyanocobalamin (VITAMIN B-12) 1,000 mcg/mL injection Inject 1 mL (1,000 mcg total) into the shoulder, thigh, or buttocks every  30 (thirty) days.     ergocalciferol (ERGOCALCIFEROL) 50,000 unit capsule TAKE ONE CAPSULE BY MOUTH WEEKLY     estrogens, conjugated, (PREMARIN) 0.45 MG tablet Take 1 tablet (0.45 mg total) by mouth daily.     ibuprofen (ADVIL,MOTRIN) 600 MG tablet Take 1 tablet (600 mg total) by mouth 3 (three) times a day.     LACTOBACILLUS RHAMNOSUS GG (CULTURELLE ORAL) Take by mouth.     lansoprazole (PREVACID) 30 MG capsule Take 1 capsule (30 mg total) by mouth daily with breakfast.     MELATONIN ORAL Take by mouth.     rosuvastatin (CRESTOR) 5 MG tablet Take 1 tablet Monday Wednesday Friday     SYNTHROID 112 mcg tablet Take 1 tablet (112 mcg total) by mouth daily.     valACYclovir (VALTREX) 1000 MG tablet Take 1 tablet (1,000 mg total) by mouth 2 (two) times a day. X 1 day for cold sores     VIT A/VIT C/VIT E/ZINC/COPPER (ICAPS AREDS ORAL) Take by mouth.     [DISCONTINUED] ALPRAZolam (XANAX) 0.25 MG tablet Take 0.25 mg by mouth at bedtime as needed.     [DISCONTINUED] azithromycin (ZITHROMAX) 250 MG tablet Take 500 mg (2 x 250 mg tablets) on day 1 followed by 250 mg (1 tablet) on days 2-5.     Current Facility-Administered Medications on File Prior to Visit   Medication     cyanocobalamin injection 1,000 mcg          Allergies:  Allergies   Allergen Reactions     Other Drug Allergy (See Comments)      Pt states she is allergic to all narcotics      Percocet [Oxycodone-Acetaminophen] Itching     Statins-Hmg-Coa Reductase Inhibitors Other (See Comments)     GI UPSET       Sulfa (Sulfonamide Antibiotics)      Vicodin [Hydrocodone-Acetaminophen] Itching     Zetia [Ezetimibe] Myalgia     Dyazide [Triamterene-Hydrochlorothiazid] Rash     Lisinopril Rash     Prilosec [Omeprazole] Rash       PSFHx:   Social History     Socioeconomic History     Marital status:      Spouse name: Not on file     Number of children: Not on file     Years of education: Not on file     Highest education level: Not on file   Occupational History      "Not on file   Social Needs     Financial resource strain: Not on file     Food insecurity:     Worry: Not on file     Inability: Not on file     Transportation needs:     Medical: Not on file     Non-medical: Not on file   Tobacco Use     Smoking status: Never Smoker     Smokeless tobacco: Never Used   Substance and Sexual Activity     Alcohol use: Yes     Comment: Occasional     Drug use: Not on file     Sexual activity: Not on file   Lifestyle     Physical activity:     Days per week: Not on file     Minutes per session: Not on file     Stress: Not on file   Relationships     Social connections:     Talks on phone: Not on file     Gets together: Not on file     Attends Mandaeism service: Not on file     Active member of club or organization: Not on file     Attends meetings of clubs or organizations: Not on file     Relationship status: Not on file     Intimate partner violence:     Fear of current or ex partner: Not on file     Emotionally abused: Not on file     Physically abused: Not on file     Forced sexual activity: Not on file   Other Topics Concern     Not on file   Social History Narrative    SAMMY 1978    4 CHILDREN    RAKESH KANG    2 GRANDCHILDREN    [employer] OW[employer] MAHER NAME-JESSICA               Objective:   /72 (Patient Site: Left Arm, Patient Position: Sitting, Cuff Size: Adult Regular)   Pulse 82   Ht 5' 7.25\" (1.708 m)   Wt 171 lb 1.9 oz (77.6 kg)   LMP  (LMP Unknown)   SpO2 98%   Breastfeeding? No   BMI 26.60 kg/m    Weight:   Wt Readings from Last 3 Encounters:   07/25/19 171 lb 1.9 oz (77.6 kg)   04/25/19 173 lb (78.5 kg)   01/16/19 171 lb 1.3 oz (77.6 kg)       General-appears well, no acute distress.  Very pleasant  Good color  Pulse regular throughout examination    Skin: Normal. No rash or lesion  Lymph Nodes: None palpable-including neck, axilla, inguinal, epitrochlear.  Head:  Normocephalic.    Eyes: Midline.  Equal size., full ROM.  " External exams normal.  No icterus  Ears:  Normal pinnae, canals, and TM's.    Nose:  Patent, without deformity.    Throat:  Moist mucous membranes without lesions, erythema, or exudate.    Neck: No palpable masses, lymphadenopathy or tenderness.No thyromegaly or goiter.  No thyroid nodule.  Carotid Arteries:  No Bruit.  Carotid upstroke normal  Chest Wall: No  pain elicited on compression.  Axilla no adenopathy  Breast normal shape  No palpable mass  Patient does have a mild pectus angulation  Respiratory:  Normal respiratory effort.  Lungs are clear with good breath sounds.  No dullness.  No wheezing.  Heart: Regular rhythm.  Normal sounding S1, S2 without S3, S4, murmurs, rubs, or gallops.    Abdomen:  The abdomen was flat, soft and nontender without guarding rebound or masses.  There are normal bowel sounds.  There is no hepatosplenomegaly.  There is no palpable enlargement of the aorta.  Pelvic rectal deferred GYN  Extremities:  Full ROM without limitation, deformity or edema.    4+ pulses  Neurologic-intact- No focal deficit.  Speech clear.  Coordination normal.  Strength symmetric  Orthopedic-no arthropathy.

## 2021-05-31 VITALS — WEIGHT: 175.08 LBS | HEIGHT: 67 IN | BODY MASS INDEX: 27.48 KG/M2

## 2021-05-31 VITALS — HEIGHT: 67 IN | BODY MASS INDEX: 27.62 KG/M2 | WEIGHT: 176 LBS

## 2021-05-31 VITALS — WEIGHT: 175 LBS | HEIGHT: 67 IN | BODY MASS INDEX: 27.47 KG/M2

## 2021-05-31 VITALS — WEIGHT: 172.12 LBS | HEIGHT: 67 IN | BODY MASS INDEX: 27.01 KG/M2

## 2021-05-31 VITALS — HEIGHT: 67 IN | BODY MASS INDEX: 27.01 KG/M2 | WEIGHT: 172.08 LBS

## 2021-05-31 VITALS — BODY MASS INDEX: 26.84 KG/M2 | WEIGHT: 171.04 LBS | HEIGHT: 67 IN

## 2021-06-01 VITALS — WEIGHT: 172.12 LBS | BODY MASS INDEX: 27.01 KG/M2 | HEIGHT: 67 IN

## 2021-06-01 VITALS — HEIGHT: 67 IN | BODY MASS INDEX: 26.07 KG/M2 | WEIGHT: 166.12 LBS

## 2021-06-01 VITALS — BODY MASS INDEX: 27.79 KG/M2 | WEIGHT: 178.75 LBS

## 2021-06-01 VITALS — BODY MASS INDEX: 27 KG/M2 | WEIGHT: 172 LBS | HEIGHT: 67 IN

## 2021-06-01 NOTE — TELEPHONE ENCOUNTER
RN cannot approve Refill Request    RN can NOT refill this medication med is not covered by policy/route to provider.       Lakshmi Rea, [facility] Triage/Med Refill 9/27/2019    Requested Prescriptions   Pending Prescriptions Disp Refills     ergocalciferol (ERGOCALCIFEROL) 50,000 unit capsule [Pharmacy Med Name: Vitamin D (Ergocalciferol) Oral Capsule 19019 UNIT] 12 capsule 3     Sig: TAKE ONE CAPSULE BY MOUTH WEEKLY       There is no refill protocol information for this order      Signed Prescriptions Disp Refills    rosuvastatin (CRESTOR) 5 MG tablet 36 tablet 3     Sig: TAKE 1 TABLET BY MOUTH ON MONDAY, WEDNESDAY, AND FRIDAY       Statins Refill Protocol (Hmg CoA Reductase Inhibitors) Passed - 9/26/2019  7:53 AM        Passed - PCP or prescribing provider visit in past 12 months      Last office visit with prescriber/PCP: 1/16/2019 Perez Leroy MD OR same dept: 4/25/2019 Leelee Zaldivar MD OR same specialty: 4/25/2019 Leelee Zaldivar MD  Last physical: 7/25/2019 Last MTM visit: Visit date not found   Next visit within 3 mo: Visit date not found  Next physical within 3 mo: Visit date not found  Prescriber OR PCP: Perez Leroy MD  Last diagnosis associated with med order: 1. Postmenopausal  - ergocalciferol (ERGOCALCIFEROL) 50,000 unit capsule [Pharmacy Med Name: Vitamin D (Ergocalciferol) Oral Capsule 98928 UNIT]; TAKE ONE CAPSULE BY MOUTH WEEKLY   Dispense: 12 capsule; Refill: 3    2. Hyperlipidemia  - rosuvastatin (CRESTOR) 5 MG tablet; TAKE 1 TABLET BY MOUTH ON MONDAY, WEDNESDAY, AND FRIDAY  Dispense: 36 tablet; Refill: 3    If protocol passes may refill for 12 months if within 3 months of last provider visit (or a total of 15 months).

## 2021-06-01 NOTE — TELEPHONE ENCOUNTER
Refill Approved    Rx renewed per Medication Renewal Policy. Medication was last renewed on 9/25/18.    Lakshmi Rea, [facility] Triage/Med Refill 9/27/2019     Requested Prescriptions   Pending Prescriptions Disp Refills     ergocalciferol (ERGOCALCIFEROL) 50,000 unit capsule [Pharmacy Med Name: Vitamin D (Ergocalciferol) Oral Capsule 56008 UNIT] 12 capsule 0     Sig: TAKE ONE CAPSULE BY MOUTH WEEKLY       There is no refill protocol information for this order        rosuvastatin (CRESTOR) 5 MG tablet [Pharmacy Med Name: Rosuvastatin Calcium Oral Tablet 5 MG] 30 tablet 2     Sig: TAKE 1 TABLET BY MOUTH ON MONDAY, WEDNESDAY, AND FRIDAY       Statins Refill Protocol (Hmg CoA Reductase Inhibitors) Passed - 9/26/2019  7:53 AM        Passed - PCP or prescribing provider visit in past 12 months      Last office visit with prescriber/PCP: 1/16/2019 Perez Leroy MD OR same dept: 4/25/2019 Leelee Zaldivar MD OR same specialty: 4/25/2019 Leelee Zaldivar MD  Last physical: 7/25/2019 Last MTM visit: Visit date not found   Next visit within 3 mo: Visit date not found  Next physical within 3 mo: Visit date not found  Prescriber OR PCP: Perez Leroy MD  Last diagnosis associated with med order: 1. Postmenopausal  - ergocalciferol (ERGOCALCIFEROL) 50,000 unit capsule [Pharmacy Med Name: Vitamin D (Ergocalciferol) Oral Capsule 46501 UNIT]; TAKE ONE CAPSULE BY MOUTH WEEKLY  Dispense: 12 capsule; Refill: 0    2. Hyperlipidemia  - rosuvastatin (CRESTOR) 5 MG tablet [Pharmacy Med Name: Rosuvastatin Calcium Oral Tablet 5 MG]; TAKE 1 TABLET BY MOUTH ON MONDAY, WEDNESDAY, AND FRIDAY   Dispense: 30 tablet; Refill: 2    If protocol passes may refill for 12 months if within 3 months of last provider visit (or a total of 15 months).

## 2021-06-01 NOTE — PROGRESS NOTES
Ms. Ireland arrived at clinic for Cyanocobalamin injection given Intramuscular at Right deltoid.      Injection was given without incidence.

## 2021-06-02 VITALS — HEIGHT: 67 IN | WEIGHT: 173 LBS | BODY MASS INDEX: 27.15 KG/M2

## 2021-06-02 VITALS — HEIGHT: 67 IN | BODY MASS INDEX: 27 KG/M2 | WEIGHT: 172.04 LBS

## 2021-06-02 VITALS — HEIGHT: 67 IN | WEIGHT: 171.08 LBS | BODY MASS INDEX: 26.85 KG/M2

## 2021-06-02 NOTE — TELEPHONE ENCOUNTER
Refill Approved    Rx renewed per Medication Renewal Policy. Medication was last renewed on 12/24/18.    Krystin Helms, [facility] Triage/Med Refill 10/29/2019     Requested Prescriptions   Pending Prescriptions Disp Refills     lansoprazole (PREVACID) 30 MG capsule [Pharmacy Med Name: Lansoprazole Oral Capsule Delayed Release 30 MG] 90 capsule 2     Sig: TAKE ONE CAPSULE BY MOUTH ONE TIME DAILY WITH BREAKFAST       GI Medications Refill Protocol Passed - 10/29/2019  9:57 AM        Passed - PCP or prescribing provider visit in last 12 or next 3 months.     Last office visit with prescriber/PCP: 1/16/2019 Perez Leroy MD OR same dept: 4/25/2019 Leelee Zaldivar MD OR same specialty: 4/25/2019 Leelee Zaldivar MD  Last physical: 7/25/2019 Last MTM visit: Visit date not found   Next visit within 3 mo: Visit date not found  Next physical within 3 mo: Visit date not found  Prescriber OR PCP: Perez Leroy MD  Last diagnosis associated with med order: 1. GERD (gastroesophageal reflux disease)  - lansoprazole (PREVACID) 30 MG capsule [Pharmacy Med Name: Lansoprazole Oral Capsule Delayed Release 30 MG]; TAKE ONE CAPSULE BY MOUTH ONE TIME DAILY WITH BREAKFAST  Dispense: 90 capsule; Refill: 2    If protocol passes may refill for 12 months if within 3 months of last provider visit (or a total of 15 months).

## 2021-06-02 NOTE — PROGRESS NOTES
OFFICE VISIT NOTE  Luba Nguyen   64 y.o. female            Assessment/Plan for  Luba Nguyen is a 64 y.o. female.  No Patient Care Coordination Note on file.             1.  History of atrial dysrhythmia.  Quiescent.  Has not had to use PRN Inderal  2.  Situational anxiety  Off SSRI- felt dulled on the medication and zombie like    Rare judicious use of PRN Xanax  Discussed use of possible PRN beta-blocker  3.  Low B12- administered today  4.    Hypothyroidism-clinically euthyroid-on replacement.  5.  Postmenopausal on hormonal therapy -followed by GYN  Plan:  Continue current Rx  Reviewed laboratory-this past July  RTC 6 months  There are no Patient Instructions on file for this visit.    Diagnoses and all orders for this visit:    Hypothyroidism, unspecified type    Heart palpitations    Screen for colon cancer  -     Ambulatory referral for Colonoscopy    Low vitamin B12 level    Situational anxiety        Medications after visit  Current Outpatient Medications   Medication Sig Dispense Refill     ALPRAZolam (XANAX) 0.25 MG tablet Take 1 tablet (0.25 mg total) by mouth at bedtime as needed. 30 tablet 0     cyanocobalamin (VITAMIN B-12) 1,000 mcg/mL injection Inject 1 mL (1,000 mcg total) into the shoulder, thigh, or buttocks every 30 (thirty) days. 10 mL 1     ergocalciferol (ERGOCALCIFEROL) 50,000 unit capsule TAKE ONE CAPSULE BY MOUTH WEEKLY  12 capsule 3     estrogens, conjugated, (PREMARIN) 0.45 MG tablet Take 1 tablet (0.45 mg total) by mouth daily. 90 tablet 3     LACTOBACILLUS RHAMNOSUS GG (CULTURELLE ORAL) Take by mouth.       lansoprazole (PREVACID) 30 MG capsule TAKE ONE CAPSULE BY MOUTH ONE TIME DAILY WITH BREAKFAST 90 capsule 1     magnesium 200 mg Tab Take 1 tablet by mouth.       propranolol (INDERAL) 10 MG tablet Take 1 tablet (10 mg total) by mouth as needed. 30 tablet 1     rosuvastatin (CRESTOR) 5 MG tablet TAKE 1 TABLET BY MOUTH ON MONDAY, WEDNESDAY, AND FRIDAY 36 tablet 3      SYNTHROID 112 mcg tablet Take 1 tablet (112 mcg total) by mouth daily. 90 tablet 3     valACYclovir (VALTREX) 1000 MG tablet Take 1 tablet (1,000 mg total) by mouth 2 (two) times a day. X 1 day for cold sores 30 tablet 1     VIT A/VIT C/VIT E/ZINC/COPPER (ICAPS AREDS ORAL) Take by mouth.       Current Facility-Administered Medications   Medication Dose Route Frequency Provider Last Rate Last Dose     cyanocobalamin injection 1,000 mcg  1,000 mcg Intramuscular Q30 Days Theodore Moctezuma MD   1,000 mcg at 10/31/19 1014                      Perez Leroy MD  Internal medicine  [facility]  [phone]  [email or web address]    Much or all of the text in this note was generated through the use of Dragon Dictate voice-to-text software. Errors in spelling or words which seem out of context are unintentional.   Sound alike errors, in particular, may have escaped editing.                 Subjective:   Chief Complaint:  Hypertension; Follow-up; and B12 Injection    Follow-up chronic issues    History of atrial dysrhythmia.  Has been quiescent.  Has not used beta-blocker.    Felt dull and zombielike on SSRI.  This was discontinued.    HYPOTHYROIDISM -on replacement.  No symptoms of hypothyroidism-fatigue, temperature intolerance, dry skin, constipation, weight gain, edema, diplopia.  No symptoms of hyperthyroidism-tremor, weight loss, palpitations, diaphoresis, heat intolerance, diarrhea.  Current dose of thyroid medication noted on  medication list and verified.  Last TSH and T4 reviewed    Review of Systems:     Extensive 10-point review of systems was performed. Please see the HPI for problem specific pertinent review of systems.     Patient does note she is active  She is traveled well  She has a recent upper respiratory infection which is resolving.  Recent trip to [place]-medically uneventful otherwise    Otherwise, the following systems are noncontributory including constitutional,  eyes, ears, nose and throat, cardiovascular, respiratory, gastrointestinal, genitourinary, musculoskeletal,neurological, skin and/or breast, endocrine, hematologic/lymph, allergic/immunologic and psychiatric.              Medications:  Current Outpatient Medications on File Prior to Visit   Medication Sig     ALPRAZolam (XANAX) 0.25 MG tablet Take 1 tablet (0.25 mg total) by mouth at bedtime as needed.     cyanocobalamin (VITAMIN B-12) 1,000 mcg/mL injection Inject 1 mL (1,000 mcg total) into the shoulder, thigh, or buttocks every 30 (thirty) days.     ergocalciferol (ERGOCALCIFEROL) 50,000 unit capsule TAKE ONE CAPSULE BY MOUTH WEEKLY      estrogens, conjugated, (PREMARIN) 0.45 MG tablet Take 1 tablet (0.45 mg total) by mouth daily.     LACTOBACILLUS RHAMNOSUS GG (CULTURELLE ORAL) Take by mouth.     lansoprazole (PREVACID) 30 MG capsule TAKE ONE CAPSULE BY MOUTH ONE TIME DAILY WITH BREAKFAST     magnesium 200 mg Tab Take 1 tablet by mouth.     propranolol (INDERAL) 10 MG tablet Take 1 tablet (10 mg total) by mouth as needed.     rosuvastatin (CRESTOR) 5 MG tablet TAKE 1 TABLET BY MOUTH ON MONDAY, WEDNESDAY, AND FRIDAY     SYNTHROID 112 mcg tablet Take 1 tablet (112 mcg total) by mouth daily.     valACYclovir (VALTREX) 1000 MG tablet Take 1 tablet (1,000 mg total) by mouth 2 (two) times a day. X 1 day for cold sores     VIT A/VIT C/VIT E/ZINC/COPPER (ICAPS AREDS ORAL) Take by mouth.     [DISCONTINUED] FLUoxetine (PROZAC) 20 MG capsule Take 1 capsule (20 mg total) by mouth daily.     [DISCONTINUED] ibuprofen (ADVIL,MOTRIN) 600 MG tablet Take 1 tablet (600 mg total) by mouth 3 (three) times a day.     [DISCONTINUED] MELATONIN ORAL Take by mouth.     Current Facility-Administered Medications on File Prior to Visit   Medication     cyanocobalamin injection 1,000 mcg            Allergies:  Allergies   Allergen Reactions     Other Drug Allergy (See Comments)      Pt states she is allergic to all narcotics      Percocet  "[Oxycodone-Acetaminophen] Itching     Statins-Hmg-Coa Reductase Inhibitors Other (See Comments)     GI UPSET       Sulfa (Sulfonamide Antibiotics)      Vicodin [Hydrocodone-Acetaminophen] Itching     Zetia [Ezetimibe] Myalgia     Dyazide [Triamterene-Hydrochlorothiazid] Rash     Lisinopril Rash     Prilosec [Omeprazole] Rash       PSFHx: Tobacco Status:  She  reports that she has never smoked. She has never used smokeless tobacco.   Alcohol Status:    Social History     Substance and Sexual Activity   Alcohol Use Yes    Comment: Occasional       reports that she has never smoked. She has never used smokeless tobacco. She reports current alcohol use. No history on file for drug.    Objective:    /82 (Patient Site: Left Arm, Patient Position: Sitting, Cuff Size: Adult Regular)   Pulse 70   Ht 5' 7.25\" (1.708 m)   Wt 174 lb 1.9 oz (79 kg)   LMP  (LMP Unknown)   Breastfeeding? No   BMI 27.07 kg/m    Weight:   Wt Readings from Last 3 Encounters:   10/31/19 174 lb 1.9 oz (79 kg)   07/25/19 171 lb 1.9 oz (77.6 kg)   04/25/19 173 lb (78.5 kg)     BP Readings from Last 10 Encounters:   10/31/19 136/82   07/25/19 120/72   04/25/19 96/62   01/16/19 126/84   10/24/18 128/76   09/10/18 134/76   08/08/18 144/66   05/11/18 124/76   03/21/18 110/60   01/05/18 126/80         General-appears well, no acute distress.  Pulses regular throughout  Lungs are clear  Cardiac exam is regular without murmur  There isno edema      Review of clinical lab tests  Lab Results   Component Value Date    WBC 6.4 07/25/2019    HGB 14.4 07/25/2019    HCT 43.2 07/25/2019     07/25/2019    CHOL 184 07/25/2019    TRIG 122 07/25/2019    HDL 59 07/25/2019    ALT 26 07/25/2019    AST 24 07/25/2019     07/25/2019    K 4.4 07/25/2019     07/25/2019    CREATININE 0.69 07/25/2019    BUN 13 07/25/2019    CO2 27 07/25/2019    TSH 0.34 07/25/2019    HGBA1C 5.8 04/08/2016       Glucose   Date/Time Value Ref Range Status   07/25/2019 " 12:10  70 - 125 mg/dL Final   01/16/2019 03:54 PM 94 70 - 125 mg/dL Final   05/11/2018 04:31 PM 96 70 - 125 mg/dL Final   05/05/2017 09:05  70 - 125 mg/dL Final   04/08/2016 08:53  74 - 125 mg/dL Final   04/26/2015 07:40  (H) 70 - 125 mg/dL Final     No results found for this or any previous visit (from the past 24 hour(s)).    RADIOLOGY: No results found.    Review of recent consultation-

## 2021-06-03 VITALS
SYSTOLIC BLOOD PRESSURE: 136 MMHG | HEIGHT: 67 IN | DIASTOLIC BLOOD PRESSURE: 82 MMHG | BODY MASS INDEX: 27.33 KG/M2 | WEIGHT: 174.12 LBS | HEART RATE: 70 BPM

## 2021-06-03 VITALS — WEIGHT: 171.12 LBS | BODY MASS INDEX: 26.86 KG/M2 | HEIGHT: 67 IN

## 2021-06-03 NOTE — TELEPHONE ENCOUNTER
Dr. Leroy,  Patient is coming to clinic today for a B12 injection.  Last injection was 10/31/19.  Okay to give injection early?  Please advise.  Thank you.  Theresa MERRITT, RICHA/ARTIE....................7:04 AM

## 2021-06-04 VITALS
DIASTOLIC BLOOD PRESSURE: 86 MMHG | HEART RATE: 84 BPM | WEIGHT: 175.4 LBS | SYSTOLIC BLOOD PRESSURE: 125 MMHG | BODY MASS INDEX: 27.27 KG/M2

## 2021-06-04 VITALS
DIASTOLIC BLOOD PRESSURE: 94 MMHG | HEIGHT: 67 IN | WEIGHT: 176 LBS | OXYGEN SATURATION: 98 % | SYSTOLIC BLOOD PRESSURE: 142 MMHG | BODY MASS INDEX: 27.62 KG/M2 | HEART RATE: 78 BPM

## 2021-06-04 VITALS
BODY MASS INDEX: 28.09 KG/M2 | HEIGHT: 67 IN | DIASTOLIC BLOOD PRESSURE: 76 MMHG | OXYGEN SATURATION: 98 % | WEIGHT: 179 LBS | SYSTOLIC BLOOD PRESSURE: 102 MMHG | HEART RATE: 76 BPM

## 2021-06-04 NOTE — PROGRESS NOTES
Mrs. Nguyen arrived at clinic for Cyanocobalamin injection given Intramuscular at Left deltoid.      Injection was given without incidence.      Theresa MERRITT, RICHA/ARTIE....................11:29 AM

## 2021-06-05 NOTE — TELEPHONE ENCOUNTER
Dr. Leroy,  Patient was scheduled for a B12 injection today, 1/13/2020.  Spoke with the patient and let her know that her last B12 was 12/30/19, so she is not due until 1/30/2020.  She states that she will be leaving out of town for about 3 weeks on 1/17/2020.  Please advise as to when she should get her B12, before or after she returns.  Thank you.  Theresa MERRITT, RICHA/ARTIE....................8:15 AM

## 2021-06-06 NOTE — PROGRESS NOTES
Ms. Nguyen arrived at clinic for Cyanocobalamin injection given Intramuscular at Right deltoid.      Injection was given without incidence.

## 2021-06-06 NOTE — PROGRESS NOTES
Office Visit   Luba Nguyen   64 y.o. female    Date of Visit: 2/27/2020    Chief Complaint   Patient presents with     Constipation     Bloating, fatigue for 2 weeks     Rash     Itchy on stomach for a couple days        Assessment and Plan   1. Fatigue, unspecified type  She has been feeling more tired for the last 3 weeks or so.  We reviewed that her thyroid hormone level is normal and if anything on the higher end of normal.  Therefore I do not think that the fatigue is related to this.  It could be due to a viral illness.  She is not having any significant symptoms or signs of infection.  She has been tested in the past for sleep apnea.  My recommendation is that she monitor her symptoms over the next few weeks and hopefully she will start feeling better soon.    2. Slow transit constipation  She has been more constipated recently.  Again we reviewed her thyroid levels.  Recommend that she work on increasing her exercise and water intake.  Also I discussed the recommendation from Dr. Leroy yesterday that she start using MiraLAX.  Again her abdominal exam is benign and she is not having any alarming.    3. Hypothyroidism, unspecified type  We reviewed her recent thyroid levels.  She is wondering about seeing an endocrinologist.  I reviewed that for the past couple of years her TSH has been normal and I recommend holding off on a consultation.       No follow-ups on file.     History of Present Illness   This 64 y.o. old female comes in with fatigue and constipation.  States she has been feeling more tired for the last few weeks.  She was on vacation and then came home early.  She did not have any fevers or chills or upper respiratory symptoms.  No GI symptoms such as diarrhea.  She has developed constipation over the last couple of weeks.  She is wondering if it could be related to her thyroid being under replaced.  She had blood work earlier this week that showed a normal CBC and thyroid levels.  She  has no other acute concerns.  She has noted a little bit of itching on her abdomen.    Review of Systems: As above, systems otherwise reviewed and negative.     Medications, Allergies and Problem List   Patient Active Problem List   Diagnosis     Hypertension     Hyperlipidemia     Hypothyroidism     Low serum vitamin D     GERD (gastroesophageal reflux disease)     Hyperglycemia     Low vitamin B12 level     Ischemic chest pain (H)     Heart palpitations     Current Outpatient Medications   Medication Sig Dispense Refill     ALPRAZolam (XANAX) 0.25 MG tablet Take 1 tablet (0.25 mg total) by mouth at bedtime as needed. 30 tablet 0     cyanocobalamin (VITAMIN B-12) 1,000 mcg/mL injection Inject 1 mL (1,000 mcg total) into the shoulder, thigh, or buttocks every 30 (thirty) days. 10 mL 1     ergocalciferol (ERGOCALCIFEROL) 50,000 unit capsule TAKE ONE CAPSULE BY MOUTH WEEKLY  12 capsule 3     estrogens, conjugated, (PREMARIN) 0.45 MG tablet Take 1 tablet (0.45 mg total) by mouth daily. 90 tablet 3     LACTOBACILLUS RHAMNOSUS GG (CULTURELLE ORAL) Take by mouth.       lansoprazole (PREVACID) 30 MG capsule TAKE ONE CAPSULE BY MOUTH ONE TIME DAILY WITH BREAKFAST 90 capsule 1     magnesium 200 mg Tab Take 1 tablet by mouth.       multivitamin therapeutic tablet Take 1 tablet by mouth daily.       propranolol (INDERAL) 10 MG tablet Take 1 tablet (10 mg total) by mouth as needed. 30 tablet 1     rosuvastatin (CRESTOR) 5 MG tablet TAKE 1 TABLET BY MOUTH ON MONDAY, WEDNESDAY, AND FRIDAY 36 tablet 3     SYNTHROID 112 mcg tablet Take 1 tablet (112 mcg total) by mouth daily. 90 tablet 3     valACYclovir (VALTREX) 1000 MG tablet Take 1 tablet (1,000 mg total) by mouth 2 (two) times a day. X 1 day for cold sores 30 tablet 1     VIT A/VIT C/VIT E/ZINC/COPPER (ICAPS AREDS ORAL) Take by mouth.       Current Facility-Administered Medications   Medication Dose Route Frequency Provider Last Rate Last Dose     cyanocobalamin injection  "1,000 mcg  1,000 mcg Intramuscular Q30 Days Theodore Moctezuma MD   1,000 mcg at 02/11/20 7246     Allergies   Allergen Reactions     Other Drug Allergy (See Comments)      Pt states she is allergic to all narcotics      Percocet [Oxycodone-Acetaminophen] Itching     Statins-Hmg-Coa Reductase Inhibitors Other (See Comments)     GI UPSET       Sulfa (Sulfonamide Antibiotics)      Vicodin [Hydrocodone-Acetaminophen] Itching     Zetia [Ezetimibe] Myalgia     Dyazide [Triamterene-Hydrochlorothiazid] Rash     Lisinopril Rash     Prilosec [Omeprazole] Rash          Physical Exam     /76 (Patient Site: Right Arm, Patient Position: Sitting)   Pulse 76   Ht 5' 7.25\" (1.708 m)   Wt 179 lb (81.2 kg)   LMP  (LMP Unknown)   SpO2 98%   BMI 27.83 kg/m      General:  Patient is alert and in no apparent distress.  Cardiovascular:  Regular rate and rhythm, normal S1/S2, no murmurs, rubs, or gallop.  Pulmonary:  Lungs are clear to auscultation bilaterally with normal respiratory effort.  Gastrointestinal:  Abdomen is soft, non-tender, non-distended, with no organomegaly, rebound or guarding.  Skin:  Warm and well perfused with no rashes or abnormalities.  There are a few excoriated papules on her abdomen but no vesicles or rash.         Additional Information   Social History     Tobacco Use     Smoking status: Never Smoker     Smokeless tobacco: Never Used   Substance Use Topics     Alcohol use: Yes     Comment: Occasional     Drug use: Not on file            Bessie Silvestre MD    "

## 2021-06-06 NOTE — TELEPHONE ENCOUNTER
Please advise if you want to double book her in, or have her see another provider? Labs only? Thank you.    Ellie Billings, CMA

## 2021-06-07 ENCOUNTER — ALLIED HEALTH/NURSE VISIT (OUTPATIENT)
Dept: NURSING | Facility: CLINIC | Age: 66
End: 2021-06-07
Payer: MEDICARE

## 2021-06-07 VITALS — SYSTOLIC BLOOD PRESSURE: 124 MMHG | DIASTOLIC BLOOD PRESSURE: 78 MMHG

## 2021-06-07 DIAGNOSIS — E53.8 VITAMIN B12 DEFICIENCY (NON ANEMIC): Primary | ICD-10-CM

## 2021-06-07 PROCEDURE — 96372 THER/PROPH/DIAG INJ SC/IM: CPT

## 2021-06-07 PROCEDURE — 99207 PR NO CHARGE NURSE ONLY: CPT

## 2021-06-07 RX ADMIN — CYANOCOBALAMIN 1000 MCG: 1000 INJECTION, SOLUTION INTRAMUSCULAR; SUBCUTANEOUS at 13:35

## 2021-06-07 NOTE — TELEPHONE ENCOUNTER
Spoke with patient and she is aware. Her mail order can not fill this, but it can go through [facility].    Ellie Billings, CMA

## 2021-06-07 NOTE — TELEPHONE ENCOUNTER
Central PA team  [phone]  Pool: HE PA MED ([id number])          PA has been initiated.       PA form completed and faxed insurance via Cover My Meds     Key:  [id number]     Medication:  SYNTHROID 112 MCG    Insurance:  PREFERRED ONE        Response will be received via fax and may take up to 5-10 business days depending on plan

## 2021-06-07 NOTE — PROGRESS NOTES
Clinic Administered Medication Documentation      Injectable Medication Documentation    Patient was given Cyanocobalamin (B-12). Prior to medication administration, verified patients identity using patient s name and date of birth. Please see MAR and medication order for additional information. Patient instructed to remain in clinic for 15 minutes.      Was entire vial of medication used? Yes  Vial/Syringe: Single dose vial  Expiration Date:  1/31/2023  Was this medication supplied by the patient? No

## 2021-06-07 NOTE — TELEPHONE ENCOUNTER
Prior Authorization Request  Who s requesting:  Pharmacy  Pharmacy Name and Location: [facility]  Medication Name: Synthroid 112mcg  Insurance Plan: [employer] One  Insurance Member ID Number:  [id number]  CoverMyMeds Key: N/A  Informed patient that prior authorizations can take up to 10 business days for response:   NA  Okay to leave a detailed message: Yes

## 2021-06-07 NOTE — TELEPHONE ENCOUNTER
Refill Approved    Rx renewed per Medication Renewal Policy. Medication was last renewed on 4/22/19.    Lakshmi Rea, [facility] Triage/Med Refill 3/20/2020     Requested Prescriptions   Pending Prescriptions Disp Refills     estrogens, conjugated, (PREMARIN) 0.45 MG tablet 90 tablet 3     Sig: Take 1 tablet (0.45 mg total) by mouth daily.       Hormone Replacement Therapy Refill Protocol Passed - 3/20/2020 12:04 PM        Passed - PCP or prescribing provider visit in past 12 months       Last office visit with prescriber/PCP: 10/31/2019 Perez Leroy MD OR same dept: 2/27/2020 Besise Silvestre MD OR same specialty: 2/27/2020 Bessie Silvestre MD  Last physical: 7/25/2019 Last MTM visit: Visit date not found     Next visit within 3 mo: Visit date not found  Next physical within 3 mo: Visit date not found  Prescriber OR PCP: Perez Leroy MD  Last diagnosis associated with med order: 1. Hot flashes  - estrogens, conjugated, (PREMARIN) 0.45 MG tablet; Take 1 tablet (0.45 mg total) by mouth daily.  Dispense: 90 tablet; Refill: 3    2. Postmenopausal  - estrogens, conjugated, (PREMARIN) 0.45 MG tablet; Take 1 tablet (0.45 mg total) by mouth daily.  Dispense: 90 tablet; Refill: 3    3. Hypothyroidism  - SYNTHROID 112 mcg tablet; Take 1 tablet (112 mcg total) by mouth daily.  Dispense: 90 tablet; Refill: 3     If protocol passes may refill for 3 months.               SYNTHROID 112 mcg tablet 90 tablet 3     Sig: Take 1 tablet (112 mcg total) by mouth daily.       Thyroid Hormones Protocol Passed - 3/20/2020 12:04 PM        Passed - Provider visit in past 12 months or next 3 months     Last office visit with prescriber/PCP: 10/31/2019 Perez Leroy MD OR same dept: 2/27/2020 Bessie Silvestre MD OR same specialty: 2/27/2020 Bessie Silvestre MD  Last physical: 7/25/2019 Last MTM visit: Visit date not found   Next visit within 3 mo: Visit date not found  Next physical within 3 mo: Visit date not  found  Prescriber OR PCP: Perez Leroy MD  Last diagnosis associated with med order: 1. Hot flashes  - estrogens, conjugated, (PREMARIN) 0.45 MG tablet; Take 1 tablet (0.45 mg total) by mouth daily.  Dispense: 90 tablet; Refill: 3    2. Postmenopausal  - estrogens, conjugated, (PREMARIN) 0.45 MG tablet; Take 1 tablet (0.45 mg total) by mouth daily.  Dispense: 90 tablet; Refill: 3    3. Hypothyroidism  - SYNTHROID 112 mcg tablet; Take 1 tablet (112 mcg total) by mouth daily.  Dispense: 90 tablet; Refill: 3    If protocol passes may refill for 12 months if within 3 months of last provider visit (or a total of 15 months).             Passed - TSH on file in past 12 months for patient age 12 & older     TSH   Date Value Ref Range Status   02/24/2020 1.29 0.30 - 5.00 uIU/mL Final

## 2021-06-07 NOTE — PROGRESS NOTES
"Luba Nguyen is a 64 y.o. female who is being evaluated via a billable telephone visit.      The patient has been notified of following:     \"This telephone visit will be conducted via a call between you and your physician/provider. We have found that certain health care needs can be provided without the need for a physical exam.  This service lets us provide the care you need with a short phone conversation.  If a prescription is necessary we can send it directly to your pharmacy.  If lab work is needed we can place an order for that and you can then stop by our lab to have the test done at a later time.    Telephone visits are billed at different rates depending on your insurance coverage. During this emergency period, for some insurers they may be billed the same as an in-person visit.  Please reach out to your insurance provider with any questions.    If during the course of the call the physician/provider feels a telephone visit is not appropriate, you will not be charged for this service.\"    Patient has given verbal consent to a Telephone visit? Yes    Patient would like to receive their AVS by AVS Preference: Yesika.    Luba Nguyen complains of    Chief Complaint   Patient presents with     Follow-up     Medication Questions     Discuss B12     Medication Refill     Weight Gain     Concerned about water weight       I have reviewed and updated the patient's Past Medical History, Social History, Family History and Medication List.    ALLERGIES  Other drug allergy (see comments); Percocet [oxycodone-acetaminophen]; Statins-hmg-coa reductase inhibitors; Sulfa (sulfonamide antibiotics); Vicodin [hydrocodone-acetaminophen]; Zetia [ezetimibe]; Dyazide [triamterene-hydrochlorothiazid]; Lisinopril; and Prilosec [omeprazole]       Telephone VISIT NOTE  Luba Nguyen   64 y.o. female           Major complaint weight gain and some constipation weight increase 172-177.  There is no edema.  Does feel " bloating on occasion abdomen.  She is not having regular bowel movements  Occasional incontinence-small amount of stool after walking    She has upcoming colonoscopy  No blood or mucus  She is having complete hysterectomy  She has no edema  No PND orthopnea or dyspnea  Recent TSH in the normal range however not as low as it is been in the past.  Free thyroid was within normal limits.    Her thyroid had been decreased a bit having some tach and atrial tachyarrhythmias from 0.125 down to 0.112.  She was under a lot of stress at that time as she was taking care of her mother who was in a terminal decline.    Patient feels very well emotionally at this time.  She does use Xanax at at bedtime    Tolerating Monday Wednesday Friday cholesterol medication.    Her GERD is under good control.  She has been on chronic PPI                Assessment/Plan for  Luba Nguyen is a 64 y.o. female.  No Patient Care Coordination Note on file.       1. Weight gain  -I am wondering if this is due to her constipation and thyroid.  We will give her magnesium 1 tablet daily along with her fiber.  Will increase the thyroid to 0.125.  She will monitor her heart rate.  She does have PRN Inderal if needed  She will follow-up in 3 months with lab draw  She will update me in a couple weeks  2. Hyperlipidemia  On Rx  - rosuvastatin (CRESTOR) 5 MG tablet; TAKE 1 TABLET BY MOUTH ON MONDAY, WEDNESDAY, AND FRIDAY  Dispense: 36 tablet; Refill: 3    3. Situational anxiety  Doing well  - ALPRAZolam (XANAX) 0.25 MG tablet; Take 1 tablet (0.25 mg total) by mouth at bedtime as needed.  Dispense: 90 tablet; Refill: 0    4. Postmenopausal  On continue PPI continue PPI placement through GYN  - ergocalciferol (ERGOCALCIFEROL) 1,250 mcg (50,000 unit) capsule; Take once weekly  Dispense: 12 capsule; Refill: 3    5. GERD (gastroesophageal reflux disease)    - lansoprazole (PREVACID) 30 MG capsule; TAKE ONE CAPSULE BY MOUTH ONE TIME DAILY WITH BREAKFAST   Dispense: 90 capsule; Refill: 3    6. Family history of cold sores  Med refill  - valACYclovir (VALTREX) 1000 MG tablet; Take 1 tablet (1,000 mg total) by mouth 2 (two) times a day. X 1 day for cold sores  Dispense: 30 tablet; Refill: 1    7. Hypothyroidism, unspecified type   Continue discussed above.  Increase thyroid 0.125.  Monitor status.         Plan:  As above  Med refill  Thyroid adjustment  Magnesium Feivor  Update me in 2 months 2 to 3 weeks    There are no Patient Instructions on file for this visit.    Diagnoses and all orders for this visit:    Weight gain    Hyperlipidemia  -     rosuvastatin (CRESTOR) 5 MG tablet; TAKE 1 TABLET BY MOUTH ON MONDAY, WEDNESDAY, AND FRIDAY  Dispense: 36 tablet; Refill: 3    Situational anxiety  -     ALPRAZolam (XANAX) 0.25 MG tablet; Take 1 tablet (0.25 mg total) by mouth at bedtime as needed.  Dispense: 90 tablet; Refill: 0    Postmenopausal  -     ergocalciferol (ERGOCALCIFEROL) 1,250 mcg (50,000 unit) capsule; Take once weekly  Dispense: 12 capsule; Refill: 3    GERD (gastroesophageal reflux disease)  -     lansoprazole (PREVACID) 30 MG capsule; TAKE ONE CAPSULE BY MOUTH ONE TIME DAILY WITH BREAKFAST  Dispense: 90 capsule; Refill: 3    Family history of cold sores  -     valACYclovir (VALTREX) 1000 MG tablet; Take 1 tablet (1,000 mg total) by mouth 2 (two) times a day. X 1 day for cold sores  Dispense: 30 tablet; Refill: 1    Hypothyroidism, unspecified type        Medications after visit  Current Outpatient Medications   Medication Sig Dispense Refill     ALPRAZolam (XANAX) 0.25 MG tablet Take 1 tablet (0.25 mg total) by mouth at bedtime as needed. 30 tablet 0     cyanocobalamin (VITAMIN B-12) 1,000 mcg/mL injection Inject 1 mL (1,000 mcg total) into the shoulder, thigh, or buttocks every 30 (thirty) days. 10 mL 1     ergocalciferol (ERGOCALCIFEROL) 50,000 unit capsule TAKE ONE CAPSULE BY MOUTH WEEKLY  12 capsule 3     estrogens, conjugated, (PREMARIN) 0.45 MG tablet  Take 1 tablet (0.45 mg total) by mouth daily. 90 tablet 0     LACTOBACILLUS RHAMNOSUS GG (CULTURELLE ORAL) Take by mouth.       lansoprazole (PREVACID) 30 MG capsule TAKE ONE CAPSULE BY MOUTH ONE TIME DAILY WITH BREAKFAST 90 capsule 1     magnesium 200 mg Tab Take 1 tablet by mouth.       multivitamin therapeutic tablet Take 1 tablet by mouth daily.       propranolol (INDERAL) 10 MG tablet Take 1 tablet (10 mg total) by mouth as needed. 30 tablet 1     rosuvastatin (CRESTOR) 5 MG tablet TAKE 1 TABLET BY MOUTH ON MONDAY, WEDNESDAY, AND FRIDAY 36 tablet 3     SYNTHROID 112 mcg tablet Take 1 tablet (112 mcg total) by mouth daily. 90 tablet 3     valACYclovir (VALTREX) 1000 MG tablet Take 1 tablet (1,000 mg total) by mouth 2 (two) times a day. X 1 day for cold sores 30 tablet 1     VIT A/VIT C/VIT E/ZINC/COPPER (ICAPS AREDS ORAL) Take by mouth.       Current Facility-Administered Medications   Medication Dose Route Frequency Provider Last Rate Last Dose     cyanocobalamin injection 1,000 mcg  1,000 mcg Intramuscular Q30 Days Theodore Moctezuma MD   1,000 mcg at 02/11/20 1356                      Perez Leroy MD  Internal medicine  [facility]  [phone]  [email or web address]    Much or all of the text in this note was generated through the use of Dragon Dictate voice-to-text software. Errors in spelling or words which seem out of context are unintentional.   Sound alike errors, in particular, may have escaped editing.                  Medications:  Current Outpatient Medications on File Prior to Visit   Medication Sig     ALPRAZolam (XANAX) 0.25 MG tablet Take 1 tablet (0.25 mg total) by mouth at bedtime as needed.     cyanocobalamin (VITAMIN B-12) 1,000 mcg/mL injection Inject 1 mL (1,000 mcg total) into the shoulder, thigh, or buttocks every 30 (thirty) days.     ergocalciferol (ERGOCALCIFEROL) 50,000 unit capsule TAKE ONE CAPSULE BY MOUTH WEEKLY      estrogens, conjugated,  (PREMARIN) 0.45 MG tablet Take 1 tablet (0.45 mg total) by mouth daily.     LACTOBACILLUS RHAMNOSUS GG (CULTURELLE ORAL) Take by mouth.     lansoprazole (PREVACID) 30 MG capsule TAKE ONE CAPSULE BY MOUTH ONE TIME DAILY WITH BREAKFAST     magnesium 200 mg Tab Take 1 tablet by mouth.     multivitamin therapeutic tablet Take 1 tablet by mouth daily.     propranolol (INDERAL) 10 MG tablet Take 1 tablet (10 mg total) by mouth as needed.     rosuvastatin (CRESTOR) 5 MG tablet TAKE 1 TABLET BY MOUTH ON MONDAY, WEDNESDAY, AND FRIDAY     SYNTHROID 112 mcg tablet Take 1 tablet (112 mcg total) by mouth daily.     valACYclovir (VALTREX) 1000 MG tablet Take 1 tablet (1,000 mg total) by mouth 2 (two) times a day. X 1 day for cold sores     VIT A/VIT C/VIT E/ZINC/COPPER (ICAPS AREDS ORAL) Take by mouth.     Current Facility-Administered Medications on File Prior to Visit   Medication     cyanocobalamin injection 1,000 mcg           PSFHx: Tobacco Status:  She  reports that she has never smoked. She has never used smokeless tobacco.   Alcohol Status:    Social History     Substance and Sexual Activity   Alcohol Use Yes    Comment: Occasional       reports that she has never smoked. She has never used smokeless tobacco. She reports current alcohol use. No history on file for drug.    Objective:    /86 Comment: Patient reported  Pulse 84 Comment: Patient reported  Wt 175 lb 6.4 oz (79.6 kg) Comment: Patient reported  LMP  (LMP Unknown)   Breastfeeding No   BMI 27.27 kg/m    Weight:   Wt Readings from Last 3 Encounters:   04/10/20 175 lb 6.4 oz (79.6 kg)   02/27/20 179 lb (81.2 kg)   10/31/19 174 lb 1.9 oz (79 kg)     BP Readings from Last 10 Encounters:   04/10/20 125/86   02/27/20 102/76   10/31/19 136/82   07/25/19 120/72   04/25/19 96/62   01/16/19 126/84   10/24/18 128/76   09/10/18 134/76   08/08/18 144/66   05/11/18 124/76        Review of clinical lab tests  Lab Results   Component Value Date    WBC 8.2 02/24/2020     HGB 13.2 02/24/2020    HCT 39.1 02/24/2020     02/24/2020    CHOL 184 07/25/2019    TRIG 122 07/25/2019    HDL 59 07/25/2019    ALT 26 07/25/2019    AST 24 07/25/2019     07/25/2019    K 4.4 07/25/2019     07/25/2019    CREATININE 0.69 07/25/2019    BUN 13 07/25/2019    CO2 27 07/25/2019    TSH 1.29 02/24/2020    HGBA1C 5.8 04/08/2016       Glucose   Date/Time Value Ref Range Status   07/25/2019 12:10  70 - 125 mg/dL Final   01/16/2019 03:54 PM 94 70 - 125 mg/dL Final   05/11/2018 04:31 PM 96 70 - 125 mg/dL Final   05/05/2017 09:05  70 - 125 mg/dL Final   04/08/2016 08:53  74 - 125 mg/dL Final   04/26/2015 07:40  (H) 70 - 125 mg/dL Final     No results found for this or any previous visit (from the past 24 hour(s)).    RADIOLOGY: No results found.  Phone call duration:  20 minutes    Ellie Billings, CMA

## 2021-06-08 NOTE — TELEPHONE ENCOUNTER
Refill Approved    Rx renewed per Medication Renewal Policy. Medication was last renewed on 3/20/20.    Lakshmi eRa, [facility] [place] Connection Triage/Med Refill 6/18/2020     Requested Prescriptions   Pending Prescriptions Disp Refills     estrogens, conjugated, (PREMARIN) 0.45 MG tablet 90 tablet 0     Sig: Take 1 tablet (0.45 mg total) by mouth daily.       Hormone Replacement Therapy Refill Protocol Passed - 6/18/2020  8:43 AM        Passed - PCP or prescribing provider visit in past 12 months       Last office visit with prescriber/PCP: 10/31/2019 Perez Leroy MD OR same dept: 2/27/2020 Bessie Silvestre MD OR same specialty: 2/27/2020 Bessie Silvestre MD  Last physical: 7/25/2019 Last MTM visit: Visit date not found     Next visit within 3 mo: Visit date not found  Next physical within 3 mo: Visit date not found  Prescriber OR PCP: Perez Leroy MD  Last diagnosis associated with med order: 1. Hot flashes  - estrogens, conjugated, (PREMARIN) 0.45 MG tablet; Take 1 tablet (0.45 mg total) by mouth daily.  Dispense: 90 tablet; Refill: 0    2. Postmenopausal  - estrogens, conjugated, (PREMARIN) 0.45 MG tablet; Take 1 tablet (0.45 mg total) by mouth daily.  Dispense: 90 tablet; Refill: 0     If protocol passes may refill for 3 months.

## 2021-06-08 NOTE — TELEPHONE ENCOUNTER
New Appointment Needed  What is the reason for the visit:    Same Date/Next Day Appt Request  What is the reason for your visit?: omayra rueda at [facility]    Provider Preference:   How soon do you need to be seen?: July 15 or after  Waitlist offered?: No  Okay to leave a detailed message:  Yes

## 2021-06-08 NOTE — PROGRESS NOTES
OFFICE VISIT NOTE  Luba Nguyen   61 y.o. female      Subjective:   Chief Complaint:  Pain (Temple pain. Left sided.); Sore Throat; Facial Pain (nasal congestion); and Injections (B12)    Sinus congestion, hoarseness, ear popping.  Upcoming trip to [place].  No lower respiratory symptoms  Left-sided temporal pain resolved    Finally recovered from her clinical gastroenteritis.  We have given her Zofran which was helpful.  It took her 6-7 days to feel better    She had a sinus congestion and infection in October-[facility] she has had normal CBCs her thyroids have been fine.  She did have exposure at [place].      Asking about B12 injections..  Has low documented vitamin B12 and vitamin D.  She has no bowel issues.    Osteopenia.  Last DEXA 2010.  GYN recommended decreasing her estrogen which she did appoint for 5 ultimately to go to 0.3.  We discussed benefits versus risk    Review of Systems:     Extensive 10-point review of systems was performed. Please see the HPI for problem specific pertinent review of systems.     Patient does note chronic constipation.  Does not do well with fiber-excessive gas     Otherwise, the following systems are noncontributory including constitutional, eyes, ears, nose and throat, cardiovascular, respiratory, gastrointestinal, genitourinary, musculoskeletal,neurological, skin and/or breast, endocrine, hematologic/lymph, allergic/immunologic and psychiatric.              Medications:  Current Outpatient Prescriptions   Medication Sig Dispense Refill     ALPRAZolam (XANAX) 0.25 MG tablet Take 0.25 mg by mouth as needed for sleep or anxiety (PRN WHILE TRAVELING).       cyanocobalamin (VITAMIN B-12) 1,000 mcg/mL injection Inject 1 mL (1,000 mcg total) into the shoulder, thigh, or buttocks every 30 (thirty) days. 10 mL 1     diphenhydrAMINE (BENADRYL) 25 mg capsule Take 25 mg by mouth bedtime as needed for itching or sleep.       diphenhydrAMINE-acetaminophen (TYLENOL  PM EXTRA STRENGTH)  mg Tab Take 1 tablet by mouth bedtime as needed.       estrogens, conjugated, (PREMARIN) 0.45 MG tablet Take 0.45 mg by mouth daily.       ibuprofen (ADVIL,MOTRIN) 800 MG tablet Take 1 tablet (800 mg total) by mouth every 8 (eight) hours as needed for pain. 60 tablet 2     lansoprazole (PREVACID) 30 MG capsule Take 30 mg by mouth daily with breakfast.       levothyroxine (SYNTHROID) 125 MCG tablet Take 1 tablet (125 mcg total) by mouth Daily at 6:00 am. 90 tablet 3     ondansetron (ZOFRAN, AS HYDROCHLORIDE,) 4 MG tablet Take 1 tablet (4 mg total) by mouth 4 (four) times a day as needed for nausea. 30 tablet 1     VITAMIN D2 50,000 unit capsule TAKE 1 CAPSULE BY MOUTH EVERY WEEK 26 capsule 2     azithromycin (ZITHROMAX) 250 MG tablet Take 1 tablet (250 mg total) by mouth daily for 5 days. Take 500 mg (2 x 250 mg tablets) on day 1 followed by 250 mg (1 tablet) on days 2-5. 5 tablet 1     cyanocobalamin, vitamin B-12, 1,000 mcg/mL Kit Inject 1,000 mcg as directed every 7 days. 1 kit 1     docusate sodium (COLACE) 100 MG capsule Take 1 capsule (100 mg total) by mouth daily as needed for constipation. 30 capsule 2     Current Facility-Administered Medications   Medication Dose Route Frequency Provider Last Rate Last Dose     cyanocobalamin injection 1,000 mcg  1,000 mcg Intramuscular Q30 Days Perez Leroy MD   1,000 mcg at 08/03/16 1050     cyanocobalamin injection 1,000 mcg  1,000 mcg Intramuscular Q30 Days Theodore Moctezuma MD   1,000 mcg at 12/01/16 1138     Current Outpatient Prescriptions on File Prior to Visit   Medication Sig     ALPRAZolam (XANAX) 0.25 MG tablet Take 0.25 mg by mouth as needed for sleep or anxiety (PRN WHILE TRAVELING).     cyanocobalamin (VITAMIN B-12) 1,000 mcg/mL injection Inject 1 mL (1,000 mcg total) into the shoulder, thigh, or buttocks every 30 (thirty) days.     diphenhydrAMINE (BENADRYL) 25 mg capsule Take 25 mg by mouth bedtime as needed for itching or sleep.      diphenhydrAMINE-acetaminophen (TYLENOL PM EXTRA STRENGTH)  mg Tab Take 1 tablet by mouth bedtime as needed.     ibuprofen (ADVIL,MOTRIN) 800 MG tablet Take 1 tablet (800 mg total) by mouth every 8 (eight) hours as needed for pain.     lansoprazole (PREVACID) 30 MG capsule Take 30 mg by mouth daily with breakfast.     levothyroxine (SYNTHROID) 125 MCG tablet Take 1 tablet (125 mcg total) by mouth Daily at 6:00 am.     ondansetron (ZOFRAN, AS HYDROCHLORIDE,) 4 MG tablet Take 1 tablet (4 mg total) by mouth 4 (four) times a day as needed for nausea.     VITAMIN D2 50,000 unit capsule TAKE 1 CAPSULE BY MOUTH EVERY WEEK     [DISCONTINUED] lansoprazole (PREVACID) 30 MG capsule TAKE 1 CAPSULE BY MOUTH TWICE A DAY (Patient taking differently: daily)     [DISCONTINUED] PREMARIN 0.625 mg tablet TAKE 1 TABLET BY MOUTH DAILY     cyanocobalamin, vitamin B-12, 1,000 mcg/mL Kit Inject 1,000 mcg as directed every 7 days.     Current Facility-Administered Medications on File Prior to Visit   Medication     cyanocobalamin injection 1,000 mcg     cyanocobalamin injection 1,000 mcg       Allergies:  Allergies   Allergen Reactions     Other Drug Allergy (See Comments)      Pt states she is allergic to all narcotics      Percocet [Oxycodone-Acetaminophen] Itching     Statins-Hmg-Coa Reductase Inhibitors Other (See Comments)     GI UPSET       Sulfa (Sulfonamide Antibiotics)      Vicodin [Hydrocodone-Acetaminophen] Itching     Zetia [Ezetimibe] Myalgia     Dyazide [Triamterene-Hydrochlorothiazid] Rash     Lisinopril Rash     Prilosec [Omeprazole] Rash       PSFHx: Tobacco Status:  She  reports that she has never smoked. She does not have any smokeless tobacco history on file.   Alcohol Status:    History   Alcohol Use     Yes     Comment: Occasional       reports that she has never smoked. She does not have any smokeless tobacco history on file. She reports that she drinks alcohol. Her drug history is not on file.    Objective:   "    Visit Vitals     /86     Pulse 94     Temp 97.8  F (36.6  C)     Ht 5' 7.25\" (1.708 m)     Wt 166 lb 1.3 oz (75.3 kg)     LMP  (LMP Unknown)     SpO2 98%     Breastfeeding No     BMI 25.82 kg/m2     Weight:   Wt Readings from Last 3 Encounters:   01/04/17 166 lb 1.3 oz (75.3 kg)   08/22/16 169 lb (76.7 kg)   04/08/16 169 lb (76.7 kg)     BP Readings from Last 3 Encounters:   01/04/17 138/86   08/22/16 (!) 132/92   04/08/16 108/76         General-appears well, no acute distress.  TMs slightly gray otherwise negative  Nares congested  Throat normal  No neck adenopathy  Lungs clear  No murmur  No edema          Review of clinical lab tests  Lab Results   Component Value Date    WBC 6.2 04/08/2016    HGB 14.2 04/08/2016    HCT 42.1 04/08/2016     04/08/2016    CHOL 274 (H) 04/08/2016    TRIG 190 (H) 04/08/2016    HDL 58 04/08/2016     04/08/2016    K 4.2 04/08/2016     04/08/2016    CREATININE 0.67 04/08/2016    BUN 17 04/08/2016    CO2 27 04/08/2016    TSH 0.28 (L) 04/08/2016    HGBA1C 5.8 04/08/2016     VITAMIN D, TOTAL (25-HYDROXY)   Date Value Ref Range Status   11/11/2015 31.9 30.0 - 80.0 ng/mL Final         No results found for this or any previous visit (from the past 24 hour(s)).    RADIOLOGY: No results found. reviewed bone density 2010 mild osteopenia       Assessment/Plan for  Luba Nguyen is a 61 y.o. female.  No Patient Care Coordination Note on file.       There are no diagnoses linked to this encounter.   1.  Acute sinusitis-Rx azithromycin.  Check strep screen check CBC with recurrence  2.  Nonspecific fatigue probably due to recent infections.  Of check a CBC will also check her thyroid  3.  Postmenopausal on hormone-discussion-going down to 0.3 ultimately  4.  Mild osteopenia-recheck vitamin D.  Discussed calcium.  Gave sheet for calcium    Plan:  As detailed above    Patient Instructions       Combined calcium and vitamin D supplementation appears to reduce fracture " risk in older adults.  ?The optimal intake of calcium and vitamin D in postmenopausal women with osteoporosis, 1200 mg of calcium daily (total diet plus supplement) and 1000 international units of vitamin D daily are advised.        Foods and drinks with calcium     Food Calcium, milligrams   Milk (skim, 2 percent, or whole, 8 oz [240 mL]) 300   Yogurt (6 oz [168 g]) 250   Orange juice (with calcium, 8 oz [240 mL]) 300   Tofu with calcium (1/2 cup [113 g]) 435   Cheese (1 oz [28 g]) 195 to 335 (hard cheese = higher calcium)   Cottage cheese (1/2 cup [113 g]) 130   Ice cream or frozen yogurt (1/2 cup [113 g]) 100   Soy milk (8 oz [240 mL]) 300   Beans (1/2 cup cooked [113 g]) 60 to 80   Dark, leafy green vegetables (1/2 cup cooked [113 g]) 50 to 135   Almonds (24 whole) 70   Orange (1 medium) 60        Selected food sources of vitamin D   Food International units per serving   Cod liver oil, 1 tablespoon (15 mL) 1360   [place] (sockeye), cooked, 3 ounces (85 g) 794   Mushrooms that have been exposed to ultraviolet light to increase vitamin D, 3 ounces (85 g) (not yet commonly available) 400   Mackerel, cooked, 3 ounces (85 g) 388   Tuna fish, canned in water, drained, 3 ounces (85 g) 154   Milk, nonfat, reduced fat, and whole, vitamin D-fortified, 8 ounces (240 mL) 115 to 124   Orange juice fortified with vitamin D, 8 ounces (240 mL) (check product labels, as amount of added vitamin D varies) 100   Yogurt, fortified with 20 percent of the DV for vitamin D, 6 ounces (180 mL) (more heavily fortified yogurts provide more of the DV) 80   Margarine, fortified, 1 tablespoon (15 g) 60   Sardines, canned in oil, drained, 2 sardines 46   Liver, beef, cooked, 3.5 ounces (100 g) 46   Ready-to-eat cereal, fortified with 10 percent of the DV for vitamin D, 6 to 8 ounces (227 g) (more heavily fortified cereals might provide more of the DV) 40   Egg, 1 whole (vitamin D is found in yolk) 25   Cheese, Swiss, 1 ounce (29 g) 6      Elemental calcium and Vitamin D content per pill of different calcium supplements    Elemental Ca/tablet Ca compound Vitamin D   Caltrate 600 + D3 600 mg Carbonate 800 units   Caltrate 600 + D3 Soft Chews  600 mg  Carbonate  800 units    Caltrate Gummy Bites 250 mg  Tribasic calcium phosphate 400 units   Caltrate 600 + D3 Plus Minerals Chewables  600 mg Carbonate 800 units   Caltrate 600 + D3 Plus Minerals Minis 300 mg Carbonate 800 units   Citracal Petites  200 mg  Citrate 250 units   Citracal Maximum  315 mg Citrate 250 units   Citracal Plus Magnesium & Minerals 250 mg  Citrate 125 units   Citracal + D Slow Release  600 mg Citrate 500 units   Citracal Calcium Gummies 250 mg Tricalcium phosphate 500 units   Citracal Calcium Pearls  200 mg  Carbonate 500 units   OsCal Calcium + D3 500 mg Carbonate 200 units   Oscal Extra + D3 500 mg Carbonate 600 units   Oscal Ultra 600 mg Carbonate 500 units   Oscal Chewable  500  Carbonate 600 units   Tums 200 mg Carbonate -   Tums Extra Strength 300 mg Carbonate -   Tums Ultra Strength 400 mg Carbonate -   Tums Chewy Delights  400 mg  Carbonate -   Viactiv Calcium plus D + K 500 mg Carbonate 500 units  (or 1000 units in sugar-free)   Units: international units.               Diagnoses and all orders for this visit:    Acute sinus infection  -     Rapid Strep A Screen-Throat  -     azithromycin (ZITHROMAX) 250 MG tablet; Take 1 tablet (250 mg total) by mouth daily for 5 days. Take 500 mg (2 x 250 mg tablets) on day 1 followed by 250 mg (1 tablet) on days 2-5.  Dispense: 5 tablet; Refill: 1    Hypothyroidism  -     Thyroid Cascade    Fatigue  -     HM1(CBC and Differential)  -     HM1 (CBC with Diff)  -     docusate sodium (COLACE) 100 MG capsule; Take 1 capsule (100 mg total) by mouth daily as needed for constipation.  Dispense: 30 capsule; Refill: 2    Osteopenia  -     DXA Bone Density Scan; Future; Expected date: 1/4/17  -     Vitamin D, Total  (25-Hydroxy)              Perez Leroy MD  Internal medicine  [facility]  [phone]  [email or web address]      This is an electronically verified report by Perez Leroy M.D.  (Note created with Dragon voice recognition and unintended spelling errors and word substitutions may occur)

## 2021-06-08 NOTE — TELEPHONE ENCOUNTER
FYI - Status Update  Who is Calling: Patient  Update: Patient stated she is going out of town tonight and would like to pick the premarin up before she leaves.  Okay to leave a detailed message?:  No

## 2021-06-08 NOTE — PROGRESS NOTES
Patient consents to receive outdoor care: Yes    Upon arrival, patient instructed to proceed to designated location, place vehicle in park, turn off, and remove keys     If we are unable to safely and ergonomically able to provide care- is the patient able to safely able to get out of car and transfer to a chair? Yes    Na    Patient would like to receive their AVS NA.

## 2021-06-08 NOTE — TELEPHONE ENCOUNTER
Spoke with the patient and helped her to schedule a curbside B12 injection at the [facility] as requested.  Patient had no further questions at this time.  Theresa MERRITT CMA/ARTIE....................2:18 PM

## 2021-06-08 NOTE — PROGRESS NOTES
Luba Nguyen presents for her B-12injection. She is within the expected time frame for this and the injection was given without incident. See MAR for administration details. Katt Ramírez, CMA

## 2021-06-09 NOTE — PROGRESS NOTES
Luba Nguyen arrives in clinic today for her monthly B12 injection. Patient arrives within appropriate time frame of injection. Injection was given without incident, see MAR for administration.

## 2021-06-09 NOTE — PROGRESS NOTES
Luba Nguyen presents for her B-12 injection. She is within the expected time frame for this and the injection was given without incident. See MAR for administration details. Katt Ramírez, CMA

## 2021-06-09 NOTE — PROGRESS NOTES
Office Visit - Follow Up   Luba Nguyen   64 y.o. female    Date of Visit: 7/8/2020    Chief Complaint   Patient presents with     Pruritus     intermittent bilateral hands/feets x - 3-4 wks      Leg Pain     bilateral leg/calves tension - woken pt up at hs a few times         Assessment and Plan   1. Other polyneuropathy  She is describing a neuropathic type of process.  Check labs including sugars and B12 levels etc.  Follow-up with Dr. Thakur.  Low threshold for EMG/nerve conduction studies.  She does tell me she has been better since she has been watching her diet a little bit better.  Could consider some gabapentin if it continues to bother her.    2. Low vitamin B12 level  As above.  - HM2(CBC w/o Differential)  - Vitamin B12    3. Hyperglycemia  As above.  - Glycosylated Hemoglobin A1c    4. Essential hypertension  Blood pressure is high today.  I have asked that she follow her blood pressure once or twice a week between now and when she meets with Dr. Gamino.  - Comprehensive Metabolic Panel    5. Myalgia  I do not know what the feeling she had in her leg.  Sounds more like a cramp.  There is no palpable abnormality.  No swelling.  Reassurance today.  - CK Total    6. Hypothyroidism, unspecified type  She tells me she feels euthyroid and well on her current dosing.  Follow-up with Dr. Brennan planned.  - Thyroid Motley    7. Change in nail appearance  I do not know what to make of her nail changes.  She can investigate further with her dermatologist as needed.        Return in about 2 weeks (around 7/22/2020) for Recheck.     History of Present Illness   This 64 y.o. old this is a patient of Dr. Mathew's who comes in today with several concerns.  She is going to be establishing care with Dr. Gamino in the near future I believe.  She has a history of hypothyroidism.  She is feeling good on her current dose of levothyroxine she tells me.  She also has a remote history of gestational diabetes, family history  "of diabetes and B12 deficiency.  She notes that over the last month she has been having a itching sensation in the hands and feet.  No numbness or tingling per se but the itching is quite problematic for her.  She thought maybe was due to her sugars.  She started checking her sugars and they were running a little on the high side.  She also notes that her nails look different on her thumbs bilaterally.  She is worried about that.  She also noted that a couple instances she felt some discomfort in the back of her right leg.  She thought maybe there was a lump there but there was not a lump.  It was somewhat painful for her.  That is resolved however.    Review of Systems: A comprehensive review of systems was negative except as noted.     Medications, Allergies and Problem List   Reviewed, reconciled and updated  Post Discharge Medication Reconciliation Status:      Physical Exam   General Appearance:   Pleasant woman.  Blood pressure was confirmed a little high at 140/90.  Legs and feet appear normal.  Hands appear normal as well.  Normal circulation.    BP (!) 142/94   Pulse 78   Ht 5' 7.25\" (1.708 m)   Wt 176 lb (79.8 kg)   LMP  (LMP Unknown)   SpO2 98%   BMI 27.36 kg/m           Additional Information   Current Outpatient Medications   Medication Sig Dispense Refill     cyanocobalamin (VITAMIN B-12) 1,000 mcg/mL injection Inject 1 mL (1,000 mcg total) into the shoulder, thigh, or buttocks every 30 (thirty) days. 10 mL 1     ergocalciferol (ERGOCALCIFEROL) 1,250 mcg (50,000 unit) capsule Take once weekly 12 capsule 3     estrogens, conjugated, (PREMARIN) 0.45 MG tablet Take 1 tablet (0.45 mg total) by mouth daily. 90 tablet 0     LACTOBACILLUS RHAMNOSUS GG (CULTURELLE ORAL) Take by mouth.       lansoprazole (PREVACID) 30 MG capsule TAKE ONE CAPSULE BY MOUTH ONE TIME DAILY WITH BREAKFAST 90 capsule 3     multivitamin therapeutic tablet Take 1 tablet by mouth daily.       rosuvastatin (CRESTOR) 5 MG tablet " TAKE 1 TABLET BY MOUTH ON MONDAY, WEDNESDAY, AND FRIDAY 36 tablet 3     SYNTHROID 125 mcg tablet Take 1 tablet (125 mcg total) by mouth daily. 30 tablet 11     valACYclovir (VALTREX) 1000 MG tablet Take 1 tablet (1,000 mg total) by mouth 2 (two) times a day. X 1 day for cold sores 30 tablet 1     VIT A/VIT C/VIT E/ZINC/COPPER (ICAPS AREDS ORAL) Take by mouth.       ALPRAZolam (XANAX) 0.25 MG tablet Take 1 tablet (0.25 mg total) by mouth at bedtime as needed. 90 tablet 0     Current Facility-Administered Medications   Medication Dose Route Frequency Provider Last Rate Last Dose     cyanocobalamin injection 1,000 mcg  1,000 mcg Intramuscular Q30 Days Theodore Moctezuma MD   1,000 mcg at 06/15/20 0956     Allergies   Allergen Reactions     Other Drug Allergy (See Comments)      Pt states she is allergic to all narcotics      Percocet [Oxycodone-Acetaminophen] Itching     Statins-Hmg-Coa Reductase Inhibitors Other (See Comments)     GI UPSET       Sulfa (Sulfonamide Antibiotics)      Vicodin [Hydrocodone-Acetaminophen] Itching     Zetia [Ezetimibe] Myalgia     Dyazide [Triamterene-Hydrochlorothiazid] Rash     Lisinopril Rash     Prilosec [Omeprazole] Rash     Social History     Tobacco Use     Smoking status: Never Smoker     Smokeless tobacco: Never Used   Substance Use Topics     Alcohol use: Yes     Comment: Occasional     Drug use: Not on file       Review and/or order of clinical lab tests:  Review and/or order of radiology tests:  Review and/or order of medicine tests:  Discussion of test results with performing physician:  Decision to obtain old records and/or obtain history from someone other than the patient:  Review and summarization of old records and/or obtaining history from someone other than the patient and.or discussion of case with another health care provider:  Independent visualization of image, tracing or specimen itself:    Time: not applicable     Jose Martin Albrecht MD

## 2021-06-10 NOTE — PROGRESS NOTES
OFFICE VISIT NOTE         Assessment/Plan for  Luba Nguyen is a 61 y.o. female.  No Patient Care Coordination Note on file.       1.  Atrial tachyarrhythmia- SVT versus A. fib with aberrancy.  Rx aspirin Lopressor.  Rapid access clinic.  2.  Hypothyroidism.  On replacement.  Check TSH.  Last was slightly suppressed.  Old thyroid ×2 days  3.  Dyspnea probably due to arrhythmia.  I have checked a BNP and will order an echo.  She had a normal echo distant-7 years ago.  4.  Hyperlipidemia with 0 coronary calcium score at [facility] about 2 years ago    Plan:  Metoprolol 50 mg 1 daily  Aspirin 81 daily  Echocardiogram  Laboratory including BNP and TSH  Rapid access cardiology Monday or Tuesday  ER if worsens  Diagnoses and all orders for this visit:    Irregular heart beat  -     HM1(CBC and Differential)  -     Basic Metabolic Panel  -     Electrocardiogram Perform - Clinic  -     BNP(B-type Natriuretic Peptide)  -     HM1 (CBC with Diff)  -     Electrocardiogram Perform and Read  -     Ambulatory referral to [facility]  -     Echo Complete; Future; Expected date: 5/5/17  -     ALPRAZolam (XANAX) 0.25 MG tablet; Take 1 tablet (0.25 mg total) by mouth as needed for sleep or anxiety (PRN WHILE TRAVELING).  Dispense: 30 tablet; Refill: 0  -     aspirin 81 MG EC tablet; Take 1 tablet (81 mg total) by mouth daily.  Dispense: 150 tablet; Refill: 2  -     metoprolol succinate (TOPROL XL) 50 MG 24 hr tablet; Take 1 tablet (50 mg total) by mouth daily.  Dispense: 30 tablet; Refill: 11  -     Lipid Cascade    Hypothyroidism  -     Thyroid Cascade    Dyspnea  -     Cancel: XR Chest PA and Lateral; Future; Expected date: 5/5/17      This provider spent greater than 45 min. face-to-face time with the patient and/or his family.  More than half this time was spent in counseling and or coordination of care with other providers or agencies which were consistent with the nature of this patient's problems which are listed and  described in the assessment and plan.  Perez Leroy MD  Internal medicine  [facility]  [phone]    This is an electronically verified report by Perez Leroy M.D.  (Note created with Dragon voice recognition and unintended spelling errors and word substitutions may occur)         Subjective:   Chief Complaint:  Palpitations (heart palpitations for one week); Dizziness; and Shortness of Breath (blood pressure fluctuating   fasting)    New issue  Heart fluttering-significant  Noticed while hiking in [place] about 3 weeks ago  No souleymane dizziness.  No history of syncope.  Slight shortness of breath and fatigue  No PND orthopnea edema.  No chest pain  Mother has bradycardia and tachyarrhythmia with pacemaker  Father had congestive heart failure due to ischemic heart disease    Significantly on thyroid medication.  Last TSH slightly suppressed at 0.28.  Not on diet medication, stimulants etc.  Occasional alcohol.  No tobacco.      No recent illness.    Overall a very healthy person    PSFHx:: Past Medical History, Social History, and Family History reviewed and updated.  Medications and Allergies reviewed and reconciled.    Medications:  Current Outpatient Prescriptions   Medication Sig Dispense Refill     estrogens, conjugated, (PREMARIN) 0.45 MG tablet Take 1 tablet (0.45 mg total) by mouth daily. 90 tablet 3     lansoprazole (PREVACID) 30 MG capsule Take 30 mg by mouth daily with breakfast.       levothyroxine (SYNTHROID) 125 MCG tablet Take 1 tablet (125 mcg total) by mouth Daily at 6:00 am. (Patient taking differently: Take 125 mcg by mouth Daily at 6:00 am. ) 90 tablet 3     VITAMIN D2 50,000 unit capsule TAKE 1 CAPSULE BY MOUTH EVERY WEEK 26 capsule 2     ALPRAZolam (XANAX) 0.25 MG tablet Take 1 tablet (0.25 mg total) by mouth as needed for sleep or anxiety (PRN WHILE TRAVELING). 30 tablet 0     aspirin 81 MG EC tablet Take 1 tablet (81 mg total) by mouth daily. 150 tablet 2      cyanocobalamin (VITAMIN B-12) 1,000 mcg/mL injection Inject 1 mL (1,000 mcg total) into the shoulder, thigh, or buttocks every 30 (thirty) days. 10 mL 1     cyanocobalamin, vitamin B-12, 1,000 mcg/mL Kit Inject 1,000 mcg as directed every 7 days. 1 kit 1     docusate sodium (COLACE) 100 MG capsule Take 1 capsule (100 mg total) by mouth daily as needed for constipation. 30 capsule 2     ibuprofen (ADVIL,MOTRIN) 800 MG tablet Take 1 tablet (800 mg total) by mouth every 8 (eight) hours as needed for pain. 60 tablet 2     metoprolol succinate (TOPROL XL) 50 MG 24 hr tablet Take 1 tablet (50 mg total) by mouth daily. 30 tablet 11     Current Facility-Administered Medications   Medication Dose Route Frequency Provider Last Rate Last Dose     cyanocobalamin injection 1,000 mcg  1,000 mcg Intramuscular Q30 Days Theodore Moctezuma MD   1,000 mcg at 05/05/17 0810     Allergies:  Allergies   Allergen Reactions     Other Drug Allergy (See Comments)      Pt states she is allergic to all narcotics      Percocet [Oxycodone-Acetaminophen] Itching     Statins-Hmg-Coa Reductase Inhibitors Other (See Comments)     GI UPSET       Sulfa (Sulfonamide Antibiotics)      Vicodin [Hydrocodone-Acetaminophen] Itching     Zetia [Ezetimibe] Myalgia     Dyazide [Triamterene-Hydrochlorothiazid] Rash     Lisinopril Rash     Prilosec [Omeprazole] Rash     PSFHx: Tobacco Status:  She  reports that she has never smoked. She does not have any smokeless tobacco history on file.    Review of Systems:     Extensive 12-point review of systems was performed. Please see the HPI for problem specific pertinent review of systems.     Patient does note appetite is good bowels are regular.  She is tired and slightly short of breath    Otherwise, the following systems are noncontributory including constitutional, eyes, ears, nose and throat, cardiovascular, respiratory, gastrointestinal, genitourinary, musculoskeletal,neurological, skin and/or breast, endocrine,  "hematologic/lymph, allergic/immunologic and psychiatric.    .  .    Objective:    /82 (Patient Site: Right Arm, Patient Position: Sitting)  Pulse 76  Ht 5' 7.25\" (1.708 m)  Wt 175 lb (79.4 kg)  LMP  (LMP Unknown)  SpO2 99%  BMI 27.21 kg/m2  Weight:   Wt Readings from Last 3 Encounters:   05/05/17 175 lb (79.4 kg)   01/04/17 166 lb 1.3 oz (75.3 kg)   08/22/16 169 lb (76.7 kg)    pulse is regular with bursts of irregularity.  No pauses.  [unfilled]  175 lb (79.4 kg)  Color is excellent  In general, no acute distress.  Psych-affect appropriate for situation    Skin-unremarkable. No rash or petichae  Lymph-no lymphadenopathy  Eyes-sclera white,midline  ENT-normal  No thyromegaly  Clear lungs  Cardiac-pulse regular.  Runs of irregularity.  No pauses.  Slightly tachycardic at times up to 120  Abdomen soft  No edema  Good pulses  No tremor           I reviewed her cardiogram.  To me she appears to have      SVT with aberrancy or ventricular dysrhythmia.  It is wide.  LABS:   Recent Results (from the past 24 hour(s))   Electrocardiogram Perform - Clinic   Result Value Ref Range    SYSTOLIC BLOOD PRESSURE  mmHg    DIASTOLIC BLOOD PRESSURE  mmHg    VENTRICULAR RATE 99 BPM    ATRIAL RATE 99 BPM    P-R INTERVAL 160 ms    QRS DURATION 90 ms    Q-T INTERVAL 402 ms    QTC CALCULATION (BEZET) 515 ms    P Axis 73 degrees    R AXIS -21 degrees    T AXIS 84 degrees    MUSE DIAGNOSIS       Sinus rhythm with frequent and consecutive Premature ventricular complexes  Cannot rule out Anterior infarct , age undetermined  Prolonged QT  Abnormal ECG  When compared with ECG of 26-APR-2015 19:06,  Premature ventricular complexes are now Present     HM1 (CBC with Diff)   Result Value Ref Range    WBC 6.5 4.0 - 11.0 thou/uL    RBC 4.73 3.80 - 5.40 mill/uL    Hemoglobin 14.1 12.0 - 16.0 g/dL    Hematocrit 42.1 35.0 - 47.0 %    MCV 89 80 - 100 fL    MCH 29.8 27.0 - 34.0 pg    MCHC 33.5 32.0 - 36.0 g/dL    RDW 12.5 11.0 - 14.5 %    " Platelets 250 140 - 440 thou/uL    MPV 7.6 7.0 - 10.0 fL    Neutrophils % 61 50 - 70 %    Lymphocytes % 31 20 - 40 %    Monocytes % 5 2 - 10 %    Eosinophils % 3 0 - 6 %    Basophils % 1 0 - 2 %    Neutrophils Absolute 4.0 2.0 - 7.7 thou/uL    Lymphocytes Absolute 2.0 0.8 - 4.4 thou/uL    Monocytes Absolute 0.3 0.0 - 0.9 thou/uL    Eosinophils Absolute 0.2 0.0 - 0.4 thou/uL    Basophils Absolute 0.1 0.0 - 0.2 thou/uL     RADIOLOGY: No results found.    Reviewed coronary calcium score CT-0  Reviewed old echo-normal LV function normal valves

## 2021-06-10 NOTE — PROGRESS NOTES
OFFICE VISIT NOTE  Luba Nguyen   61 y.o. female            Assessment/Plan for  Luba Nguyen is a 61 y.o. female.  No Patient Care Coordination Note on file.       1. Atrial arrhythmia  Symptomatic atrial tachyarrhythmia.  On beta-blocker.  50 mg.  Limited by fatigue.  No evidence of ischemic heart disease.  Cardiologist noting this is SVT with aberrancy versus possible V. tach versus possibly A. fib.  I would like the patient to see EP-Dr. Bynum/Dr. Blandon etc. try to increase beta-blocker to at least 50/75 daily.  Continue aspirin.  - Ambulatory referral to Cardiology    2. Back pain  Vague.  Will evaluate further on RTC.  Check urinalysis.  Notify if worsens  - Urinalysis       Plan:  Urinalysis  Cardiology-BP  Increase metoprolol if able-75 daily.  100 mg daily was quite fatiguing.    There are no Patient Instructions on file for this visit.      Diagnoses and all orders for this visit:    Atrial arrhythmia  -     Ambulatory referral to Cardiology    Back pain  -     Urinalysis                Perez Leroy MD  Internal medicine  [facility]  [phone]  [email or web address]      This is an electronically verified report by Perez Leroy M.D.  (Note created with Dragon voice recognition and unintended spelling errors and word substitutions may occur)               Subjective:   Chief Complaint:  Irregular Heart Beat and Results    Underwent cardiology consult-reviewed  Underwent stress test-no evidence of ischemic heart disease.  Significant atrial arrhythmia post exercise-atrial fibrillation Dr. Andrés Mathews    Holter monitor ordered return results pending  Told by cardiology Dr. Hugo to increase beta-blocker twice daily.  Tried but fatigued remains on 50 mg.  She is not having chest pain.  The 50 mg dose does seem to adequately/remarkably decrease her symptomatology    Review of Systems:     Extensive 10-point review of systems was performed. Please see the  HPI for problem specific pertinent review of systems.     Patient does note otherwise feels well some anxiety    Otherwise, the following systems are noncontributory including constitutional, eyes, ears, nose and throat, cardiovascular, respiratory, gastrointestinal, genitourinary, musculoskeletal,neurological, skin and/or breast, endocrine, hematologic/lymph, allergic/immunologic and psychiatric.              Medications:  Current Outpatient Prescriptions   Medication Sig Dispense Refill     ALPRAZolam (XANAX) 0.25 MG tablet Take 1 tablet (0.25 mg total) by mouth as needed for sleep or anxiety (PRN WHILE TRAVELING). 30 tablet 0     aspirin 81 MG EC tablet Take 1 tablet (81 mg total) by mouth daily. 150 tablet 2     cyanocobalamin (VITAMIN B-12) 1,000 mcg/mL injection Inject 1 mL (1,000 mcg total) into the shoulder, thigh, or buttocks every 30 (thirty) days. 10 mL 1     docusate sodium (COLACE) 100 MG capsule Take 1 capsule (100 mg total) by mouth daily as needed for constipation. 30 capsule 2     estrogens, conjugated, (PREMARIN) 0.45 MG tablet Take 1 tablet (0.45 mg total) by mouth daily. 90 tablet 3     ibuprofen (ADVIL,MOTRIN) 800 MG tablet Take 1 tablet (800 mg total) by mouth every 8 (eight) hours as needed for pain. 60 tablet 2     LACTOBACILLUS RHAMNOSUS GG (CULTURELLE ORAL) Take by mouth.       lansoprazole (PREVACID) 30 MG capsule Take 30 mg by mouth daily with breakfast.       levothyroxine (SYNTHROID) 125 MCG tablet Take 1 tablet (125 mcg total) by mouth Daily at 6:00 am. (Patient taking differently: Take 125 mcg by mouth Daily at 6:00 am. ) 90 tablet 3     MELATONIN ORAL Take by mouth.       metoprolol succinate (TOPROL-XL) 50 MG 24 hr tablet Take 1 tablet (50 mg total) by mouth 2 (two) times a day. 60 tablet 11     VIT A/VIT C/VIT E/ZINC/COPPER (ICAPS AREDS ORAL) Take by mouth.       VITAMIN D2 50,000 unit capsule TAKE 1 CAPSULE BY MOUTH EVERY WEEK 26 capsule 2     cyanocobalamin, vitamin B-12, 1,000  mcg/mL Kit Inject 1,000 mcg as directed every 7 days. 1 kit 1     Current Facility-Administered Medications   Medication Dose Route Frequency Provider Last Rate Last Dose     cyanocobalamin injection 1,000 mcg  1,000 mcg Intramuscular Q30 Days Theodore Moctezuma MD   1,000 mcg at 05/05/17 0810     Current Outpatient Prescriptions on File Prior to Visit   Medication Sig     ALPRAZolam (XANAX) 0.25 MG tablet Take 1 tablet (0.25 mg total) by mouth as needed for sleep or anxiety (PRN WHILE TRAVELING).     aspirin 81 MG EC tablet Take 1 tablet (81 mg total) by mouth daily.     cyanocobalamin (VITAMIN B-12) 1,000 mcg/mL injection Inject 1 mL (1,000 mcg total) into the shoulder, thigh, or buttocks every 30 (thirty) days.     docusate sodium (COLACE) 100 MG capsule Take 1 capsule (100 mg total) by mouth daily as needed for constipation.     estrogens, conjugated, (PREMARIN) 0.45 MG tablet Take 1 tablet (0.45 mg total) by mouth daily.     ibuprofen (ADVIL,MOTRIN) 800 MG tablet Take 1 tablet (800 mg total) by mouth every 8 (eight) hours as needed for pain.     LACTOBACILLUS RHAMNOSUS GG (CULTURELLE ORAL) Take by mouth.     lansoprazole (PREVACID) 30 MG capsule Take 30 mg by mouth daily with breakfast.     levothyroxine (SYNTHROID) 125 MCG tablet Take 1 tablet (125 mcg total) by mouth Daily at 6:00 am. (Patient taking differently: Take 125 mcg by mouth Daily at 6:00 am. )     MELATONIN ORAL Take by mouth.     metoprolol succinate (TOPROL-XL) 50 MG 24 hr tablet Take 1 tablet (50 mg total) by mouth 2 (two) times a day.     VIT A/VIT C/VIT E/ZINC/COPPER (ICAPS AREDS ORAL) Take by mouth.     VITAMIN D2 50,000 unit capsule TAKE 1 CAPSULE BY MOUTH EVERY WEEK     cyanocobalamin, vitamin B-12, 1,000 mcg/mL Kit Inject 1,000 mcg as directed every 7 days.     Current Facility-Administered Medications on File Prior to Visit   Medication     cyanocobalamin injection 1,000 mcg       Allergies:  Allergies   Allergen Reactions     Other Drug  "Allergy (See Comments)      Pt states she is allergic to all narcotics      Percocet [Oxycodone-Acetaminophen] Itching     Statins-Hmg-Coa Reductase Inhibitors Other (See Comments)     GI UPSET       Sulfa (Sulfonamide Antibiotics)      Vicodin [Hydrocodone-Acetaminophen] Itching     Zetia [Ezetimibe] Myalgia     Dyazide [Triamterene-Hydrochlorothiazid] Rash     Lisinopril Rash     Prilosec [Omeprazole] Rash       PSFHx: Tobacco Status:  She  reports that she has never smoked. She does not have any smokeless tobacco history on file.   Alcohol Status:    History   Alcohol Use     Yes     Comment: Occasional       reports that she has never smoked. She does not have any smokeless tobacco history on file. She reports that she drinks alcohol. Her drug history is not on file.    Objective:    /72 (Patient Site: Right Arm, Patient Position: Sitting, Cuff Size: Adult Large)  Pulse 68  Ht 5' 7.25\" (1.708 m)  Wt 175 lb (79.4 kg)  LMP  (LMP Unknown)  SpO2 97%  Breastfeeding? No  BMI 27.21 kg/m2  Weight:   Wt Readings from Last 3 Encounters:   05/19/17 175 lb (79.4 kg)   05/08/17 173 lb (78.5 kg)   05/05/17 175 lb (79.4 kg)     BP Readings from Last 3 Encounters:   05/19/17 112/72   05/08/17 114/74   05/05/17 130/82         General-appears well, no acute distress.  Pulses regular.  Occasional premature beat.  Significantly improved from last visit    Reviewed stress test  Reviewed Dr. Hugo notes      Review of clinical lab tests  Lab Results   Component Value Date    WBC 6.5 05/05/2017    HGB 14.1 05/05/2017    HCT 42.1 05/05/2017     05/05/2017    CHOL 245 (H) 05/05/2017    TRIG 223 (H) 05/05/2017    HDL 53 05/05/2017     05/05/2017    K 4.2 05/05/2017     05/05/2017    CREATININE 0.67 05/05/2017    BUN 10 05/05/2017    CO2 25 05/05/2017    TSH 0.67 05/05/2017    HGBA1C 5.8 04/08/2016         No results found for this or any previous visit (from the past 24 hour(s)).    RADIOLOGY: No results " found.    Review of recent consultation-cardiology, nuclear stress

## 2021-06-12 NOTE — PROGRESS NOTES
OFFICE VISIT NOTE  Luba Nguyen   61 y.o. female            Assessment/Plan for  Luba Nguyen is a 61 y.o. female.  No Patient Care Coordination Note on file.           1.  SVT-symptomatically controlled with 50 mg metoprolol daily.  No A. fib.  Has cardiology opwksi-rn-GyDr. Granda in September.  I do not think she needs ablation or EP-she is well controlled.  While she might need this in the future possibly.  Consider Holter monitoring per cardiology-repeat  2.  Postmenopausal on hormones-discussion  3.  Malodorous urine-rule out UTI    4.  Hypothyroidism-clinically euthyroid with recent TSH.    Plan:  Discussion  Routine lab  Medication refills  Check urinalysis  Clinic visit 3 months  Would not use antihistamines, sympathomimetics, excessive caffeine, alcohol.    There are no Patient Instructions on file for this visit.    Diagnoses and all orders for this visit:    SVT (supraventricular tachycardia)    Hypothyroidism  -     levothyroxine (SYNTHROID) 125 MCG tablet; Take 1 tablet (125 mcg total) by mouth Daily at 6:00 am.  Dispense: 90 tablet; Refill: 3    Atrial tachycardia determined by electrocardiography  -     metoprolol succinate (TOPROL-XL) 50 MG 24 hr tablet; Take 1 tablet (50 mg total) by mouth daily.  Dispense: 180 tablet; Refill: 3    Irregular heart beat  -     ALPRAZolam (XANAX) 0.25 MG tablet; Take 1 tablet (0.25 mg total) by mouth as needed for sleep or anxiety (PRN WHILE TRAVELING).  Dispense: 30 tablet; Refill: 1    GERD (gastroesophageal reflux disease)  -     lansoprazole (PREVACID) 30 MG capsule; Take 1 capsule (30 mg total) by mouth daily with breakfast.  Dispense: 90 capsule; Refill: 3    Postmenopausal  -     estrogens, conjugated, (PREMARIN) 0.45 MG tablet; Take 1 tablet (0.45 mg total) by mouth daily.  Dispense: 90 tablet; Refill: 3  -     ergocalciferol (ERGOCALCIFEROL) 50,000 unit capsule; TAKE 1 CAPSULE BY MOUTH EVERY WEEK  Dispense: 26 capsule; Refill: 0    Malodorous  urine  -     Urinalysis-UC if Indicated        Medications after visit  Current Outpatient Prescriptions   Medication Sig Dispense Refill     ALPRAZolam (XANAX) 0.25 MG tablet Take 1 tablet (0.25 mg total) by mouth as needed for sleep or anxiety (PRN WHILE TRAVELING). 30 tablet 1     aspirin 81 MG EC tablet Take 1 tablet (81 mg total) by mouth daily. 150 tablet 2     cyanocobalamin (VITAMIN B-12) 1,000 mcg/mL injection Inject 1 mL (1,000 mcg total) into the shoulder, thigh, or buttocks every 30 (thirty) days. 10 mL 1     docusate sodium (COLACE) 100 MG capsule Take 1 capsule (100 mg total) by mouth 2 (two) times a day. 60 capsule 2     ergocalciferol (ERGOCALCIFEROL) 50,000 unit capsule TAKE 1 CAPSULE BY MOUTH EVERY WEEK 26 capsule 0     estrogens, conjugated, (PREMARIN) 0.45 MG tablet Take 1 tablet (0.45 mg total) by mouth daily. 90 tablet 3     LACTOBACILLUS RHAMNOSUS GG (CULTURELLE ORAL) Take by mouth.       lansoprazole (PREVACID) 30 MG capsule Take 1 capsule (30 mg total) by mouth daily with breakfast. 90 capsule 3     levothyroxine (SYNTHROID) 125 MCG tablet Take 1 tablet (125 mcg total) by mouth Daily at 6:00 am. 90 tablet 3     MELATONIN ORAL Take by mouth.       metoprolol succinate (TOPROL-XL) 50 MG 24 hr tablet Take 1 tablet (50 mg total) by mouth daily. 180 tablet 3     VIT A/VIT C/VIT E/ZINC/COPPER (ICAPS AREDS ORAL) Take by mouth.       Current Facility-Administered Medications   Medication Dose Route Frequency Provider Last Rate Last Dose     cyanocobalamin injection 1,000 mcg  1,000 mcg Intramuscular Q30 Days Theodore Moctezuma MD   1,000 mcg at 08/09/17 1317           This provider spent greater than 25 min. face-to-face time with the patient and/or his family.  More than half this time was spent in counseling and or coordination of care with other providers or agencies which were consistent with the nature of this patient's problems which are listed and described in the assessment and plan.            Perez Leroy MD  Internal medicine  [facility]  [phone]  [email or web address]      This is an electronically verified report by Perez Leroy M.D.  (Note created with Dragon voice recognition and unintended spelling errors and word substitutions may occur)               Subjective:   Chief Complaint:  Atrial Arrhythmia; Follow-up; Hypertension; and Medication Refill    In for follow-up    Reviewed course.  Patient better on 50 mg of Toprol    Much better overall with treatment of strep throat-Dr. Albrecht-reviewed    Full clindamycin caused ECG issues.  Not taking  Patient is felt to have a SVT and not in A. fib.  Low risk thrombosis/thromboembolism.  Is on aspirin.    Does have malodorous urine    Review of Systems:     Extensive 10-point review of systems was performed. Please see the HPI for problem specific pertinent review of systems.     Patient does note questions about hormones-does see GYN.  No voice no motor instability which she does not need at this time    Otherwise, the following systems are noncontributory including constitutional, eyes, ears, nose and throat, cardiovascular, respiratory, gastrointestinal, genitourinary, musculoskeletal,neurological, skin and/or breast, endocrine, hematologic/lymph, allergic/immunologic and psychiatric.              Medications:  Current Outpatient Prescriptions on File Prior to Visit   Medication Sig     aspirin 81 MG EC tablet Take 1 tablet (81 mg total) by mouth daily.     cyanocobalamin (VITAMIN B-12) 1,000 mcg/mL injection Inject 1 mL (1,000 mcg total) into the shoulder, thigh, or buttocks every 30 (thirty) days.     docusate sodium (COLACE) 100 MG capsule Take 1 capsule (100 mg total) by mouth 2 (two) times a day.     LACTOBACILLUS RHAMNOSUS GG (CULTURELLE ORAL) Take by mouth.     MELATONIN ORAL Take by mouth.     VIT A/VIT C/VIT E/ZINC/COPPER (ICAPS AREDS ORAL) Take by mouth.     [DISCONTINUED] ALPRAZolam (XANAX)  0.25 MG tablet Take 1 tablet (0.25 mg total) by mouth as needed for sleep or anxiety (PRN WHILE TRAVELING).     [DISCONTINUED] ergocalciferol (ERGOCALCIFEROL) 50,000 unit capsule TAKE 1 CAPSULE BY MOUTH EVERY WEEK     [DISCONTINUED] estrogens, conjugated, (PREMARIN) 0.45 MG tablet Take 1 tablet (0.45 mg total) by mouth daily.     [DISCONTINUED] lansoprazole (PREVACID) 30 MG capsule Take 1 capsule (30 mg total) by mouth daily with breakfast.     [DISCONTINUED] levothyroxine (SYNTHROID) 125 MCG tablet Take 1 tablet (125 mcg total) by mouth Daily at 6:00 am. (Patient taking differently: Take 125 mcg by mouth Daily at 6:00 am. )     [DISCONTINUED] metoprolol succinate (TOPROL-XL) 50 MG 24 hr tablet Take 0.5 tablets (25 mg total) by mouth 2 (two) times a day.     [DISCONTINUED] SYNTHROID 125 mcg tablet TAKE 1 TABLET BY MOUTH DAILY     [DISCONTINUED] diltiazem (CARDIZEM) 60 MG tablet Take 60 mg by mouth.     Current Facility-Administered Medications on File Prior to Visit   Medication     cyanocobalamin injection 1,000 mcg            Allergies:  Allergies   Allergen Reactions     Other Drug Allergy (See Comments)      Pt states she is allergic to all narcotics      Percocet [Oxycodone-Acetaminophen] Itching     Statins-Hmg-Coa Reductase Inhibitors Other (See Comments)     GI UPSET       Sulfa (Sulfonamide Antibiotics)      Vicodin [Hydrocodone-Acetaminophen] Itching     Zetia [Ezetimibe] Myalgia     Dyazide [Triamterene-Hydrochlorothiazid] Rash     Lisinopril Rash     Prilosec [Omeprazole] Rash       PSFHx: Tobacco Status:  She  reports that she has never smoked. She has never used smokeless tobacco.   Alcohol Status:    History   Alcohol Use     Yes     Comment: Occasional       reports that she has never smoked. She has never used smokeless tobacco. She reports that she drinks alcohol. Her drug history is not on file.    Objective:    /62 (Patient Site: Left Arm, Patient Position: Sitting, Cuff Size: Adult  "Regular)  Pulse 76  Ht 5' 7.25\" (1.708 m)  Wt 175 lb 1.3 oz (79.4 kg)  LMP  (LMP Unknown)  SpO2 99%  Breastfeeding? No  BMI 27.22 kg/m2  Weight:   Wt Readings from Last 3 Encounters:   09/01/17 175 lb 1.3 oz (79.4 kg)   08/10/17 176 lb (79.8 kg)   06/19/17 171 lb 0.6 oz (77.6 kg)     BP Readings from Last 3 Encounters:   09/01/17 114/62   08/10/17 110/58   06/19/17 112/70         General-appears well, no acute distress.  Pulse regular throughout some premature beats.  Cardiac regular otherwise.  No murmur  No edema  Appears well      Review of clinical lab tests  Lab Results   Component Value Date    WBC 6.5 05/05/2017    HGB 14.1 05/05/2017    HCT 42.1 05/05/2017     05/05/2017    CHOL 245 (H) 05/05/2017    TRIG 223 (H) 05/05/2017    HDL 53 05/05/2017     05/05/2017    K 4.2 05/05/2017     05/05/2017    CREATININE 0.67 05/05/2017    BUN 10 05/05/2017    CO2 25 05/05/2017    TSH 0.67 05/05/2017    HGBA1C 5.8 04/08/2016       Glucose   Date/Time Value Ref Range Status   05/05/2017 09:05  70 - 125 mg/dL Final   04/08/2016 08:53  74 - 125 mg/dL Final   04/26/2015 07:40  (H) 70 - 125 mg/dL Final     Recent Results (from the past 24 hour(s))   Urinalysis-UC if Indicated   Result Value Ref Range    Color, UA Yellow Colorless, Yellow, Straw, Light Yellow    Clarity, UA Clear Clear    Glucose, UA Negative Negative    Bilirubin, UA Negative Negative    Ketones, UA Negative Negative    Specific Gravity, UA 1.010 1.005 - 1.030    Blood, UA Negative Negative    pH, UA 6.0 5.0 - 8.0    Protein, UA Negative Negative mg/dL    Urobilinogen, UA 0.2 E.U./dL 0.2 E.U./dL, 1.0 E.U./dL    Nitrite, UA Negative Negative    Leukocytes, UA Negative Negative       RADIOLOGY: No results found.    Review of recent consultation-       SUMMARY   Ms. Nguyen is a pleasant 61-year-old female with paroxysmal atrial dysrhythmias. These are at times described as atrial tachycardia and other times as " paroxysmal atrial fibrillation. It is unclear to me at which without the full tracings for review. We are going to try to obtain those. In the absence of those I did, however, discuss with her in detail that the description is that of atrial tachycardia. Even if she had atrial fibrillation, this is paroxysmal, brief episodes, and her CHADS score would be 0, CHADS-VASc could be 1 only for being female. Given that, I think her stroke risk is quite low, even if this is atrial fibrillation.     We discussed further options. She reports her father having done well on atenolol and wonders if that might do better than metoprolol for her, as I think Dr. Leroy took care of her father as well, and stated they have some similarities with their medication intolerances. In the short-term, then, we did elect to try making change, to 50 a day of atenolol, to see if she does better with that. If not, she is going to give us a call. I discussed with her her drug options. I think the easiest would be a 1c agent like flecainide, and would consider starting her on 50 b.i.d. of that. If she is doing well after a couple of weeks without significant side effects I would want to do a stress test to see if we are indeed fully suppressing things. If that is the case, she certainly could continue on that, and we might even be able to discontinue atenolol down the road. I would want it on early on, in case she is having any rapid rates or for risk of potential regularizing fibrillation to flutter, which could go one-to-one without a beta blocker on board.     Finally, however, I did discuss with her somewhat the potential for ablation and the fact that this was probably a good long-term option for her. At this point, however, whether this would be an atrial tachycardia ablation charting a specific focus with limited during the procedure versus an atrial fibrillation ablation with pulmonary vein isolation and general anesthesia, the procedure  depends upon my being able see more of the tracings. We will therefore try to obtain these. I have asked her to call us with any concerns in the meantime, but would like to see her back in a couple of months to see how she is doing it, either on the atenolol, or at that time we have tried the flecainide as well. I also briefly discussed the long-term possibility of AV node ablation and pacing, but stated at her young age this was certainly not an option that we would want to move to unless we failed everything else.     Thank you for allowing me to participate in her care. I plan to see her back in a couple of months to reevaluate.     NUNU GRANDA MD       Telephone Encounter - Ryann Randle RN - 08/18/2017 8:46 AM CDT  Patient was contacted cancel stress test, Flecainide stopped 8/11. Patient will follow up with Dr. Granda 9/26 as scheduled. RYANN RANDLE RN .................... 8/18/2017 8:47 AM      Back to top of Miscellaneous Notes  Telephone Encounter - Robbie Granda MD - 08/17/2017 9:28 PM CDT  The stress test is to be done on flecainide. Standard treadmill stress is adequate. If she is not on flecainide, she does not need the stress test. I can't tell from the chart if she is taking it or not.  Robbie Granda MD     Back to top of Miscellaneous Notes  Telephone Encounter - Ryann Randle RN - 08/11/2017 12:13 PM CDT  Patient was contacted, updated on Dr. Granda review of Holter, patient is in agreement with EPS and ablation, but she would like to wait until after her daughters wedding this winter. Will update Dr. Granda. Patient also asking if she still needs upcomming stress test 8/22, will review with Dr. Granda. RYANN RANDLE RN .................... 8/11/2017 12:19 PM      Back to top of Miscellaneous Notes  Telephone Encounter - Ryann Randle RN - 08/11/2017 12:11 PM CDT  ----- Message from Robbie Granda MD sent at 8/8/2017 3:39 PM  CDT -----  Regarding: RE: holter and stress echo  Thanks. Most looks like an AT. I think we should set her up for an electrophysiology study and possible supraventricular tachycardia (AT?) ablation. I don't see enough to consider AF ablation at this point.  Robbie Granda MD   ----- Message -----

## 2021-06-12 NOTE — PROGRESS NOTES
"  Office Visit - Follow Up   Luba Nguyen   61 y.o. female    Date of Visit: 8/10/2017    Chief Complaint   Patient presents with     Sore Throat     grand daughter dx with strep - woke up with aches, sore thoat, and felt warm.         Assessment and Plan   1. Strep pharyngitis  Rapid strep was positive.  Will treat with Penicillin  4 times daily for 10 days.  She is to take the entire 10 day course.  We discussed measures at prevention.  - Rapid Strep A Screen-Throat        Return if symptoms worsen or fail to improve.     History of Present Illness   This 61 y.o. old patient with history of cardiac arrhythmias comes in today as an urgent evaluation for sweats feverish feeling neck discomfort and sore throat.  Granddaughter whom she was recently babysitting for a week was recently diagnosed with strep.  She is concerned.  They are leaving town today for [place].  Denies other somatic concerns.    Review of Systems: A comprehensive review of systems was negative except as noted.     Medications, Allergies and Problem List   Reviewed and updated     Physical Exam   General Appearance:   Pleasant woman in no distress.  Does not appear toxic.  HEENT is unremarkable.  Oropharynx is with erythema but no exudate.  Neck is supple with no adenopathy noted.  Lungs are clear.    /58 (Patient Site: Right Arm, Patient Position: Sitting, Cuff Size: Adult Regular)  Pulse 62  Temp 97.5  F (36.4  C)  Ht 5' 7.25\" (1.708 m)  Wt 176 lb (79.8 kg)  LMP  (LMP Unknown)  SpO2 98%  BMI 27.36 kg/m2         Additional Information   Current Outpatient Prescriptions   Medication Sig Dispense Refill     ALPRAZolam (XANAX) 0.25 MG tablet Take 1 tablet (0.25 mg total) by mouth as needed for sleep or anxiety (PRN WHILE TRAVELING). 30 tablet 0     aspirin 81 MG EC tablet Take 1 tablet (81 mg total) by mouth daily. 150 tablet 2     cyanocobalamin (VITAMIN B-12) 1,000 mcg/mL injection Inject 1 mL (1,000 mcg total) into the " shoulder, thigh, or buttocks every 30 (thirty) days. 10 mL 1     diltiazem (CARDIZEM) 60 MG tablet Take 60 mg by mouth.       docusate sodium (COLACE) 100 MG capsule Take 1 capsule (100 mg total) by mouth 2 (two) times a day. 60 capsule 2     ergocalciferol (ERGOCALCIFEROL) 50,000 unit capsule TAKE 1 CAPSULE BY MOUTH EVERY WEEK 26 capsule 2     estrogens, conjugated, (PREMARIN) 0.45 MG tablet Take 1 tablet (0.45 mg total) by mouth daily. 90 tablet 3     ibuprofen (ADVIL,MOTRIN) 800 MG tablet Take 1 tablet (800 mg total) by mouth every 8 (eight) hours as needed for pain. 60 tablet 2     LACTOBACILLUS RHAMNOSUS GG (CULTURELLE ORAL) Take by mouth.       lansoprazole (PREVACID) 30 MG capsule Take 1 capsule (30 mg total) by mouth daily with breakfast. 90 capsule 3     levothyroxine (SYNTHROID) 125 MCG tablet Take 1 tablet (125 mcg total) by mouth Daily at 6:00 am. (Patient taking differently: Take 125 mcg by mouth Daily at 6:00 am. ) 90 tablet 3     MELATONIN ORAL Take by mouth.       metoprolol succinate (TOPROL-XL) 50 MG 24 hr tablet Take 0.5 tablets (25 mg total) by mouth 2 (two) times a day. 60 tablet 11     SYNTHROID 125 mcg tablet TAKE 1 TABLET BY MOUTH DAILY 28 tablet PRN     VIT A/VIT C/VIT E/ZINC/COPPER (ICAPS AREDS ORAL) Take by mouth.       cyanocobalamin, vitamin B-12, 1,000 mcg/mL Kit Inject 1,000 mcg as directed every 7 days. 1 kit 1     penicillin VK (PEN VK) 500 MG tablet Take 1 tablet (500 mg total) by mouth 4 (four) times a day for 10 days. 40 tablet 0     Current Facility-Administered Medications   Medication Dose Route Frequency Provider Last Rate Last Dose     cyanocobalamin injection 1,000 mcg  1,000 mcg Intramuscular Q30 Days Theodore Moctezuma MD   1,000 mcg at 08/09/17 1317     Allergies   Allergen Reactions     Other Drug Allergy (See Comments)      Pt states she is allergic to all narcotics      Percocet [Oxycodone-Acetaminophen] Itching     Statins-Hmg-Coa Reductase Inhibitors Other (See  Comments)     GI UPSET       Sulfa (Sulfonamide Antibiotics)      Vicodin [Hydrocodone-Acetaminophen] Itching     Zetia [Ezetimibe] Myalgia     Dyazide [Triamterene-Hydrochlorothiazid] Rash     Lisinopril Rash     Prilosec [Omeprazole] Rash     Social History   Substance Use Topics     Smoking status: Never Smoker     Smokeless tobacco: Never Used     Alcohol use Yes      Comment: Occasional       Review and/or order of clinical lab tests:  Review and/or order of radiology tests:  Review and/or order of medicine tests:  Discussion of test results with performing physician:  Decision to obtain old records and/or obtain history from someone other than the patient:  Review and summarization of old records and/or obtaining history from someone other than the patient and.or discussion of case with another health care provider:  Independent visualization of image, tracing or specimen itself:    Time: total time spent with the patient was 15 minutes of which >50% was spent in counseling and coordination of care     Jose Martin Albrecht MD

## 2021-06-13 NOTE — PROGRESS NOTES
Dawna came in today for her flu shot and her B-12  Nell Handley CMA ([facility])

## 2021-06-14 NOTE — PROGRESS NOTES
Luba is here today for her B12 injection. She is within the expected time frame. Injection given without incident. See MAR for administration details.   Teresa Mcgarry CSS

## 2021-06-14 NOTE — PROGRESS NOTES
Lab Results   Component Value Date    CHOL 288 (H) 12/01/2017    CHOL 245 (H) 05/05/2017    CHOL 274 (H) 04/08/2016     Lab Results   Component Value Date    HDL 60 12/01/2017    HDL 53 05/05/2017    HDL 58 04/08/2016     Lab Results   Component Value Date    LDLCALC 197 (H) 12/01/2017    LDLCALC 147 (H) 05/05/2017    LDLCALC 178 (H) 04/08/2016     Lab Results   Component Value Date    TRIG 156 (H) 12/01/2017    TRIG 223 (H) 05/05/2017    TRIG 190 (H) 04/08/2016     No components found for: CHOLHDL  Rx Crestor 5 mg Monday Wednesday Friday

## 2021-06-14 NOTE — PROGRESS NOTES
OFFICE VISIT NOTE  Luba Nguyen   62 y.o. female            Assessment/Plan for  Luba Nguyen is a 62 y.o. female.  No Patient Care Coordination Note on file.       There are no diagnoses linked to this encounter.   1.  Atrial tachycardia.  Continue metoprolol.  Doing well with this.  2.  Hyperlipidemia.  Significant statin intolerance.  Coronary calcium score negative  3.  Chronic insomnia with some anxiety component.  At bedtime Xanax as needed  4.  Poor sleep, fatigue.  Needs sleep study.  Particularly with her arrhythmia.    Plan:  Sleep study  Medication refill  Continue metoprolol  At bedtime Xanax as needed  Clinic visit 3 months    There are no Patient Instructions on file for this visit.    Diagnoses and all orders for this visit:    Daytime sleepiness  -     Ambulatory referral to Sleep Medicine    SVT (supraventricular tachycardia)    Irregular heart beat  -     ALPRAZolam (XANAX) 0.25 MG tablet; Take 1 tablet (0.25 mg total) by mouth as needed for sleep or anxiety (PRN WHILE TRAVELING).  Dispense: 90 tablet; Refill: 1    Insomnia  -     ALPRAZolam (XANAX) 0.25 MG tablet; Take 1 tablet (0.25 mg total) by mouth as needed for sleep or anxiety (PRN WHILE TRAVELING).  Dispense: 90 tablet; Refill: 1    Hyperlipidemia  -     Lipid Cascade      Medications after visit  Current Outpatient Prescriptions   Medication Sig Dispense Refill     ALPRAZolam (XANAX) 0.25 MG tablet Take 1 tablet (0.25 mg total) by mouth as needed for sleep or anxiety (PRN WHILE TRAVELING). 90 tablet 1     aspirin 81 MG EC tablet Take 1 tablet (81 mg total) by mouth daily. 150 tablet 2     cyanocobalamin (VITAMIN B-12) 1,000 mcg/mL injection Inject 1 mL (1,000 mcg total) into the shoulder, thigh, or buttocks every 30 (thirty) days. 10 mL 1     docusate sodium (COLACE) 100 MG capsule Take 1 capsule (100 mg total) by mouth 2 (two) times a day. 60 capsule 2     ergocalciferol (ERGOCALCIFEROL) 50,000 unit capsule TAKE 1 CAPSULE BY  MOUTH EVERY WEEK 26 capsule 0     estrogens, conjugated, (PREMARIN) 0.45 MG tablet Take 1 tablet (0.45 mg total) by mouth daily. 90 tablet 3     LACTOBACILLUS RHAMNOSUS GG (CULTURELLE ORAL) Take by mouth.       lansoprazole (PREVACID) 30 MG capsule Take 1 capsule (30 mg total) by mouth daily with breakfast. 90 capsule 3     levothyroxine (SYNTHROID) 125 MCG tablet Take 1 tablet (125 mcg total) by mouth Daily at 6:00 am. 90 tablet 3     MELATONIN ORAL Take by mouth.       metoprolol succinate (TOPROL-XL) 50 MG 24 hr tablet Take 1 tablet (50 mg total) by mouth daily. 180 tablet 3     VIT A/VIT C/VIT E/ZINC/COPPER (ICAPS AREDS ORAL) Take by mouth.       Current Facility-Administered Medications   Medication Dose Route Frequency Provider Last Rate Last Dose     cyanocobalamin injection 1,000 mcg  1,000 mcg Intramuscular Q30 Days Theodore Moctezuma MD   1,000 mcg at 11/08/17 1048             This provider spent greater than 25 min. face-to-face time with the patient and/or his family.  More than half this time was spent in counseling and or coordination of care with other providers or agencies which were consistent with the nature of this patient's problems which are listed and described in the assessment and plan.        Perez Leroy MD  Internal medicine  [facility]  [phone]  [email or web address]      This is an electronically verified report by Perez Leroy M.D.  (Note created with Dragon voice recognition and unintended spelling errors and word substitutions may occur)               Subjective:   Chief Complaint:  Hypertension and Follow-up    Doing well  Occasional palpitations  Not bothersome  Tolerating beta blockers with some fatigue  No serious arrhythmia    Not interested in ablation    Patient does have some anxiety.  She does take Xanax at night to help her sleep as well.  This works well.  We discussed habituation.  I do not like Ambien on chronic basis and someone  her age.    Her daughter is getting  in about a month.  Very excited.    GERD is been well controlled with the Prevacid.    Review of Systems:     Extensive 10-point review of systems was performed. Please see the HPI for problem specific pertinent review of systems.     Patient does note her appetite is good.  Her bowels are regular  Her  notes she snores.  She has daytime sleepiness she does not sleep well she does have arrhythmia.  She has never had a sleep study.  Her  recently goes to sleep apnea    Otherwise, the following systems are noncontributory including constitutional, eyes, ears, nose and throat, cardiovascular, respiratory, gastrointestinal, genitourinary, musculoskeletal,neurological, skin and/or breast, endocrine, hematologic/lymph, allergic/immunologic and psychiatric.              Medications:  Current Outpatient Prescriptions on File Prior to Visit   Medication Sig     aspirin 81 MG EC tablet Take 1 tablet (81 mg total) by mouth daily.     cyanocobalamin (VITAMIN B-12) 1,000 mcg/mL injection Inject 1 mL (1,000 mcg total) into the shoulder, thigh, or buttocks every 30 (thirty) days.     docusate sodium (COLACE) 100 MG capsule Take 1 capsule (100 mg total) by mouth 2 (two) times a day.     ergocalciferol (ERGOCALCIFEROL) 50,000 unit capsule TAKE 1 CAPSULE BY MOUTH EVERY WEEK     estrogens, conjugated, (PREMARIN) 0.45 MG tablet Take 1 tablet (0.45 mg total) by mouth daily.     LACTOBACILLUS RHAMNOSUS GG (CULTURELLE ORAL) Take by mouth.     lansoprazole (PREVACID) 30 MG capsule Take 1 capsule (30 mg total) by mouth daily with breakfast.     levothyroxine (SYNTHROID) 125 MCG tablet Take 1 tablet (125 mcg total) by mouth Daily at 6:00 am.     MELATONIN ORAL Take by mouth.     metoprolol succinate (TOPROL-XL) 50 MG 24 hr tablet Take 1 tablet (50 mg total) by mouth daily.     VIT A/VIT C/VIT E/ZINC/COPPER (ICAPS AREDS ORAL) Take by mouth.     [DISCONTINUED] ALPRAZolam (XANAX) 0.25 MG  "tablet Take 1 tablet (0.25 mg total) by mouth as needed for sleep or anxiety (PRN WHILE TRAVELING).     Current Facility-Administered Medications on File Prior to Visit   Medication     cyanocobalamin injection 1,000 mcg            Allergies:  Allergies   Allergen Reactions     Other Drug Allergy (See Comments)      Pt states she is allergic to all narcotics      Percocet [Oxycodone-Acetaminophen] Itching     Statins-Hmg-Coa Reductase Inhibitors Other (See Comments)     GI UPSET       Sulfa (Sulfonamide Antibiotics)      Vicodin [Hydrocodone-Acetaminophen] Itching     Zetia [Ezetimibe] Myalgia     Dyazide [Triamterene-Hydrochlorothiazid] Rash     Lisinopril Rash     Prilosec [Omeprazole] Rash       PSFHx: Tobacco Status:  She  reports that she has never smoked. She has never used smokeless tobacco.   Alcohol Status:    History   Alcohol Use     Yes     Comment: Occasional       reports that she has never smoked. She has never used smokeless tobacco. She reports that she drinks alcohol. Her drug history is not on file.    Objective:    /84  Pulse 72  Ht 5' 7.25\" (1.708 m)  Wt 172 lb 1.3 oz (78.1 kg)  LMP  (LMP Unknown)  SpO2 99%  Breastfeeding? No  BMI 26.75 kg/m2  Weight:   Wt Readings from Last 3 Encounters:   12/01/17 172 lb 1.3 oz (78.1 kg)   09/01/17 175 lb 1.3 oz (79.4 kg)   08/10/17 176 lb (79.8 kg)     BP Readings from Last 3 Encounters:   12/01/17 138/84   09/01/17 114/62   08/10/17 110/58         General-appears well, no acute distress.  Pulses regular throughout  Lungs clear  No murmur  No edema      Review of clinical lab tests  Lab Results   Component Value Date    WBC 6.5 05/05/2017    HGB 14.1 05/05/2017    HCT 42.1 05/05/2017     05/05/2017    CHOL 288 (H) 12/01/2017    TRIG 156 (H) 12/01/2017    HDL 60 12/01/2017     05/05/2017    K 4.2 05/05/2017     05/05/2017    CREATININE 0.67 05/05/2017    BUN 10 05/05/2017    CO2 25 05/05/2017    TSH 0.67 05/05/2017    HGBA1C 5.8 " 04/08/2016     Lab Results   Component Value Date    LDLCALC 197 (H) 12/01/2017       Glucose   Date/Time Value Ref Range Status   05/05/2017 09:05  70 - 125 mg/dL Final   04/08/2016 08:53  74 - 125 mg/dL Final   04/26/2015 07:40  (H) 70 - 125 mg/dL Final     Recent Results (from the past 24 hour(s))   Lipid Cascade   Result Value Ref Range    Cholesterol 288 (H) <=199 mg/dL    Triglycerides 156 (H) <=149 mg/dL    HDL Cholesterol 60 >=50 mg/dL    LDL Calculated 197 (H) <=129 mg/dL    Patient Fasting > 8hrs? Yes        RADIOLOGY: No results found.    Review of recent consultation-Cardiology        I did review her Holter monitor with her. We also discussed further treatment options. She clearly has intermittent episodes of atrial tachycardia. I told her this would be an SVT type of ablation as opposed to an atrial fibrillation ablation. She did wonder about thyroid, because she feels if anything more hypothyroid. I told her this was probably more a beta-blocker effect, but we will check a TSH and free T4. She did also ask about cardiovascular risks. I told her given her negative CT scan and we calculated a 10-year [place] risk score of only 7%, that she did not really need to be overly concerned about drug therapy for her hyperlipidemia and should continue with the diet and exercise.    I did ask her to see me back in about 6 months, at which point we will reevaluate. I think she would be an excellent candidate for ablation if and when she would have symptoms enough to be wishing to proceed.    Thank you for allowing me to participate in her care.    MD DAVID PAUL/negrita  D:09/26/2017 20:37:48  T:09/27/2017 09:56:17  VJID: [id number]  TJID: [id number]

## 2021-06-15 PROBLEM — R00.2 HEART PALPITATIONS: Status: ACTIVE | Noted: 2017-05-08

## 2021-06-15 PROBLEM — I20.9 ISCHEMIC CHEST PAIN (H): Status: ACTIVE | Noted: 2017-05-08

## 2021-06-15 NOTE — PROGRESS NOTES
OFFICE VISIT NOTE            Assessment/Plan for  Luba Nguyen is a 62 y.o. female.  No Patient Care Coordination Note on file.       1.  Atypical chest pain.  Normal cardiovascular exam.  Cardiogram unchanged  2.  History of supraventricular tachycardia  3.  Hyperlipidemia-Crestor initiated     Plan:  Continue current treatment plan  ECG on is reviewed compared to old ECGs s  CTA recently negative for obstruction  Continue beta-blocker  Continue Crestor      Diagnoses and all orders for this visit:    Atypical chest pain  -     Electrocardiogram Perform - Clinic    Atrial tachycardia determined by electrocardiography  -     Electrocardiogram Perform - Clinic        Perez Leroy MD  Internal medicine  [facility]  [phone]  [email or web address]    This is an electronically verified report by Perez Leryo M.D.  (Note created with Dragon voice recognition and unintended spelling errors and word substitutions may occur)             Subjective:   Chief Complaint:  Chest Pain; Back Pain; and Tachycardia    Wedding was excellent-last Friday    Uneventful  Has been now having some mid scapular and anterior chest discomfort happening at a nonexertional times.    Does not last long  Will be going on a trip and has concerns  She is not having any palpitations     Patient has had normal nuclear stress test 5/2017.  Coronary calcium score of 0    She has no respiratory symptoms    Medications:  Current Outpatient Prescriptions on File Prior to Visit   Medication Sig     ALPRAZolam (XANAX) 0.25 MG tablet Take 1 tablet (0.25 mg total) by mouth as needed for sleep or anxiety (PRN WHILE TRAVELING).     aspirin 81 MG EC tablet Take 1 tablet (81 mg total) by mouth daily.     cyanocobalamin (VITAMIN B-12) 1,000 mcg/mL injection Inject 1 mL (1,000 mcg total) into the shoulder, thigh, or buttocks every 30 (thirty) days.     docusate sodium (COLACE) 100 MG capsule Take 1 capsule (100 mg  total) by mouth 2 (two) times a day.     ergocalciferol (ERGOCALCIFEROL) 50,000 unit capsule TAKE 1 CAPSULE BY MOUTH EVERY WEEK     estrogens, conjugated, (PREMARIN) 0.45 MG tablet Take 1 tablet (0.45 mg total) by mouth daily.     LACTOBACILLUS RHAMNOSUS GG (CULTURELLE ORAL) Take by mouth.     lansoprazole (PREVACID) 30 MG capsule Take 1 capsule (30 mg total) by mouth daily with breakfast.     lansoprazole (PREVACID) 30 MG capsule TAKE 1 CAPSULE BY MOUTH TWICE A DAY     levothyroxine (SYNTHROID) 125 MCG tablet Take 1 tablet (125 mcg total) by mouth Daily at 6:00 am.     MELATONIN ORAL Take by mouth.     metoprolol succinate (TOPROL-XL) 50 MG 24 hr tablet Take 1 tablet (50 mg total) by mouth daily.     rosuvastatin (CRESTOR) 5 MG tablet Take 1 tablet Monday Wednesday Friday     VIT A/VIT C/VIT E/ZINC/COPPER (ICAPS AREDS ORAL) Take by mouth.     Current Facility-Administered Medications on File Prior to Visit   Medication     cyanocobalamin injection 1,000 mcg     Allergies:  Allergies   Allergen Reactions     Other Drug Allergy (See Comments)      Pt states she is allergic to all narcotics      Percocet [Oxycodone-Acetaminophen] Itching     Statins-Hmg-Coa Reductase Inhibitors Other (See Comments)     GI UPSET       Sulfa (Sulfonamide Antibiotics)      Vicodin [Hydrocodone-Acetaminophen] Itching     Zetia [Ezetimibe] Myalgia     Dyazide [Triamterene-Hydrochlorothiazid] Rash     Lisinopril Rash     Prilosec [Omeprazole] Rash       Review of Systems:     Extensive 10-point review of systems was performed. Please see the HPI for problem specific pertinent review of systems.     Patient does note she feels well without infectious symptoms  Otherwise, the following systems are noncontributory including constitutional, eyes, ears, nose and throat, cardiovascular, respiratory, gastrointestinal, genitourinary, musculoskeletal,neurological, skin and/or breast, endocrine, hematologic/lymph, allergic/immunologic and  "psychiatric.      Objective:    /80 (Patient Site: Right Arm, Patient Position: Sitting, Cuff Size: Adult Regular)  Pulse 73  Ht 5' 7.25\" (1.708 m)  Wt 172 lb 1.9 oz (78.1 kg)  LMP  (LMP Unknown)  SpO2 98%  Breastfeeding? No  BMI 26.76 kg/m2  Weight:   Wt Readings from Last 3 Encounters:   01/05/18 172 lb 1.9 oz (78.1 kg)   12/01/17 172 lb 1.3 oz (78.1 kg)   09/01/17 175 lb 1.3 oz (79.4 kg)     [unfilled]  172 lb 1.9 oz (78.1 kg)    In general, no acute distress.  Normal neck without adenopathy  Lungs clear  No chest wall pain  Cardiac regular no murmur  No edema good pulses  Gait normal    ECG normal sinus rhythm no change from old ECGs  Slight increased P-wave noted  Poor anterior progression is ruled changes and not new    Patient has had normal cardiac echocardiography        Review of clinical lab tests  Lab Results   Component Value Date    WBC 6.5 05/05/2017    HGB 14.1 05/05/2017    HCT 42.1 05/05/2017     05/05/2017    CHOL 288 (H) 12/01/2017    TRIG 156 (H) 12/01/2017    HDL 60 12/01/2017     05/05/2017    K 4.2 05/05/2017     05/05/2017    CREATININE 0.67 05/05/2017    BUN 10 05/05/2017    CO2 25 05/05/2017    TSH 0.67 05/05/2017    HGBA1C 5.8 04/08/2016       Glucose   Date/Time Value Ref Range Status   05/05/2017 09:05  70 - 125 mg/dL Final   04/08/2016 08:53  74 - 125 mg/dL Final   04/26/2015 07:40  (H) 70 - 125 mg/dL Final     Recent Results (from the past 24 hour(s))   Electrocardiogram Perform - Clinic   Result Value Ref Range    SYSTOLIC BLOOD PRESSURE  mmHg    DIASTOLIC BLOOD PRESSURE  mmHg    VENTRICULAR RATE 68 BPM    ATRIAL RATE 68 BPM    P-R INTERVAL 180 ms    QRS DURATION 98 ms    Q-T INTERVAL 422 ms    QTC CALCULATION (BEZET) 448 ms    P Axis 77 degrees    R AXIS -13 degrees    T AXIS 57 degrees    MUSE DIAGNOSIS       Normal sinus rhythm  Right atrial enlargement  Cannot rule out Anterior infarct (cited on or before " 05-MAY-2017)  Abnormal ECG  When compared with ECG of 05-MAY-2017 08:38,  Premature ventricular complexes are no longer Present  QT has shortened         No results found.

## 2021-06-16 NOTE — PROGRESS NOTES
OFFICE VISIT NOTE  Luba Nguyen   62 y.o. female            Assessment/Plan for  Luba Nguyen is a 62 y.o. female.  No Patient Care Coordination Note on file.       1. Tenosynovitis of finger  Questionperi-Infectious.  I do not see a skin infection.  I will treat symptomatically.  She will call if any worsening.  - Rapid Strep A Screen-Throat  - HM1(CBC and Differential)  - Erythrocyte Sedimentation Rate  - ibuprofen (ADVIL,MOTRIN) 600 MG tablet; Take 1 tablet (600 mg total) by mouth 3 (three) times a day.  Dispense: 30 tablet; Refill: 2  - HM1 (CBC with Diff)  - Group A Strep, RNA Direct Detection, Throat    2. SVT (supraventricular tachycardia)  Diagnosis of atrial tachyarrhythmia.  Still occasionally symptomatic  On beta-blocker  Patient desires [facility] cardiologist-Dr. Karla Olvera referral  - Ambulatory referral to Cardiology  3.  Hyperlipidemia on 3 times weekly Crestor-tolerating she did eat today.  She can go on fasting to her visit with cardiology and have her lipids checked at that time     Plan:  Ibuprofen 600 3 times daily caution with GI  Notify me any systemic symptomatology or other joint involvement or swelling  Strep screen  CBC sed rate    There are no Patient Instructions on file for this visit.    Diagnoses and all orders for this visit:    Tenosynovitis of finger  -     Rapid Strep A Screen-Throat  -     HM1(CBC and Differential)  -     Erythrocyte Sedimentation Rate  -     ibuprofen (ADVIL,MOTRIN) 600 MG tablet; Take 1 tablet (600 mg total) by mouth 3 (three) times a day.  Dispense: 30 tablet; Refill: 2  -     HM1 (CBC with Diff)  -     Group A Strep, RNA Direct Detection, Throat    SVT (supraventricular tachycardia)  -     Ambulatory referral to Cardiology        Medications after visit  Current Outpatient Prescriptions   Medication Sig Dispense Refill     aspirin 81 MG EC tablet Take 1 tablet (81 mg total) by mouth daily. 150 tablet 2     cyanocobalamin (VITAMIN B-12) 1,000  mcg/mL injection Inject 1 mL (1,000 mcg total) into the shoulder, thigh, or buttocks every 30 (thirty) days. 10 mL 1     docusate sodium (COLACE) 100 MG capsule Take 1 capsule (100 mg total) by mouth 2 (two) times a day. 60 capsule 2     ergocalciferol (ERGOCALCIFEROL) 50,000 unit capsule TAKE 1 CAPSULE BY MOUTH EVERY WEEK 26 capsule 0     estrogens, conjugated, (PREMARIN) 0.45 MG tablet Take 1 tablet (0.45 mg total) by mouth daily. 90 tablet 3     LACTOBACILLUS RHAMNOSUS GG (CULTURELLE ORAL) Take by mouth.       lansoprazole (PREVACID) 30 MG capsule Take 1 capsule (30 mg total) by mouth daily with breakfast. 90 capsule 3     levothyroxine (SYNTHROID) 125 MCG tablet Take 1 tablet (125 mcg total) by mouth Daily at 6:00 am. 90 tablet 3     MELATONIN ORAL Take by mouth.       metoprolol succinate (TOPROL-XL) 50 MG 24 hr tablet Take 1 tablet (50 mg total) by mouth daily. 180 tablet 3     rosuvastatin (CRESTOR) 5 MG tablet Take 1 tablet Monday Wednesday Friday 30 tablet 3     VIT A/VIT C/VIT E/ZINC/COPPER (ICAPS AREDS ORAL) Take by mouth.       ibuprofen (ADVIL,MOTRIN) 600 MG tablet Take 1 tablet (600 mg total) by mouth 3 (three) times a day. 30 tablet 2     lansoprazole (PREVACID) 30 MG capsule TAKE 1 CAPSULE BY MOUTH TWICE A  capsule 3     PREMARIN 0.45 mg tablet TAKE 1 TABLET (0.45 MG TOTAL) BY MOUTH DAILY. 90 tablet 2     Current Facility-Administered Medications   Medication Dose Route Frequency Provider Last Rate Last Dose     cyanocobalamin injection 1,000 mcg  1,000 mcg Intramuscular Q30 Days Theodore Moctezuma MD   1,000 mcg at 02/09/18 1020                      Perez Leroy MD  Internal medicine  [facility]  [phone]  [email or web address]    Much or all of the text in this note was generated through the use of Dragon Dictate voice-to-text software. Errors in spelling or words which seem out of context are unintentional.   Sound alike errors, in particular, may  have escaped editing.                 Subjective:   Chief Complaint:  Joint Swelling (Pt had a red swollen right thumb joint that has resolved at this point. She would like to discuss her sleep study results today. )    New issue  Pain over her right thumb  Fluctuating on and off since Sunday  She has not felt well for couple weeks.  She has had some cold sores and some nonspecific aches.  Sunday she felt significant pain and swelling in the left thumb area maybe even the wrist.  The swelling would come and go.  It was slightly red.  She did take ibuprofen.  She did golf.  The ibuprofen.  She almost did not come today because of improvement    She does not have any other significant joint pain    She did have a loose stool  She did not have to excuse herself from a social gathering she was not feeling well.  She did not have any souleymane palpitations at that time    She did have some cold sores a couple weeks ago.  She had strep in September.  She has not had any infectious exposure    She has never had a history of arthritis.        She is having occasional palpitations.  She is on metoprolol.  She had a Holter monitor consistent with atrial tachycardia.  There was some question initially whether this might have been fibrillation but two electrophysiologist Dr. Bynum and Dr. Granda believe this is atrial tachycardia     Review of Systems:     Extensive 10-point review of systems was performed. Please see the HPI for problem specific pertinent review of systems.     Patient does note her appetite is fine at this time    Otherwise, the following systems are noncontributory including constitutional, eyes, ears, nose and throat, cardiovascular, respiratory, gastrointestinal, genitourinary, musculoskeletal,neurological, skin and/or breast, endocrine, hematologic/lymph, allergic/immunologic and psychiatric.              Medications:  Current Outpatient Prescriptions on File Prior to Visit   Medication Sig     aspirin  81 MG EC tablet Take 1 tablet (81 mg total) by mouth daily.     cyanocobalamin (VITAMIN B-12) 1,000 mcg/mL injection Inject 1 mL (1,000 mcg total) into the shoulder, thigh, or buttocks every 30 (thirty) days.     docusate sodium (COLACE) 100 MG capsule Take 1 capsule (100 mg total) by mouth 2 (two) times a day.     ergocalciferol (ERGOCALCIFEROL) 50,000 unit capsule TAKE 1 CAPSULE BY MOUTH EVERY WEEK     estrogens, conjugated, (PREMARIN) 0.45 MG tablet Take 1 tablet (0.45 mg total) by mouth daily.     LACTOBACILLUS RHAMNOSUS GG (CULTURELLE ORAL) Take by mouth.     lansoprazole (PREVACID) 30 MG capsule Take 1 capsule (30 mg total) by mouth daily with breakfast.     levothyroxine (SYNTHROID) 125 MCG tablet Take 1 tablet (125 mcg total) by mouth Daily at 6:00 am.     MELATONIN ORAL Take by mouth.     metoprolol succinate (TOPROL-XL) 50 MG 24 hr tablet Take 1 tablet (50 mg total) by mouth daily.     rosuvastatin (CRESTOR) 5 MG tablet Take 1 tablet Monday Wednesday Friday     VIT A/VIT C/VIT E/ZINC/COPPER (ICAPS AREDS ORAL) Take by mouth.     lansoprazole (PREVACID) 30 MG capsule TAKE 1 CAPSULE BY MOUTH TWICE A DAY     PREMARIN 0.45 mg tablet TAKE 1 TABLET (0.45 MG TOTAL) BY MOUTH DAILY.     Current Facility-Administered Medications on File Prior to Visit   Medication     cyanocobalamin injection 1,000 mcg            Allergies:  Allergies   Allergen Reactions     Other Drug Allergy (See Comments)      Pt states she is allergic to all narcotics      Percocet [Oxycodone-Acetaminophen] Itching     Statins-Hmg-Coa Reductase Inhibitors Other (See Comments)     GI UPSET       Sulfa (Sulfonamide Antibiotics)      Vicodin [Hydrocodone-Acetaminophen] Itching     Zetia [Ezetimibe] Myalgia     Dyazide [Triamterene-Hydrochlorothiazid] Rash     Lisinopril Rash     Prilosec [Omeprazole] Rash       PSFHx: Tobacco Status:  She  reports that she has never smoked. She has never used smokeless tobacco.   Alcohol Status:    History    Alcohol Use     Yes     Comment: Occasional       reports that she has never smoked. She has never used smokeless tobacco. She reports that she drinks alcohol. Her drug history is not on file.    Objective:    /60 (Patient Site: Left Arm, Patient Position: Sitting, Cuff Size: Adult Regular)  Pulse 78  Temp 97.6  F (36.4  C)  Wt 178 lb 12 oz (81.1 kg)  LMP  (LMP Unknown)  BMI 27.79 kg/m2  Weight:   Wt Readings from Last 3 Encounters:   03/21/18 178 lb 12 oz (81.1 kg)   01/05/18 172 lb 1.9 oz (78.1 kg)   12/01/17 172 lb 1.3 oz (78.1 kg)     BP Readings from Last 3 Encounters:   03/21/18 110/60   01/05/18 126/80   12/01/17 138/84         General-appears well, no acute distress.  She looks well  Throat is normal  There are no cold sores on her lips  Joint exam is unremarkable  She has tenderness over the dorsal aspect of the left thumb.  It is tender.  There is a hint of erythema.  There is some swelling.  Her wrist MCPs PIPs look normal to me.  Her gait is normal      Review of clinical lab tests  Lab Results   Component Value Date    WBC 6.5 05/05/2017    HGB 14.1 05/05/2017    HCT 42.1 05/05/2017     05/05/2017    CHOL 288 (H) 12/01/2017    TRIG 156 (H) 12/01/2017    HDL 60 12/01/2017     05/05/2017    K 4.2 05/05/2017     05/05/2017    CREATININE 0.67 05/05/2017    BUN 10 05/05/2017    CO2 25 05/05/2017    TSH 0.67 05/05/2017    HGBA1C 5.8 04/08/2016       Glucose   Date/Time Value Ref Range Status   05/05/2017 09:05  70 - 125 mg/dL Final   04/08/2016 08:53  74 - 125 mg/dL Final   04/26/2015 07:40  (H) 70 - 125 mg/dL Final     Recent Results (from the past 24 hour(s))   Rapid Strep A Screen-Throat   Result Value Ref Range    Rapid Strep A Antigen No Group A Strep detected, presumptive negative No Group A Strep detected, presumptive negative       RADIOLOGY: No results found.    Review of recent consultation-reviewed Holter, stress test.

## 2021-06-16 NOTE — TELEPHONE ENCOUNTER
Telephone Encounter by Theresa Otero CMA at 5/24/2019  9:25 AM     Author: Theresa Otero CMA Service: -- Author Type: Medical Assistant    Filed: 5/24/2019  9:28 AM Encounter Date: 5/24/2019 Status: Signed    : Theresa Otero CMA (Medical Assistant)       Spoke with the patient and relayed the following message from Dr. Leroy:   Perez Leroy MD  You; [facility] [place] [facility] Support Pool 8 minutes ago (9:15 AM)      Call patient.  Sent in Z-Shamir to [facility] pharmacy      Patient verbalized understanding and had no further questions at this time.  Theresa MERRITT CMA/ARTIE....................9:28 AM

## 2021-06-16 NOTE — TELEPHONE ENCOUNTER
Telephone Encounter by Isacc Baig at 3/30/2020  6:23 AM     Author: Isacc Baig Service: -- Author Type: --    Filed: 3/30/2020  6:27 AM Encounter Date: 3/25/2020 Status: Signed    : Isacc Baig       PRIOR AUTHORIZATION NOT NEEDED    Per response from the patient's insurance: Comments: The member has been getting the brand. It's not rejecting. The way her benefits are written, whether it is DAW1 or DAW2, she will pay the difference between the two besides her copay. But, PA is not required. Copay overrides are not allowed and haven't been for about 5 years.

## 2021-06-16 NOTE — TELEPHONE ENCOUNTER
RN cannot approve Refill Request    RN can NOT refill this medication No established PCP and  . Last office visit: 10/31/2019 Perez Leroy MD Last Physical: 7/25/2019 Last MTM visit: Visit date not found Last visit same specialty: 7/8/2020 Jose Martin Albrecht MD.  Next visit within 3 mo: Visit date not found  Next physical within 3 mo: Visit date not found      Carmen Melgar, [facility] Triage/Med Refill 4/8/2021    Requested Prescriptions   Pending Prescriptions Disp Refills     ergocalciferol (ERGOCALCIFEROL) 1,250 mcg (50,000 unit) capsule [Pharmacy Med Name: VITAMIN D2 1.25MG(50,000 UNIT)] 12 capsule 3     Sig: TAKE 1 CAPSULE BY MOUTH WEEKLY       There is no refill protocol information for this order        rosuvastatin (CRESTOR) 5 MG tablet [Pharmacy Med Name: ROSUVASTATIN CALCIUM 5 MG TAB] 36 tablet 3     Sig: TAKE 1 TABLET BY MOUTH ON MONDAY, WEDNESDAY, AND FRIDAY       Statins Refill Protocol (Hmg CoA Reductase Inhibitors) Passed - 4/8/2021  9:18 AM        Passed - PCP or prescribing provider visit in past 12 months      Last office visit with prescriber/PCP: 10/31/2019 Perez Leroy MD OR same dept: 7/8/2020 Jose Martin Albrecht MD OR same specialty: 7/8/2020 Jose Martin Albrecht MD  Last physical: 7/25/2019 Last MTM visit: Visit date not found   Next visit within 3 mo: Visit date not found  Next physical within 3 mo: Visit date not found  Prescriber OR PCP: Perez Leroy MD  Last diagnosis associated with med order: 1. Postmenopausal  - ergocalciferol (ERGOCALCIFEROL) 1,250 mcg (50,000 unit) capsule [Pharmacy Med Name: VITAMIN D2 1.25MG(50,000 UNIT)]; TAKE 1 CAPSULE BY MOUTH WEEKLY  Dispense: 12 capsule; Refill: 3    2. Hyperlipidemia  - rosuvastatin (CRESTOR) 5 MG tablet [Pharmacy Med Name: ROSUVASTATIN CALCIUM 5 MG TAB]; TAKE 1 TABLET BY MOUTH ON MONDAY, WEDNESDAY, AND FRIDAY  Dispense: 36 tablet; Refill: 3    If protocol passes may refill for 12 months if within 3 months of last provider visit (or a  total of 15 months).

## 2021-06-16 NOTE — TELEPHONE ENCOUNTER
Telephone Encounter by Theresa Otero CMA at 11/22/2019  9:37 AM     Author: Theresa Otero CMA Service: -- Author Type: Medical Assistant    Filed: 11/22/2019  9:38 AM Encounter Date: 11/22/2019 Status: Signed    : Theresa Otero CMA (Medical Assistant)       Per message from Dr. Leroy:  Perez Leroy MD  You 1 hour ago (8:12 AM)      Yes     Perez Leroy MD   Internal medicine   [facility]   [phone]   [email or web address]      Patient will be given her B12 injection when she comes to clinic today.  Theresa MERRITT CMA/ARTIE....................9:38 AM

## 2021-06-16 NOTE — TELEPHONE ENCOUNTER
Telephone Encounter by Theresa Otero CMA at 1/13/2020  4:28 PM     Author: Theresa Otero CMA Service: -- Author Type: Medical Assistant    Filed: 1/13/2020  4:29 PM Encounter Date: 1/13/2020 Status: Signed    : Theresa Otero CMA (Medical Assistant)       Spoke with the patient and relayed the following message from Dr. Leroy:  Perez Leroy MD  You 1 hour ago (3:25 PM)      Upon her return     Perez Leroy MD   Internal medicine   [facility]   [phone]   [email or web address]    Routing comment      She verbalized understanding and had no further questions at this time.  Theresa MERRITT CMA/ARTIE....................4:29 PM

## 2021-06-17 NOTE — PATIENT INSTRUCTIONS - HE
Patient Instructions by Perez Leroy MD at 1/16/2019  3:00 PM     Author: Perez Leroy MD Service: -- Author Type: Physician    Filed: 1/16/2019  3:28 PM Encounter Date: 1/16/2019 Status: Signed    : Perez Leroy MD (Physician)       CT CARDIAC CORONARY ARTERIES DUAL READ6/9/2017  [facility]  Result Narrative   [facility]----CORONARY CT ANGIOGRAM    Date: 6/9/2017  Indication: Chest pain with equivocal stress test, atrial tachycardia and   shortness of breath.    CONCLUSIONS:  1. Normal coronary arteries without detectable plaque or stenosis.  The   total coronary calcium score is zero.  2. Please review radiology portion for incidental noncardiac findings.    TECHNIQUE: ECG-gated CT angiogram of the heart with intravenous contrast   (Omnipaque 350, 110 mL at an injection rate of 6 mL/s) was performed on a   Siemens SOMATOM Definition Dual Source CT scanner. Coronary artery calcium   scoring was performed. The imaging protocol was individualized to minimize   radiation exposure. The following acquisition protocol was used for   coronary CT angiography: Retrospective. Pulse range 65-75 with min dosing.   Tube potential: 120 kV. Tube current: 5639 mAs. Total DLP: 585 mGy*cm. 8.1   mSv. CTDI: 49. Image post processing was performed on a [employer]   [employer] Workstation. Images were reconstructed at a slice thickness of 0.6   mm with 0.3 mm overlap. The best image reconstructions were obtained at   73% of the cardiac cycle. The patient received the following medications:   30 mg metoprolol IV, 0.4 mg SL nitroglycerin and usual home medications   including metoprolol and diltiazem by mouth. There were no immediate   complications.     STUDY QUALITY:  Excellent.    FINDINGS    CT WITHOUT CONTRAST    CORONARY CALCIUM SCORE FINDINGS: The total Agatston calcium score is zero.   No coronary calcium is detected.     GENERAL RECOMMENDATIONS: Adoption and  maintenance of a healthy lifestyle   is recommended for all people. This should include regular, appropriate   exercise and observance of a proper diet, to ensure balanced nutrition and   weight control. Tobacco use should be avoided. Cholesterol has been linked   to coronary atherosclerosis; thus, we strongly encourage adherence to   current cholesterol treatment guidelines. However note that these are   general recommendations only, and as with all such matters, the personal   physician should be consulted regarding recommendations appropriate for   the individual.    Please review radiology portion of incidental noncardiac findings.    CT WITH CONTRAST    DOMINANCE:  Right.    LEFT MAIN: The left main arises normally from the left coronary cusp and   is widely patent without any detectable stenosis or plaque.    LEFT ANTERIOR DESCENDING: The left anterior descending and its major   diagonal branches are widely patent without any detectable stenosis or   plaque.    LEFT CIRCUMFLEX: The left circumflex and its major branches are widely   patent without any detectable stenosis or plaque.    RIGHT CORONARY ARTERY: The right coronary artery and its major branches   are widely patent without any detectable stenosis or plaque.    ADDITIONAL FINDINGS: Normal pulmonary venous anatomy with all pulmonary   veins draining into the left atrium. No left atrial or left ventricular   mass or thrombus. Tricuspid aortic valve. Normal sized aortic root. Normal   caliber of the visualized proximal ascending aorta. Normal pericardial   thickness. No pericardial effusion. Proximal pulmonary arteries are well   opacified. Left atrial appendage is well-visualized and is free of   thrombus. Radiology review for incidental noncardiac findings is as noted   below.    Ale Prakash M.D.  [facility]  [email or web address]  D:6/9/2017  T:6/9/2017      OVER READ: DETAILED [facility] EXTRACARDIAC OVER READ OF CARDIAC   CT    6/9/2017 9:10 AM    INDICATION: Cardiac - Chest Pain With Equivocal Stress Test Atrial   Tachycardia (HCC) SOB (shortness of breath).  TECHNIQUE: Dose reduction techniques were used.  COMPARISON: None.    FINDINGS:    LIMITED CHEST: Negative.  LIMITED MEDIASTINUM: Negative.  LIMITED UPPER ABDOMEN: Negative.    CONCLUSION:    No significant incidental extracardiac findings.  Please refer to cardiologist's dictation for the cardiac CT report

## 2021-06-18 NOTE — PROGRESS NOTES
Call    Too much thyroid can cause rapid heart beats and sweating    TSH inversely related to thyroid status  Your low tsh suggest thyroid excess    I think you will do better on .112mg of synthroid rather than .125mg    Check thyroid lab in 2 months and update me in a month how you are doing.    Perez Leroy MD  Internal medicine  [facility]  [phone]  [email or web address]        I will send in new dose

## 2021-06-19 NOTE — PROGRESS NOTES
..Ms. Nguyen arrived at clinic for Cyanocobalamin injection given Intramuscular at Left deltoid.      Injection was given without incidence.

## 2021-06-19 NOTE — PROGRESS NOTES
Office Visit - Follow Up   Luba Nguyen   62 y.o. female    Date of Visit: 2018    Chief Complaint   Patient presents with     Shortness of Breath     Palpitations        Assessment and Plan   1. Palpitations  Likely atrial ectopy, we discussed repeat Holter monitor to confirm this versus restarting metoprolol.  She did not necessarily like how she felt on metoprolol.  She does not want to repeat a Holter.  We will therefore restart metoprolol 25 mg daily which is a reduced dose from which she is previously on.  Because of her palpitations anxiety and hot flashes she was tested and seen an endocrinologist.  She can discuss this with Dr. Perez Leroy and in the meantime we will check urine studies as below as well as follow-up her thyroid function.  - Catecholamine Fractionation, Free, 24 Hour Urine; Future  - Metanephrines Fractionated by HPLC-MS/MS, Urine; Future    2. Anxiety  - Catecholamine Fractionation, Free, 24 Hour Urine; Future  - Metanephrines Fractionated by HPLC-MS/MS, Urine; Future    3. Elevated blood pressure reading without diagnosis of hypertension  She will be resuming metoprolol  - Catecholamine Fractionation, Free, 24 Hour Urine; Future  - Metanephrines Fractionated by HPLC-MS/MS, Urine; Future    4. Hot flashes  As above she is on hormone therapy  - Catecholamine Fractionation, Free, 24 Hour Urine; Future  - Metanephrines Fractionated by HPLC-MS/MS, Urine; Future  - 5-Hydroxyindoleacetic Acid (HIAA), Urine; Future    5. Hypothyroidism  Continue levothyroxine  - Thyroid Stimulating Hormone (TSH)  - T4, Free  - T3, Total    Return in about 4 weeks (around 2018) for follow up with PCP.     History of Present Illness   This 62 y.o. old woman of Dr. Perez Leroy comes in for evaluation of palpitations, anxiety, elevated blood pressure, episodes of hot flashes.  Her mother  yesterday.  She wonders if this is increased her anxiety.  She has history of palpitations and atrial  "tachycardia.  She had a Holter monitor in 2017 which were reviewed as well as 2018 which we also reviewed.  Between those 2 Holter monitors she was put on metoprolol and her atrial ectopy improved significantly.  She was therefore weaned off metoprolol and since weaning off of this has noticed more palpitations.  She also had her thyroid medication decreased because she is having some hot flashes and her TSH was suppressed.  This did not result in less hot flashes.  She has been on hormone therapy.  She did switch from oral therapy to a patch and this seems to be when her hot flashes worsen.  She is now switched her patch and is back on oral therapy but her hot flashes continue.  She has had intermittent elevated blood pressure readings.    Review of Systems: A comprehensive review of systems was negative except as noted.     Medications, Allergies and Problem List   Reviewed and updated     Physical Exam   General Appearance:   No acute distress    /66 (Patient Site: Right Arm, Patient Position: Sitting, Cuff Size: Adult Regular)  Pulse 71  Ht 5' 7.25\" (1.708 m)  Wt 172 lb (78 kg)  LMP  (LMP Unknown)  SpO2 97%  BMI 26.74 kg/m2    HEENT exam is unremarkable  Neck supple no thyromegaly or nodule palpable  Lymphatic no cervical lymphadenopathy  Cardiovascular regular rate and rhythm no murmur gallop or rub  Pulmonary lungs are clear to auscultation bilaterally  Gastrointestinall abdomen soft nontender nondistended no organomegaly  Neurologic exam is non focal  Psychiatric pleasant, no confusion or agitation        Additional Information   Current Outpatient Prescriptions   Medication Sig Dispense Refill     cyanocobalamin (VITAMIN B-12) 1,000 mcg/mL injection Inject 1 mL (1,000 mcg total) into the shoulder, thigh, or buttocks every 30 (thirty) days. 10 mL 1     ergocalciferol (ERGOCALCIFEROL) 50,000 unit capsule TAKE 1 CAPSULE BY MOUTH EVERY WEEK 26 capsule 0     estrogens, conjugated, (PREMARIN) 0.45 MG " tablet TAKE 1 TABLET (0.45 MG TOTAL) BY MOUTH DAILY. 90 tablet 2     ibuprofen (ADVIL,MOTRIN) 600 MG tablet Take 1 tablet (600 mg total) by mouth 3 (three) times a day. 30 tablet 2     LACTOBACILLUS RHAMNOSUS GG (CULTURELLE ORAL) Take by mouth.       lansoprazole (PREVACID) 30 MG capsule Take 1 capsule (30 mg total) by mouth daily with breakfast. 90 capsule 3     levothyroxine (SYNTHROID) 112 MCG tablet Take 1 tablet (112 mcg total) by mouth daily. 30 tablet 11     MELATONIN ORAL Take by mouth.       rosuvastatin (CRESTOR) 5 MG tablet Take 1 tablet Monday Wednesday Friday 30 tablet 3     VIT A/VIT C/VIT E/ZINC/COPPER (ICAPS AREDS ORAL) Take by mouth.       metoprolol succinate (TOPROL-XL) 25 MG Take 1 tablet (25 mg total) by mouth daily. 90 tablet 3     Current Facility-Administered Medications   Medication Dose Route Frequency Provider Last Rate Last Dose     cyanocobalamin injection 1,000 mcg  1,000 mcg Intramuscular Q30 Days Theodore Moctezuma MD   1,000 mcg at 08/08/18 0955     Allergies   Allergen Reactions     Other Drug Allergy (See Comments)      Pt states she is allergic to all narcotics      Percocet [Oxycodone-Acetaminophen] Itching     Statins-Hmg-Coa Reductase Inhibitors Other (See Comments)     GI UPSET       Sulfa (Sulfonamide Antibiotics)      Vicodin [Hydrocodone-Acetaminophen] Itching     Zetia [Ezetimibe] Myalgia     Dyazide [Triamterene-Hydrochlorothiazid] Rash     Lisinopril Rash     Prilosec [Omeprazole] Rash     Social History   Substance Use Topics     Smoking status: Never Smoker     Smokeless tobacco: Never Used     Alcohol use Yes      Comment: Occasional       Review and/or order of clinical lab tests:  Review and/or order of radiology tests:  Review and/or order of medicine tests:  Discussion of test results with performing physician:  Decision to obtain old records and/or obtain history from someone other than the patient:  Review and summarization of old records and/or obtaining history  from someone other than the patient and.or discussion of case with another health care provider:  Independent visualization of image, tracing or specimen itself:    Time:      Franc Morris MD

## 2021-06-20 NOTE — LETTER
Letter by Jose Martin Albrecht MD at      Author: Jose Martin Albrecht MD Service: -- Author Type: --    Filed:  Encounter Date: 7/9/2020 Status: (Other)         Luba Nguyen  [id number] [address] [id number]             July 9, 2020         Dear Ms. Nguyen,    Below are the results from your recent visit:    Resulted Orders   Comprehensive Metabolic Panel   Result Value Ref Range    Sodium 140 136 - 145 mmol/L    Potassium 4.5 3.5 - 5.0 mmol/L    Chloride 104 98 - 107 mmol/L    CO2 24 22 - 31 mmol/L    Anion Gap, Calculation 12 5 - 18 mmol/L    Glucose 109 70 - 125 mg/dL    BUN 12 8 - 22 mg/dL    Creatinine 0.68 0.60 - 1.10 mg/dL    GFR MDRD Af Amer >60 >60 mL/min/1.73m2    GFR MDRD Non Af Amer >60 >60 mL/min/1.73m2    Bilirubin, Total 0.4 0.0 - 1.0 mg/dL    Calcium 10.0 8.5 - 10.5 mg/dL    Protein, Total 7.5 6.0 - 8.0 g/dL    Albumin 4.2 3.5 - 5.0 g/dL    Alkaline Phosphatase 74 45 - 120 U/L    AST 30 0 - 40 U/L    ALT 26 0 - 45 U/L    Narrative    Fasting Glucose reference range is 70-99 mg/dL per  American Diabetes Association (ADA) guidelines.   Thyroid Cascade   Result Value Ref Range    TSH 0.10 (L) 0.30 - 5.00 uIU/mL   HM2(CBC w/o Differential)   Result Value Ref Range    WBC 6.7 4.0 - 11.0 thou/uL    RBC 4.85 3.80 - 5.40 mill/uL    Hemoglobin 14.8 12.0 - 16.0 g/dL    Hematocrit 43.1 35.0 - 47.0 %    MCV 89 80 - 100 fL    MCH 30.6 27.0 - 34.0 pg    MCHC 34.4 32.0 - 36.0 g/dL    RDW 11.7 11.0 - 14.5 %    Platelets 228 140 - 440 thou/uL    MPV 7.8 7.0 - 10.0 fL   Glycosylated Hemoglobin A1c   Result Value Ref Range    Hemoglobin A1c 5.9 3.5 - 6.0 %   CK Total   Result Value Ref Range    CK, Total 89 30 - 190 U/L   Vitamin B12   Result Value Ref Range    Vitamin B-12 502 213 - 816 pg/mL   T4, Free   Result Value Ref Range    Free T4 1.2 0.7 - 1.8 ng/dL       You may be getting a little too much thyroid.  I will let you discuss that with Dr. Morris.  Everything else here looks good.  Your average sugar is  in the prediabetic range.  Something that will need to be followed, but I would not think would cause your symptoms.     Please call with questions or contact us using [email or web address].    Sincerely,        Electronically signed by Jose Martin Albrecht MD

## 2021-06-20 NOTE — PROGRESS NOTES
OFFICE VISIT NOTE  Luba Nguyen   62 y.o. female            Assessment/Plan for  Luba Nguyen is a 62 y.o. female.  No Patient Care Coordination Note on file.       1. Vasovagal syncope  With ER visit in [place].  [place] to be vasovagal.    2. Grief reaction  Rx clonazepam.  Discussion  - clonazePAM (KLONOPIN) 0.5 MG tablet; Take 1 tablet (0.5 mg total) by mouth at bedtime.  Dispense: 90 tablet; Refill: 0    3. Situational anxiety  As above.      4. Palpitations   Continue metoprolol  - metoprolol succinate (TOPROL-XL) 25 MG; Take 1 tablet (25 mg total) by mouth daily.  Dispense: 90 tablet; Refill: 3    5. Hot flashes  Change Premarin to twice daily.  - PREMARIN 0.3 mg tablet; Take 1 tablet (0.3 mg total) by mouth 2 (two) times a day.  Dispense: 180 tablet; Refill: 0    6. Near syncope  Probable vasovagal         Plan:  Change Premarin to twice daily  Klonopin 0.5 at bedtime  Consider SSRI-declines at this time  Follow-up scheduled  Notify me no change  Reviewed laboratory.    There are no Patient Instructions on file for this visit.    Diagnoses and all orders for this visit:    Situational anxiety    Vasovagal syncope    Grief reaction  -     Discontinue: clonazePAM (KLONOPIN) 0.5 MG tablet; Take 1 tablet (0.5 mg total) by mouth 2 (two) times a day as needed for anxiety.  Dispense: 90 tablet; Refill: 0  -     clonazePAM (KLONOPIN) 0.5 MG tablet; Take 1 tablet (0.5 mg total) by mouth at bedtime.  Dispense: 90 tablet; Refill: 0    Palpitations  -     metoprolol succinate (TOPROL-XL) 25 MG; Take 1 tablet (25 mg total) by mouth daily.  Dispense: 90 tablet; Refill: 3    Hot flashes  -     Discontinue: estrogens, conjugated, (PREMARIN) 0.3 MG tablet; Take 1 tablet (0.3 mg total) by mouth daily.  Dispense: 30 tablet; Refill: 11  -     Discontinue: estrogens, conjugated, (PREMARIN) 0.3 MG tablet; Take 1 tablet (0.3 mg total) by mouth 2 (two) times a day.  Dispense: 180 tablet; Refill: 0  -     PREMARIN 0.3 mg  tablet; Take 1 tablet (0.3 mg total) by mouth 2 (two) times a day.  Dispense: 180 tablet; Refill: 0    Near syncope        Medications after visit  Current Outpatient Prescriptions   Medication Sig Dispense Refill     cyanocobalamin (VITAMIN B-12) 1,000 mcg/mL injection Inject 1 mL (1,000 mcg total) into the shoulder, thigh, or buttocks every 30 (thirty) days. 10 mL 1     ergocalciferol (ERGOCALCIFEROL) 50,000 unit capsule TAKE 1 CAPSULE BY MOUTH EVERY WEEK 26 capsule 0     ibuprofen (ADVIL,MOTRIN) 600 MG tablet Take 1 tablet (600 mg total) by mouth 3 (three) times a day. 30 tablet 2     LACTOBACILLUS RHAMNOSUS GG (CULTURELLE ORAL) Take by mouth.       lansoprazole (PREVACID) 30 MG capsule Take 1 capsule (30 mg total) by mouth daily with breakfast. 90 capsule 3     levothyroxine (SYNTHROID) 112 MCG tablet Take 1 tablet (112 mcg total) by mouth daily. 30 tablet 11     MELATONIN ORAL Take by mouth.       metoprolol succinate (TOPROL-XL) 25 MG Take 1 tablet (25 mg total) by mouth daily. 90 tablet 3     rosuvastatin (CRESTOR) 5 MG tablet Take 1 tablet Monday Wednesday Friday 30 tablet 3     VIT A/VIT C/VIT E/ZINC/COPPER (ICAPS AREDS ORAL) Take by mouth.       clonazePAM (KLONOPIN) 0.5 MG tablet Take 1 tablet (0.5 mg total) by mouth at bedtime. 90 tablet 0     PREMARIN 0.3 mg tablet Take 1 tablet (0.3 mg total) by mouth 2 (two) times a day. 180 tablet 0     Current Facility-Administered Medications   Medication Dose Route Frequency Provider Last Rate Last Dose     cyanocobalamin injection 1,000 mcg  1,000 mcg Intramuscular Q30 Days Theodore Moctezuma MD   1,000 mcg at 09/10/18 1030                      Perez Leroy MD  Internal medicine  [facility]  [phone]  [email or web address]    Much or all of the text in this note was generated through the use of Dragon Dictate voice-to-text software. Errors in spelling or words which seem out of context are unintentional.   Sound alike  errors, in particular, may have escaped editing.                 Subjective:   Chief Complaint:  ER Follow Up (Seen in [place], elvated lactic acid level); Palpitations (Seen for panic attack); and Medication Refill    Seen in [place]  Reviewed  Patient has been under a lot of stress      History of palpitations and SVT.  No evidence of that in ER.  He KG was unchanged.  She has an old bundle branch block  She is having hot flashes.  She is on 0. 4 5 of Premarin.  Discussed changing that to patch-did not did not work in the past    We will increase Premarin to 0.3 twice daily      Review of Systems:     Extensive 10-point review of systems was performed. Please see the HPI for problem specific pertinent review of systems.     Patient does note her appetite is okay.  She has lost some weight.  She does mourn the loss of her mother.-Discussed  She has good friends who are confidants    Otherwise, the following systems are noncontributory including constitutional, eyes, ears, nose and throat, cardiovascular, respiratory, gastrointestinal, genitourinary, musculoskeletal,neurological, skin and/or breast, endocrine, hematologic/lymph, allergic/immunologic and psychiatric.              Medications:  Current Outpatient Prescriptions on File Prior to Visit   Medication Sig     cyanocobalamin (VITAMIN B-12) 1,000 mcg/mL injection Inject 1 mL (1,000 mcg total) into the shoulder, thigh, or buttocks every 30 (thirty) days.     ergocalciferol (ERGOCALCIFEROL) 50,000 unit capsule TAKE 1 CAPSULE BY MOUTH EVERY WEEK     ibuprofen (ADVIL,MOTRIN) 600 MG tablet Take 1 tablet (600 mg total) by mouth 3 (three) times a day.     LACTOBACILLUS RHAMNOSUS GG (CULTURELLE ORAL) Take by mouth.     lansoprazole (PREVACID) 30 MG capsule Take 1 capsule (30 mg total) by mouth daily with breakfast.     levothyroxine (SYNTHROID) 112 MCG tablet Take 1 tablet (112 mcg total) by mouth daily.     MELATONIN ORAL Take by mouth.     rosuvastatin (CRESTOR)  "5 MG tablet Take 1 tablet Monday Wednesday Friday     VIT A/VIT C/VIT E/ZINC/COPPER (ICAPS AREDS ORAL) Take by mouth.     [DISCONTINUED] estrogens, conjugated, (PREMARIN) 0.45 MG tablet TAKE 1 TABLET (0.45 MG TOTAL) BY MOUTH DAILY.     [DISCONTINUED] metoprolol succinate (TOPROL-XL) 25 MG Take 1 tablet (25 mg total) by mouth daily.     Current Facility-Administered Medications on File Prior to Visit   Medication     cyanocobalamin injection 1,000 mcg            Allergies:  Allergies   Allergen Reactions     Other Drug Allergy (See Comments)      Pt states she is allergic to all narcotics      Percocet [Oxycodone-Acetaminophen] Itching     Statins-Hmg-Coa Reductase Inhibitors Other (See Comments)     GI UPSET       Sulfa (Sulfonamide Antibiotics)      Vicodin [Hydrocodone-Acetaminophen] Itching     Zetia [Ezetimibe] Myalgia     Dyazide [Triamterene-Hydrochlorothiazid] Rash     Lisinopril Rash     Prilosec [Omeprazole] Rash       PSFHx: Tobacco Status:  She  reports that she has never smoked. She has never used smokeless tobacco.   Alcohol Status:    History   Alcohol Use     Yes     Comment: Occasional       reports that she has never smoked. She has never used smokeless tobacco. She reports that she drinks alcohol. Her drug history is not on file.    Objective:    /76 (Patient Site: Left Arm, Patient Position: Sitting, Cuff Size: Adult Regular)  Pulse 75  Ht 5' 7.25\" (1.708 m)  Wt 166 lb 1.9 oz (75.4 kg)  LMP  (LMP Unknown)  SpO2 98%  Breastfeeding? No  BMI 25.82 kg/m2  Weight:   Wt Readings from Last 3 Encounters:   09/10/18 166 lb 1.9 oz (75.4 kg)   08/08/18 172 lb (78 kg)   05/11/18 172 lb 1.9 oz (78.1 kg)     BP Readings from Last 3 Encounters:   09/10/18 134/76   08/08/18 144/66   05/11/18 124/76         General-appears well, no acute distress.  Pulse regular throughout  Chest clear  No murmur  No pauses or tachycardia    Reviewed [place] ER note      Review of clinical lab tests  Lab Results "   Component Value Date    WBC 5.8 03/21/2018    HGB 13.2 03/21/2018    HCT 39.7 03/21/2018     03/21/2018    CHOL 288 (H) 12/01/2017    TRIG 156 (H) 12/01/2017    HDL 60 12/01/2017    ALT 26 05/11/2018    AST 27 05/11/2018     05/11/2018    K 4.2 05/11/2018     05/11/2018    CREATININE 0.63 05/11/2018    BUN 11 05/11/2018    CO2 27 05/11/2018    TSH 1.19 08/08/2018    HGBA1C 5.8 04/08/2016       Glucose   Date/Time Value Ref Range Status   05/11/2018 04:31 PM 96 70 - 125 mg/dL Final   05/05/2017 09:05  70 - 125 mg/dL Final   04/08/2016 08:53  74 - 125 mg/dL Final   04/26/2015 07:40  (H) 70 - 125 mg/dL Final     No results found for this or any previous visit (from the past 24 hour(s)).    RADIOLOGY: No results found.    Review of recent consultation-reviewed ER note

## 2021-06-21 NOTE — PROGRESS NOTES
OFFICE VISIT NOTE  Luba Nguyen   63 y.o. female            Assessment/Plan for  Luba Nguyen is a 63 y.o. female.  No Patient Care Coordination Note on file.       1. Need for prophylactic vaccination and inoculation against influenza     - Influenza, Recombinant, Inj, Quadrivalent, PF, 18+YRS    2. Hot flashes  Back on estrogen 0.45.  Beneficial.  - estrogens, conjugated, (PREMARIN) 0.45 MG tablet; Take 1 tablet (0.45 mg total) by mouth daily.  Dispense: 30 tablet; Refill: 11    3. Postmenopausal     - estrogens, conjugated, (PREMARIN) 0.45 MG tablet; Take 1 tablet (0.45 mg total) by mouth daily.  Dispense: 30 tablet; Refill: 11    4. Grief reaction  Rx Wellbutrin.  Prozac in her past for depression.  She had calcium which was helpful.  She had bad dreams on Prozac    Update me if no change in 2-3 weeks  RTC 3 months  - buPROPion (WELLBUTRIN XL) 150 MG 24 hr tablet; Take 1 tablet (150 mg total) by mouth at bedtime.  Dispense: 30 tablet; Refill: 2  - clonazePAM (KLONOPIN) 0.5 MG tablet; Take 1 tablet (0.5 mg total) by mouth at bedtime as needed for anxiety.  Dispense: 30 tablet; Refill: 0        Plan:  PRN Klonopin  Wellbutrin Exar 1 daily  Clinic 3 months  Notify me no changes    There are no Patient Instructions on file for this visit.    Diagnoses and all orders for this visit:    Need for prophylactic vaccination and inoculation against influenza  -     Influenza, Recombinant, Inj, Quadrivalent, PF, 18+YRS    Hot flashes  -     estrogens, conjugated, (PREMARIN) 0.45 MG tablet; Take 1 tablet (0.45 mg total) by mouth daily.  Dispense: 30 tablet; Refill: 11    Postmenopausal  -     estrogens, conjugated, (PREMARIN) 0.45 MG tablet; Take 1 tablet (0.45 mg total) by mouth daily.  Dispense: 30 tablet; Refill: 11    Grief reaction  -     buPROPion (WELLBUTRIN XL) 150 MG 24 hr tablet; Take 1 tablet (150 mg total) by mouth at bedtime.  Dispense: 30 tablet; Refill: 2  -     clonazePAM (KLONOPIN) 0.5 MG tablet;  Take 1 tablet (0.5 mg total) by mouth at bedtime as needed for anxiety.  Dispense: 30 tablet; Refill: 0        Medications after visit  Current Outpatient Prescriptions   Medication Sig Dispense Refill     clonazePAM (KLONOPIN) 0.5 MG tablet Take 1 tablet (0.5 mg total) by mouth at bedtime as needed for anxiety. 30 tablet 0     cyanocobalamin (VITAMIN B-12) 1,000 mcg/mL injection Inject 1 mL (1,000 mcg total) into the shoulder, thigh, or buttocks every 30 (thirty) days. 10 mL 1     ergocalciferol (ERGOCALCIFEROL) 50,000 unit capsule TAKE 1 CAPSULE BY MOUTH EVERY WEEK 26 capsule 0     ibuprofen (ADVIL,MOTRIN) 600 MG tablet Take 1 tablet (600 mg total) by mouth 3 (three) times a day. 30 tablet 2     LACTOBACILLUS RHAMNOSUS GG (CULTURELLE ORAL) Take by mouth.       lansoprazole (PREVACID) 30 MG capsule Take 1 capsule (30 mg total) by mouth daily with breakfast. 90 capsule 3     levothyroxine (SYNTHROID) 112 MCG tablet Take 1 tablet (112 mcg total) by mouth daily. 30 tablet 11     MELATONIN ORAL Take by mouth.       metoprolol succinate (TOPROL-XL) 25 MG Take 1 tablet (25 mg total) by mouth daily. 90 tablet 3     rosuvastatin (CRESTOR) 5 MG tablet Take 1 tablet Monday Wednesday Friday 90 tablet 3     VIT A/VIT C/VIT E/ZINC/COPPER (ICAPS AREDS ORAL) Take by mouth.       buPROPion (WELLBUTRIN XL) 150 MG 24 hr tablet Take 1 tablet (150 mg total) by mouth at bedtime. 30 tablet 2     estrogens, conjugated, (PREMARIN) 0.45 MG tablet Take 1 tablet (0.45 mg total) by mouth daily. 30 tablet 11     Current Facility-Administered Medications   Medication Dose Route Frequency Provider Last Rate Last Dose     cyanocobalamin injection 1,000 mcg  1,000 mcg Intramuscular Q30 Days Theodore Moctezuma MD   1,000 mcg at 09/10/18 1030                      Perez Leroy MD  Internal medicine  [facility]  [phone]  [email or web address]    Much or all of the text in this note was generated through the  use of Dragon Dictate voice-to-text software. Errors in spelling or words which seem out of context are unintentional.   Sound alike errors, in particular, may have escaped editing.                 Subjective:   Chief Complaint:  Hypertension; Follow-up; Flu Vaccine; and B12 Injection    Doing well  No further syncope  No lightheadedness etc.  No tachyarrhythmias      She does sleep when she takes Klonopin Xanax.  She is doing that about once a week.    She is depressed and anxious.  Some social withdrawal.  Grief reaction  Prozac in the past with counseling  She had bad dreams on Prozac        Review of Systems:     Extensive 10-point review of systems was performed. Please see the HPI for problem specific pertinent review of systems.     Patient does note physically she is okay    Otherwise, the following systems are noncontributory including constitutional, eyes, ears, nose and throat, cardiovascular, respiratory, gastrointestinal, genitourinary, musculoskeletal,neurological, skin and/or breast, endocrine, hematologic/lymph, allergic/immunologic and psychiatric.              Medications:  Current Outpatient Prescriptions on File Prior to Visit   Medication Sig     cyanocobalamin (VITAMIN B-12) 1,000 mcg/mL injection Inject 1 mL (1,000 mcg total) into the shoulder, thigh, or buttocks every 30 (thirty) days.     ergocalciferol (ERGOCALCIFEROL) 50,000 unit capsule TAKE 1 CAPSULE BY MOUTH EVERY WEEK     ibuprofen (ADVIL,MOTRIN) 600 MG tablet Take 1 tablet (600 mg total) by mouth 3 (three) times a day.     LACTOBACILLUS RHAMNOSUS GG (CULTURELLE ORAL) Take by mouth.     lansoprazole (PREVACID) 30 MG capsule Take 1 capsule (30 mg total) by mouth daily with breakfast.     levothyroxine (SYNTHROID) 112 MCG tablet Take 1 tablet (112 mcg total) by mouth daily.     MELATONIN ORAL Take by mouth.     metoprolol succinate (TOPROL-XL) 25 MG Take 1 tablet (25 mg total) by mouth daily.     rosuvastatin (CRESTOR) 5 MG tablet Take 1  "tablet Monday Wednesday Friday     VIT A/VIT C/VIT E/ZINC/COPPER (ICAPS AREDS ORAL) Take by mouth.     [DISCONTINUED] clonazePAM (KLONOPIN) 0.5 MG tablet Take 1 tablet (0.5 mg total) by mouth at bedtime.     [DISCONTINUED] PREMARIN 0.3 mg tablet Take 1 tablet (0.3 mg total) by mouth 2 (two) times a day.     Current Facility-Administered Medications on File Prior to Visit   Medication     cyanocobalamin injection 1,000 mcg            Allergies:  Allergies   Allergen Reactions     Other Drug Allergy (See Comments)      Pt states she is allergic to all narcotics      Percocet [Oxycodone-Acetaminophen] Itching     Statins-Hmg-Coa Reductase Inhibitors Other (See Comments)     GI UPSET       Sulfa (Sulfonamide Antibiotics)      Vicodin [Hydrocodone-Acetaminophen] Itching     Zetia [Ezetimibe] Myalgia     Dyazide [Triamterene-Hydrochlorothiazid] Rash     Lisinopril Rash     Prilosec [Omeprazole] Rash       PSFHx: Tobacco Status:  She  reports that she has never smoked. She has never used smokeless tobacco.   Alcohol Status:    History   Alcohol Use     Yes     Comment: Occasional       reports that she has never smoked. She has never used smokeless tobacco. She reports that she drinks alcohol. Her drug history is not on file.    Objective:    /76 (Patient Site: Left Arm, Patient Position: Sitting, Cuff Size: Adult Regular)  Pulse 74  Ht 5' 7.25\" (1.708 m)  Wt 172 lb 0.6 oz (78 kg)  LMP  (LMP Unknown)  SpO2 98%  Breastfeeding? No  BMI 26.75 kg/m2  Weight:   Wt Readings from Last 3 Encounters:   10/24/18 172 lb 0.6 oz (78 kg)   09/10/18 166 lb 1.9 oz (75.4 kg)   08/08/18 172 lb (78 kg)     BP Readings from Last 10 Encounters:   10/24/18 128/76   09/10/18 134/76   08/08/18 144/66   05/11/18 124/76   03/21/18 110/60   01/05/18 126/80   12/01/17 138/84   09/01/17 114/62   08/10/17 110/58   06/19/17 112/70         General-appears well, no acute distress.  Pulses regular  Cardiac regular without murmur  No " edema      Review of clinical lab tests  Lab Results   Component Value Date    WBC 5.8 03/21/2018    HGB 13.2 03/21/2018    HCT 39.7 03/21/2018     03/21/2018    CHOL 288 (H) 12/01/2017    TRIG 156 (H) 12/01/2017    HDL 60 12/01/2017    ALT 26 05/11/2018    AST 27 05/11/2018     05/11/2018    K 4.2 05/11/2018     05/11/2018    CREATININE 0.63 05/11/2018    BUN 11 05/11/2018    CO2 27 05/11/2018    TSH 1.19 08/08/2018    HGBA1C 5.8 04/08/2016       Glucose   Date/Time Value Ref Range Status   05/11/2018 04:31 PM 96 70 - 125 mg/dL Final   05/05/2017 09:05  70 - 125 mg/dL Final   04/08/2016 08:53  74 - 125 mg/dL Final   04/26/2015 07:40  (H) 70 - 125 mg/dL Final     No results found for this or any previous visit (from the past 24 hour(s)).    RADIOLOGY: No results found.    Review of recent consultation-reviewed ER report with patient  She has slightly elevated lactate  Her liver function, sugar, kidney function unremarkable.Gordy

## 2021-06-22 NOTE — PROGRESS NOTES
arrived at clinic for Cyanocobalamin injection given Intramuscular at Right deltoid.      Injection was given without incidence.      Theresa MERRITT CMA/ARTIE....................9:08 AM

## 2021-06-24 NOTE — PROGRESS NOTES
arrived at clinic for Cyanocobalamin injection given Intramuscular at Right deltoid.      Injection was given without incidence.      Theresa MERRITT, RICHA/CMT....................1:59 PM

## 2021-06-24 NOTE — TELEPHONE ENCOUNTER
RN cannot approve Refill Request    RN can NOT refill this medication med is not covered by policy/route to provider. Last office visit: 1/16/2019 Perez Leroy MD Last Physical: 5/11/2018 Last MTM visit: Visit date not found Last visit same specialty: 1/16/2019 Perez Leroy MD.  Next visit within 3 mo: Visit date not found  Next physical within 3 mo: Visit date not found      Krystin Helms, [facility] Triage/Med Refill 3/9/2019    Requested Prescriptions   Pending Prescriptions Disp Refills     ergocalciferol (ERGOCALCIFEROL) 50,000 unit capsule [Pharmacy Med Name: Vitamin D (Ergocalciferol) Oral Capsule 37203 UNIT] 2 capsule 0     Sig: TAKE ONE CAPSULE BY MOUTH WEEKLY    There is no refill protocol information for this order

## 2021-06-25 NOTE — PROGRESS NOTES
Progress Notes by Perez Leroy MD at 6/19/2017  8:20 AM     Author: Perez Leroy MD Service: -- Author Type: Physician    Filed: 6/19/2017  2:16 PM Encounter Date: 6/19/2017 Status: Signed    : Perez Leroy MD (Physician)       OFFICE VISIT NOTE  Luba Nguyen   61 y.o. female            Assessment/Plan for  Luba Nguyen is a 61 y.o. female.  No Patient Care Coordination Note on file.       There are no diagnoses linked to this encounter.   1.  Supraventricular tachyarrhythmia with side effects from beta-blocker-fatigue and Cardizem-constipation edema.  Will change beta-blocker to 25 mg Exar twice daily and discontinue Cardizem due to significant side effects of constipation.  She does have an upcoming EP consultation with Dr. Yomaira Granda.  2 severe constipation-severe-exacerbated by Cardizem with rectal bleeding has had a recent colonoscopy.  3.  Rectal bleeding with recent colonoscopy.  Observe.  Anoscopy if persists.     Plan:  25 mg beta-blocker 1 twice daily  Reassurance regarding coronary status  Complete EP consultation  -Discussed other antiarrhythmics.  Discussed possible ablation procedures  Clinic visit 3 months  Constipation instructions-MiraLAX/Colace    Patient Instructions     Table of Contents for Results  CT CARDIAC CORONARY ARTERIES DUAL READ (06/09/2017 9:10 AM)  CREATININE,ISTAT (06/09/2017 7:54 AM)  EKG 12 LEAD (06/06/2017 9:34 AM)       CT CARDIAC CORONARY ARTERIES DUAL READ (06/09/2017 9:10 AM)  CT CARDIAC CORONARY ARTERIES DUAL READ (06/09/2017 9:10 AM)   Narrative   [facility]CORONARY CT ANGIOGRAM        Date: 6/9/2017    Indication: Chest pain with equivocal stress test, atrial tachycardia and     shortness of breath.        CONCLUSIONS:    1. Normal coronary arteries without detectable plaque or stenosis.  The     total coronary calcium score is zero.    2. Please review radiology portion for incidental noncardiac findings.         TECHNIQUE: ECG-gated CT angiogram of the heart with intravenous contrast     (Omnipaque 350, 110 mL at an injection rate of 6 mL/s) was performed on a     Siemens SOMATOM Definition Dual Source CT scanner. Coronary artery calcium     scoring was performed. The imaging protocol was individualized to minimize     radiation exposure. The following acquisition protocol was used for     coronary CT angiography: Retrospective. Pulse range 65-75 with min dosing.     Tube potential: 120 kV. Tube current: 5639 mAs. Total DLP: 585 mGy*cm. 8.1     mSv. CTDI: 49. Image post processing was performed on a [employer]     Vitrea Workstation. Images were reconstructed at a slice thickness of 0.6     mm with 0.3 mm overlap. The best image reconstructions were obtained at     73% of the cardiac cycle. The patient received the following medications:     30 mg metoprolol IV, 0.4 mg SL nitroglycerin and usual home medications     including metoprolol and diltiazem by mouth. There were no immediate     complications.         STUDY QUALITY:  Excellent.        FINDINGS        CT WITHOUT CONTRAST        CORONARY CALCIUM SCORE FINDINGS: The total Agatston calcium score is zero.     No coronary calcium is detected.         GENERAL RECOMMENDATIONS: Adoption and maintenance of a healthy lifestyle     is recommended for all people. This should include regular, appropriate     exercise and observance of a proper diet, to ensure balanced nutrition and     weight control. Tobacco use should be avoided. Cholesterol has been linked     to coronary atherosclerosis; thus, we strongly encourage adherence to     current cholesterol treatment guidelines. However note that these are     general recommendations only, and as with all such matters, the personal     physician should be consulted regarding recommendations appropriate for     the individual.        Please review radiology portion of incidental noncardiac findings.        CT WITH CONTRAST         DOMINANCE:  Right.        LEFT MAIN: The left main arises normally from the left coronary cusp and     is widely patent without any detectable stenosis or plaque.        LEFT ANTERIOR DESCENDING: The left anterior descending and its major     diagonal branches are widely patent without any detectable stenosis or     plaque.        LEFT CIRCUMFLEX: The left circumflex and its major branches are widely     patent without any detectable stenosis or plaque.        RIGHT CORONARY ARTERY: The right coronary artery and its major branches     are widely patent without any detectable stenosis or plaque.        ADDITIONAL FINDINGS: Normal pulmonary venous anatomy with all pulmonary     veins draining into the left atrium. No left atrial or left ventricular     mass or thrombus. Tricuspid aortic valve. Normal sized aortic root. Normal     caliber of the visualized proximal ascending aorta. Normal pericardial     thickness. No pericardial effusion. Proximal pulmonary arteries are well     opacified. Left atrial appendage is well-visualized and is free of     thrombus. Radiology review for incidental noncardiac findings is as noted     below.        Ale Prakash M.D.    [facility] [place]    EJT/lak    D:6/9/2017  T:6/9/2017            OVER READ: DETAILED [facility]TR[facility] OVER READ OF CARDIAC     CT     6/9/2017 9:10 AM        INDICATION: Cardiac - Chest Pain With Equivocal Stress Test Atrial     Tachycardia (HCC) SOB (shortness of breath).    TECHNIQUE: Dose reduction techniques were used.    COMPARISON: None.        FINDINGS:      LIMITED CHEST: Negative.    LIMITED MEDIASTINUM: Negative.    LIMITED UPPER ABDOMEN: Negative.        CONCLUSION:      No significant incidental extracardiac findings.    Please refer to cardiologist's dictation for the cardiac CT report.                   This provider spent greater than 40 min. face-to-face time with the patient and/or his family.  More than  half this time was spent in counseling and or coordination of care with other providers or agencies which were consistent with the nature of this patient's problems which are listed and described in the assessment and plan.      Perez Leroy MD  Internal medicine  [facility]  [phone]  [email or web address]      This is an electronically verified report by Perez Leroy M.D.  (Note created with Dragon voice recognition and unintended spelling errors and word substitutions may occur)               Subjective:   Chief Complaint:  Rectal Bleeding (2 BM's) and Medication Questions    Patient has had some rectal bleeding.  She was very severely constipated.  She was recently started on Cardizem.  She is taken some milk of magnesia and is on stool softener.  She is normally constipated.  She did just have a recent colonoscopy a couple of months ago.    She has had a PAT.  She has seen Dr. Hugo, she has seen [place] cardiologist and has an upcoming appointment with [facility]-Dr. Yomaira Granda.  She has been tired with 50 mg beta-blocker and constipated and having edema on the diltiazem.  She continues to have palpitations.  No lightheadedness syncope chest pain.    She did just recently had a CTA.  This was negative for any obstructive disease    Review of Systems:     Extensive 10-point review of systems was performed. Please see the HPI for problem specific pertinent review of systems.     Patient does note patient is anxious    Otherwise, the following systems are noncontributory including constitutional, eyes, ears, nose and throat, cardiovascular, respiratory, gastrointestinal, genitourinary, musculoskeletal,neurological, skin and/or breast, endocrine, hematologic/lymph, allergic/immunologic and psychiatric.              Medications:  Current Outpatient Prescriptions   Medication Sig Dispense Refill   ? ALPRAZolam (XANAX) 0.25 MG tablet Take 1 tablet (0.25 mg total) by  mouth as needed for sleep or anxiety (PRN WHILE TRAVELING). 30 tablet 0   ? aspirin 81 MG EC tablet Take 1 tablet (81 mg total) by mouth daily. 150 tablet 2   ? cyanocobalamin (VITAMIN B-12) 1,000 mcg/mL injection Inject 1 mL (1,000 mcg total) into the shoulder, thigh, or buttocks every 30 (thirty) days. 10 mL 1   ? diltiazem (CARDIZEM) 60 MG tablet Take 60 mg by mouth.     ? docusate sodium (COLACE) 100 MG capsule Take 1 capsule (100 mg total) by mouth daily as needed for constipation. 30 capsule 2   ? estrogens, conjugated, (PREMARIN) 0.45 MG tablet Take 1 tablet (0.45 mg total) by mouth daily. 90 tablet 3   ? ibuprofen (ADVIL,MOTRIN) 800 MG tablet Take 1 tablet (800 mg total) by mouth every 8 (eight) hours as needed for pain. 60 tablet 2   ? LACTOBACILLUS RHAMNOSUS GG (CULTURELLE ORAL) Take by mouth.     ? lansoprazole (PREVACID) 30 MG capsule Take 30 mg by mouth daily with breakfast.     ? levothyroxine (SYNTHROID) 125 MCG tablet Take 1 tablet (125 mcg total) by mouth Daily at 6:00 am. (Patient taking differently: Take 125 mcg by mouth Daily at 6:00 am. ) 90 tablet 3   ? MELATONIN ORAL Take by mouth.     ? metoprolol succinate (TOPROL-XL) 50 MG 24 hr tablet Take 1 tablet (50 mg total) by mouth 2 (two) times a day. (Patient taking differently: Take 50 mg by mouth daily. ) 60 tablet 11   ? VIT A/VIT C/VIT E/ZINC/COPPER (ICAPS AREDS ORAL) Take by mouth.     ? VITAMIN D2 50,000 unit capsule TAKE 1 CAPSULE BY MOUTH EVERY WEEK 26 capsule 2   ? cyanocobalamin, vitamin B-12, 1,000 mcg/mL Kit Inject 1,000 mcg as directed every 7 days. 1 kit 1     Current Facility-Administered Medications   Medication Dose Route Frequency Provider Last Rate Last Dose   ? cyanocobalamin injection 1,000 mcg  1,000 mcg Intramuscular Q30 Days Theodore Moctezuma MD   1,000 mcg at 06/06/17 1131     Current Outpatient Prescriptions on File Prior to Visit   Medication Sig   ? ALPRAZolam (XANAX) 0.25 MG tablet Take 1 tablet (0.25 mg total) by mouth  as needed for sleep or anxiety (PRN WHILE TRAVELING).   ? aspirin 81 MG EC tablet Take 1 tablet (81 mg total) by mouth daily.   ? cyanocobalamin (VITAMIN B-12) 1,000 mcg/mL injection Inject 1 mL (1,000 mcg total) into the shoulder, thigh, or buttocks every 30 (thirty) days.   ? docusate sodium (COLACE) 100 MG capsule Take 1 capsule (100 mg total) by mouth daily as needed for constipation.   ? estrogens, conjugated, (PREMARIN) 0.45 MG tablet Take 1 tablet (0.45 mg total) by mouth daily.   ? ibuprofen (ADVIL,MOTRIN) 800 MG tablet Take 1 tablet (800 mg total) by mouth every 8 (eight) hours as needed for pain.   ? LACTOBACILLUS RHAMNOSUS GG (CULTURELLE ORAL) Take by mouth.   ? lansoprazole (PREVACID) 30 MG capsule Take 30 mg by mouth daily with breakfast.   ? levothyroxine (SYNTHROID) 125 MCG tablet Take 1 tablet (125 mcg total) by mouth Daily at 6:00 am. (Patient taking differently: Take 125 mcg by mouth Daily at 6:00 am. )   ? MELATONIN ORAL Take by mouth.   ? metoprolol succinate (TOPROL-XL) 50 MG 24 hr tablet Take 1 tablet (50 mg total) by mouth 2 (two) times a day. (Patient taking differently: Take 50 mg by mouth daily. )   ? VIT A/VIT C/VIT E/ZINC/COPPER (ICAPS AREDS ORAL) Take by mouth.   ? VITAMIN D2 50,000 unit capsule TAKE 1 CAPSULE BY MOUTH EVERY WEEK   ? cyanocobalamin, vitamin B-12, 1,000 mcg/mL Kit Inject 1,000 mcg as directed every 7 days.     Current Facility-Administered Medications on File Prior to Visit   Medication   ? cyanocobalamin injection 1,000 mcg       Allergies:  Allergies   Allergen Reactions   ? Other Drug Allergy (See Comments)      Pt states she is allergic to all narcotics    ? Percocet [Oxycodone-Acetaminophen] Itching   ? Statins-Hmg-Coa Reductase Inhibitors Other (See Comments)     GI UPSET     ? Sulfa (Sulfonamide Antibiotics)    ? Vicodin [Hydrocodone-Acetaminophen] Itching   ? Zetia [Ezetimibe] Myalgia   ? Dyazide [Triamterene-Hydrochlorothiazid] Rash   ? Lisinopril Rash   ?  "Prilosec [Omeprazole] Rash       PSFHx: Tobacco Status:  She  reports that she has never smoked. She has never used smokeless tobacco.   Alcohol Status:    History   Alcohol Use   ? Yes     Comment: Occasional       reports that she has never smoked. She has never used smokeless tobacco. She reports that she drinks alcohol. Her drug history is not on file.    Objective:    /70 (Patient Site: Left Arm, Patient Position: Sitting, Cuff Size: Adult Regular)  Pulse 63  Ht 5' 7.25\" (1.708 m)  Wt 171 lb 0.6 oz (77.6 kg)  LMP  (LMP Unknown)  SpO2 97%  Breastfeeding? No  BMI 26.59 kg/m2  Weight:   Wt Readings from Last 3 Encounters:   06/19/17 171 lb 0.6 oz (77.6 kg)   05/19/17 175 lb (79.4 kg)   05/08/17 173 lb (78.5 kg)     BP Readings from Last 3 Encounters:   06/19/17 112/70   05/19/17 112/72   05/08/17 114/74         General-appears well, no acute distress.  Pulses regular with occasional bursts of what feel like a PAT.  She recently had an ECG at [place].  She recently had a Holter monitor-reviewed  Lungs clear  Cardiac without murmur  Trace edema      Review of clinical lab tests  Lab Results   Component Value Date    WBC 6.5 05/05/2017    HGB 14.1 05/05/2017    HCT 42.1 05/05/2017     05/05/2017    CHOL 245 (H) 05/05/2017    TRIG 223 (H) 05/05/2017    HDL 53 05/05/2017     05/05/2017    K 4.2 05/05/2017     05/05/2017    CREATININE 0.67 05/05/2017    BUN 10 05/05/2017    CO2 25 05/05/2017    TSH 0.67 05/05/2017    HGBA1C 5.8 04/08/2016       Glucose   Date/Time Value Ref Range Status   05/05/2017 09:05  70 - 125 mg/dL Final   04/08/2016 08:53  74 - 125 mg/dL Final   04/26/2015 07:40  (H) 70 - 125 mg/dL Final     No results found for this or any previous visit (from the past 24 hour(s)).    RADIOLOGY: No results found.    Review of recent consultation--[facility]-Dr. Bentley--change to diltiazem.  Table of Contents for Results  CT CARDIAC CORONARY ARTERIES DUAL READ " (06/09/2017 9:10 AM)  CREATININE,ISTAT (06/09/2017 7:54 AM)  EKG 12 LEAD (06/06/2017 9:34 AM)       CT CARDIAC CORONARY ARTERIES DUAL READ (06/09/2017 9:10 AM)  CT CARDIAC CORONARY ARTERIES DUAL READ (06/09/2017 9:10 AM)   [place]   [facility]----CORONARY CT ANGIOGRAM        Date: 6/9/2017    Indication: Chest pain with equivocal stress test, atrial tachycardia and     shortness of breath.        CONCLUSIONS:    1. Normal coronary arteries without detectable plaque or stenosis.  The     total coronary calcium score is zero.    2. Please review radiology portion for incidental noncardiac findings.        TECHNIQUE: ECG-gated CT angiogram of the heart with intravenous contrast     (Omnipaque 350, 110 mL at an injection rate of 6 mL/s) was performed on a     Siem[employer] SOMATOM Definition Dual Source CT scanner. Coronary artery calcium     scoring was performed. The imaging protocol was individualized to minimize     radiation exposure. The following acquisition protocol was used for     coronary CT angiography: Retrospective. Pulse range 65-75 with min dosing.     Tube potential: 120 kV. Tube current: 5639 mAs. Total DLP: 585 mGy*cm. 8.1     mSv. CTDI: 49. Image post processing was performed on a [employer]     Vitrea Workstation. Images were reconstructed at a slice thickness of 0.6     mm with 0.3 mm overlap. The best image reconstructions were obtained at     73% of the cardiac cycle. The patient received the following medications:     30 mg metoprolol IV, 0.4 mg SL nitroglycerin and usual home medications     including metoprolol and diltiazem by mouth. There were no immediate     complications.         STUDY QUALITY:  Excellent.        FINDINGS        CT WITHOUT CONTRAST        CORONARY CALCIUM SCORE FINDINGS: The total Agatston calcium score is zero.     No coronary calcium is detected.         GENERAL RECOMMENDATIONS: Adoption and maintenance of a healthy lifestyle     is recommended for all  people. This should include regular, appropriate     exercise and observance of a proper diet, to ensure balanced nutrition and     weight control. Tobacco use should be avoided. Cholesterol has been linked     to coronary atherosclerosis; thus, we strongly encourage adherence to     current cholesterol treatment guidelines. However note that these are     general recommendations only, and as with all such matters, the personal     physician should be consulted regarding recommendations appropriate for     the individual.        Please review radiology portion of incidental noncardiac findings.        CT WITH CONTRAST        DOMINANCE:  Right.        LEFT MAIN: The left main arises normally from the left coronary cusp and     is widely patent without any detectable stenosis or plaque.        LEFT ANTERIOR DESCENDING: The left anterior descending and its major     diagonal branches are widely patent without any detectable stenosis or     plaque.        LEFT CIRCUMFLEX: The left circumflex and its major branches are widely     patent without any detectable stenosis or plaque.        RIGHT CORONARY ARTERY: The right coronary artery and its major branches     are widely patent without any detectable stenosis or plaque.        ADDITIONAL FINDINGS: Normal pulmonary venous anatomy with all pulmonary     veins draining into the left atrium. No left atrial or left ventricular     mass or thrombus. Tricuspid aortic valve. Normal sized aortic root. Normal     caliber of the visualized proximal ascending aorta. Normal pericardial     thickness. No pericardial effusion. Proximal pulmonary arteries are well     opacified. Left atrial appendage is well-visualized and is free of     thrombus. Radiology review for incidental noncardiac findings is as noted     below.        Ale Prakash M.D.    [facility]    МАРИЯ/gerardo    D:6/9/2017  T:6/9/2017            OVER READ: DETAILED [facility]TR[facility]  OVER READ OF CARDIAC     CT     6/9/2017 9:10 AM        INDICATION: Cardiac - Chest Pain With Equivocal Stress Test Atrial     Tachycardia (HCC) SOB (shortness of breath).    TECHNIQUE: Dose reduction techniques were used.    COMPARISON: None.        FINDINGS:      LIMITED CHEST: Negative.    LIMITED MEDIASTINUM: Negative.    LIMITED UPPER ABDOMEN: Negative.        CONCLUSION:      No significant incidental extracardiac findings.    Please refer to cardiologist's dictation for the cardiac CT report.           EKG 12 LEAD6/6/2017  [facility]  Component Name Value Ref Range   Interpretation Normal sinus rhythm  Normal ECG     Ventricular Rate 67 BPM    Atrial Rate 67 BPM    P-R Interval 182 ms   QRS Duration 90 ms    ms   QTc 445 ms   P Axis 74 degrees    R Axis -9 degrees    T Axis 41 degrees        EKG Scan    Scan on: 5/15/17 2:00 PM by:   Indications   Ischemic chest pain   Heart palpitations   Essential hypertension   Hyperlipidemia   Interpretation Summary     No previous study for comparison.    The stress electrocardiogram was non-diagnostic for ischemia due to left bundle branch block.    The patient's exercise tolerance was mildly impaired.    Left ventricle ejection fraction is normal. The estimated left ventricular ejection fraction is 60%.    Stress echocardiogram is negative for inducible ischemia.    Multiple bursts of paroxysmal atrial fibrillation seen both at rest and during exercise at heart rate of 190 bpm.                HOLTER MONITOR      IMPRESSION:  1.  A 24-hour Holter monitor was applied 05/15/2017 with date of  analysis/interpretation 05/17/2017.  2.  Sinus rhythm is present.  There is a mild [place] prolongation.  About 50% of the  time the QRS is narrow and about 50% of the time there is a left bundle branch  block.  Left bundle branch block seems to be related to higher heart rates and the  narrow QRS was seen at lower heart rates.  3.  No paroxysmal  bradycardia or high grade heart block.  4.  Probably no ventricular ectopy.  5.  Very frequent bursts of supraventricular tachycardia/atrial tachycardia.  It  appears the patient has a primary atrial tachycardia that conducts with a left  bundle branch block pattern.  During this tachycardia, heart rates could vary  considerably anywhere from 110 up to 188 beats per minute.  Mainly the episodes are  fairly short lived, but 1 episode lasted about 90 seconds.  6.  Sensation of palpitations and flutters correlate to the atrial dysrhythmia.       DISCUSSION:  Very frequent episodes of atrial arrhythmia and atrial tachycardia.   This is a supraventricular tachycardia pattern and likely represents a primary  atrial tachycardia and is a long RP tachycardia and the P waves have rather similar  morphology to the sinus beats, suggesting the atrial tachycardia origin may be near  the sinus node.  I do not believe this represents a reentrant type pattern because  of the P-wave appearance.        LAKEISHA ZULETA 05/18/2017 10:11:40  T 05/18/2017 10:53:15  R 05/18/2017 10:53:15  [id number]

## 2021-06-25 NOTE — PROGRESS NOTES
Progress Notes by Cornelio Hugo DO at 5/8/2017  1:20 PM     Author: Cornelio Hugo DO Service: -- Author Type: Physician    Filed: 5/8/2017  2:12 PM Encounter Date: 5/8/2017 Status: Signed    : Cornelio Hugo DO (Physician)           Click to link to [facility]     [facility] HEART CARE NOTE    Thank you, Dr. Leroy, for asking the [facility] team to see Ms. Luba Nguyen to evaluate arrythmia.      Assessment/Recommendations   Assessment:    1. Frequent palpitations.  ECG appears to be sinus with frequent PACs.  Appears to have a rate related left bundle.     2. Exertional dyspnea  3. Mild chest pressure    Plan:  1. Exercise stress echo  2. 24 hour holter monitor  3. Continue metoprolol   4.  She may require further testing including longer duration of cardiac monitoring and other testing based on stress results.        History of Present Illness    Ms. Luba Nguyen is a 61 y.o. female who presents in cardiology clinic rapid access for recent development of exertional dyspnea, palpitations and lightheadedness.   Patient states over the past 2-3 weeks she is experience intermittent episodes of palpitations and new dyspnea on exertion.  She is recently hiking in the mountains and developed shortness of breath which was new for her.  Shortness of breath was associated with mild chest pressure.  She did undergo EGD and colonoscopy on April 24, 2017 and afterwards she developed worsening exertional dyspnea and palpitations.   She recently followed up with Dr. Leroy during his evaluation in the clinic had an EKG which demonstrated sinus rhythm with frequent premature atrial contractions with what appears to be a rate related left bundle branch block.  It is possible this could represent a nonsustained ventricular tachycardia but based on the morphology and irregularity I think this is more likely frequent PACs with rate related left bundle branch  block.  She has no previous history of cardiac disease.  She had a coronary calcium score in 2013 which was 0 from [facility] which was reviewed.  Patient denies any syncopal episodes.  Heart rate today is very regular with no ectopic beats.    No family history of premature coronary disease.  No family history of sudden cardiac death.  No family history of recurrent syncope.    ECHO (pending):        Physical Examination Review of Systems   Vitals:    05/08/17 1320   BP: 114/74   Pulse: 72   Resp: 16     Body mass index is 26.89 kg/(m^2).  Wt Readings from Last 3 Encounters:   05/08/17 173 lb (78.5 kg)   05/05/17 175 lb (79.4 kg)   01/04/17 166 lb 1.3 oz (75.3 kg)       General Appearance:   no distress, normal body habitus   ENT/Mouth: membranes moist, no oral lesions or bleeding gums.      EYES:  no scleral icterus, normal conjunctivae   Neck: no carotid bruits or thyromegaly   Chest/Lungs:   lungs are clear to auscultation, no rales or wheezing, no sternal scar, equal chest wall expansion    Cardiovascular:   Regular. Normal first and second heart sounds with no murmurs, rubs, or gallops; the carotid, radial and posterior tibial pulses are intact, Jugular venous pressure normal, no edema bilaterally    Abdomen:  no organomegaly, masses, bruits, or tenderness; bowel sounds are present   Extremities: no cyanosis or clubbing   Skin: no xanthelasma, warm.    Neurologic: normal gait, normal  bilateral, no tremors     Psychiatric: alert and oriented x3, calm     General: WNL  Eyes: WNL  Ears/Nose/Throat: WNL  Lungs: WNL  Stomach: WNL  Bladder: WNL  Muscle/Joints: WNL  Skin: WNL  Nervous System: WNL  Mental Health: WNL     Blood: WNL   Heart: see above.      Medical History  Surgical History Family History Social History   Past Medical History:   Diagnosis Date   ? Adenomatous colon polyp     2016   ? 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 Benign positional vertigo    ? Depression     DISTANT EARLY 40'S   ? Facial  paralysis 2007    RESOLVED   ? GERD (gastroesophageal reflux disease)     CHRONIC   ? History of herpes zoster    ? Hyperglycemia     maternal hyperglycemia   ? Hyperlipidemia    ? Hypertension    ? Hypothyroidism    ? Low serum vitamin D    ? Low vitamin B12 level    ? Osteopenia     -1.40=[id number]   ? Peripheral neuropathy 2011    Past Surgical History:   Procedure Laterality Date   ? CHOLECYSTECTOMY     ? DILATION AND CURETTAGE OF UTERUS      LAPAROSCOPIC TRANSVAGINAL   ? TOTAL ABDOMINAL HYSTERECTOMY W/ BILATERAL SALPINGOOPHORECTOMY  2009    Family History   Problem Relation Age of Onset   ? Hypertension Mother    ? Stroke Mother    ? Anemia Mother    ? Dementia Father    ? Heart failure Father      HEART DISEASE   ? Breast cancer Maternal Aunt     Social History     Social History   ? Marital status:      Spouse name: N/A   ? Number of children: N/A   ? Years of education: N/A     Occupational History   ? Not on file.     Social History Main Topics   ? Smoking status: Never Smoker   ? Smokeless tobacco: Not on file   ? Alcohol use Yes      Comment: Occasional   ? Drug use: Not on file   ? Sexual activity: Not on file     Other Topics Concern   ? 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 aspirin 81 MG EC tablet Take 1 tablet (81 mg total) by mouth daily. 150 tablet 2   ? cyanocobalamin (VITAMIN B-12) 1,000 mcg/mL injection Inject 1 mL (1,000 mcg total) into the shoulder, thigh, or buttocks every 30 (thirty) days. 10 mL 1   ? docusate sodium (COLACE) 100 MG capsule Take 1 capsule (100 mg total) by mouth daily as needed for constipation. 30 capsule 2   ? estrogens, conjugated, (PREMARIN) 0.45 MG tablet Take 1 tablet (0.45 mg total) by mouth daily. 90 tablet 3   ? ibuprofen  (ADVIL,MOTRIN) 800 MG tablet Take 1 tablet (800 mg total) by mouth every 8 (eight) hours as needed for pain. 60 tablet 2   ? LACTOBACILLUS RHAMNOSUS GG (CULTURELLE ORAL) Take by mouth.     ? lansoprazole (PREVACID) 30 MG capsule Take 30 mg by mouth daily with breakfast.     ? levothyroxine (SYNTHROID) 125 MCG tablet Take 1 tablet (125 mcg total) by mouth Daily at 6:00 am. (Patient taking differently: Take 125 mcg by mouth Daily at 6:00 am. ) 90 tablet 3   ? MELATONIN ORAL Take by mouth.     ? metoprolol succinate (TOPROL XL) 50 MG 24 hr tablet Take 1 tablet (50 mg total) by mouth daily. 30 tablet 11   ? VIT A/VIT C/VIT E/ZINC/COPPER (ICAPS AREDS ORAL) Take by mouth.     ? VITAMIN D2 50,000 unit capsule TAKE 1 CAPSULE BY MOUTH EVERY WEEK 26 capsule 2   ? ALPRAZolam (XANAX) 0.25 MG tablet Take 1 tablet (0.25 mg total) by mouth as needed for sleep or anxiety (PRN WHILE TRAVELING). 30 tablet 0   ? cyanocobalamin, vitamin B-12, 1,000 mcg/mL Kit Inject 1,000 mcg as directed every 7 days. 1 kit 1     Current Facility-Administered Medications   Medication Dose Route Frequency Provider Last Rate Last Dose   ? cyanocobalamin injection 1,000 mcg  1,000 mcg Intramuscular Q30 Days Theodore Moctezuma MD   1,000 mcg at 05/05/17 0810      Allergies   Allergen Reactions   ? Other Drug Allergy (See Comments)      Pt states she is allergic to all narcotics    ? Percocet [Oxycodone-Acetaminophen] Itching   ? Statins-Hmg-Coa Reductase Inhibitors Other (See Comments)     GI UPSET     ? Sulfa (Sulfonamide Antibiotics)    ? Vicodin [Hydrocodone-Acetaminophen] Itching   ? Zetia [Ezetimibe] Myalgia   ? Dyazide [Triamterene-Hydrochlorothiazid] Rash   ? Lisinopril Rash   ? Prilosec [Omeprazole] Rash         Lab Results    Chemistry/lipid CBC Cardiac Enzymes/BNP/TSH/INR   Lab Results   Component Value Date    CHOL 245 (H) 05/05/2017    HDL 53 05/05/2017    LDLCALC 147 (H) 05/05/2017    TRIG 223 (H) 05/05/2017    CREATININE 0.67 05/05/2017    BUN  10 05/05/2017    K 4.2 05/05/2017     05/05/2017     05/05/2017    CO2 25 05/05/2017    Lab Results   Component Value Date    WBC 6.5 05/05/2017    HGB 14.1 05/05/2017    HCT 42.1 05/05/2017    MCV 89 05/05/2017     05/05/2017    Lab Results   Component Value Date    TROPONINI <0.01 04/26/2015     (H) 05/05/2017    TSH 0.67 05/05/2017

## 2021-06-27 NOTE — PROGRESS NOTES
Progress Notes by Perez Leroy MD at 1/16/2019  3:00 PM     Author: Perez Leroy MD Service: -- Author Type: Physician    Filed: 1/16/2019  4:42 PM Encounter Date: 1/16/2019 Status: Signed    : Perez Leroy MD (Physician)       OFFICE VISIT NOTE  Luba Nguyen   63 y.o. female            Assessment/Plan for  Luba Nguyen is a 63 y.o. female.  No Patient Care Coordination Note on file.       There are no diagnoses linked to this encounter.   1.  Occasional tachycardia in patient with history of atrial dysrhythmia.  Her thyroid is normal.  She has only occasional alcohol.  She is much less anxious.  Will follow.  No active intervention at this time.  With associated arm discomfort given her normal CT angiogram and stress test some not unduly concerned however if this is the last long time would proceed to ER.  There is always a possibility of spasm  Other option would be to try some sublingual nitroglycerin.  2.  Normal coronary arteries  3.  Recent anxiety/grief-resolving.  She is actually doing quite well.  Off medication using rare as needed Xanax  4.  Euthyroid- thyroid replacement  5    postmenopausal-on estrogen replacement  6.  TIA.  Still with some symptoms.  Recheck UA after Cipro    Plan:  UA  Recheck lactate-elevated at ER 8/2018-patient request.  This should be normal  Annual exam  Notify me any issues or any ongoing arm discomforts  Did talk about EKG monitoring apps with smart phone    Patient Instructions     CT CARDIAC CORONARY ARTERIES DUAL READ6/9/2017  [facility]  Result Narrative   [facility]----CORONARY CT ANGIOGRAM    Date: 6/9/2017  Indication: Chest pain with equivocal stress test, atrial tachycardia and   shortness of breath.    CONCLUSIONS:  1. Normal coronary arteries without detectable plaque or stenosis.  The   total coronary calcium score is zero.  2. Please review radiology portion for incidental noncardiac  findings.    TECHNIQUE: ECG-gated CT angiogram of the heart with intravenous contrast   (Omnipaque 350, 110 mL at an injection rate of 6 mL/s) was performed on a   Siemens SOMATOM Definition Dual Source CT scanner. Coronary artery calcium   scoring was performed. The imaging protocol was individualized to minimize   radiation exposure. The following acquisition protocol was used for   coronary CT angiography: Retrospective. Pulse range 65-75 with min dosing.   Tube potential: 120 kV. Tube current: 5639 mAs. Total DLP: 585 mGy*cm. 8.1   mSv. CTDI: 49. Image post processing was performed on a [employer]   [employer] Workstation. Images were reconstructed at a slice thickness of 0.6   mm with 0.3 mm overlap. The best image reconstructions were obtained at   73% of the cardiac cycle. The patient received the following medications:   30 mg metoprolol IV, 0.4 mg SL nitroglycerin and usual home medications   including metoprolol and diltiazem by mouth. There were no immediate   complications.     STUDY QUALITY:  Excellent.    FINDINGS    CT WITHOUT CONTRAST    CORONARY CALCIUM SCORE FINDINGS: The total Agatston calcium score is zero.   No coronary calcium is detected.     GENERAL RECOMMENDATIONS: Adoption and maintenance of a healthy lifestyle   is recommended for all people. This should include regular, appropriate   exercise and observance of a proper diet, to ensure balanced nutrition and   weight control. Tobacco use should be avoided. Cholesterol has been linked   to coronary atherosclerosis; thus, we strongly encourage adherence to   current cholesterol treatment guidelines. However note that these are   general recommendations only, and as with all such matters, the personal   physician should be consulted regarding recommendations appropriate for   the individual.    Please review radiology portion of incidental noncardiac findings.    CT WITH CONTRAST    DOMINANCE:  Right.    LEFT MAIN: The left main arises normally  from the left coronary cusp and   is widely patent without any detectable stenosis or plaque.    LEFT ANTERIOR DESCENDING: The left anterior descending and its major   diagonal branches are widely patent without any detectable stenosis or   plaque.    LEFT CIRCUMFLEX: The left circumflex and its major branches are widely   patent without any detectable stenosis or plaque.    RIGHT CORONARY ARTERY: The right coronary artery and its major branches   are widely patent without any detectable stenosis or plaque.    ADDITIONAL FINDINGS: Normal pulmonary venous anatomy with all pulmonary   veins draining into the left atrium. No left atrial or left ventricular   mass or thrombus. Tricuspid aortic valve. Normal sized aortic root. Normal   caliber of the visualized proximal ascending aorta. Normal pericardial   thickness. No pericardial effusion. Proximal pulmonary arteries are well   opacified. Left atrial appendage is well-visualized and is free of   thrombus. Radiology review for incidental noncardiac findings is as noted   below.    Ale Prakash M.D.  [facility]  МАРИЯ/gerardo  D:6/9/2017  T:6/9/2017      OVER READ: DETAILED [facility] EXTRACARDIAC OVER READ OF CARDIAC   CT   6/9/2017 9:10 AM    INDICATION: Cardiac - Chest Pain With Equivocal Stress Test Atrial   Tachycardia (HCC) SOB (shortness of breath).  TECHNIQUE: Dose reduction techniques were used.  COMPARISON: None.    FINDINGS:    LIMITED CHEST: Negative.  LIMITED MEDIASTINUM: Negative.  LIMITED UPPER ABDOMEN: Negative.    CONCLUSION:    No significant incidental extracardiac findings.  Please refer to cardiologist's dictation for the cardiac CT report           There are no diagnoses linked to this encounter.    Medications after visit  Current Outpatient Medications   Medication Sig Dispense Refill   ? ALPRAZolam (XANAX) 0.25 MG tablet Take 1 tablet (0.25 mg total) by mouth at bedtime as needed for anxiety. 90 tablet 1   ?  cyanocobalamin (VITAMIN B-12) 1,000 mcg/mL injection Inject 1 mL (1,000 mcg total) into the shoulder, thigh, or buttocks every 30 (thirty) days. 10 mL 1   ? ergocalciferol (ERGOCALCIFEROL) 50,000 unit capsule TAKE 1 CAPSULE BY MOUTH EVERY WEEK 26 capsule 0   ? estrogens, conjugated, (PREMARIN) 0.45 MG tablet Take 1 tablet (0.45 mg total) by mouth daily. 30 tablet 11   ? ibuprofen (ADVIL,MOTRIN) 600 MG tablet Take 1 tablet (600 mg total) by mouth 3 (three) times a day. 30 tablet 2   ? LACTOBACILLUS RHAMNOSUS GG (CULTURELLE ORAL) Take by mouth.     ? lansoprazole (PREVACID) 30 MG capsule Take 1 capsule (30 mg total) by mouth daily with breakfast. 90 capsule 3   ? levothyroxine (SYNTHROID) 112 MCG tablet Take 1 tablet (112 mcg total) by mouth daily. 30 tablet 11   ? MELATONIN ORAL Take by mouth.     ? rosuvastatin (CRESTOR) 5 MG tablet Take 1 tablet Monday Wednesday Friday 90 tablet 3   ? valACYclovir (VALTREX) 1000 MG tablet 1,000 mg 2 (two) times a day. X 1 day for cold sores           ? VIT A/VIT C/VIT E/ZINC/COPPER (ICAPS AREDS ORAL) Take by mouth.       Current Facility-Administered Medications   Medication Dose Route Frequency Provider Last Rate Last Dose   ? cyanocobalamin injection 1,000 mcg  1,000 mcg Intramuscular Q30 Days Theodore Moctezuma MD   1,000 mcg at 01/16/19 1502                      Perez Leroy MD  Internal medicine  [facility]  [phone]  [email or web address]    Much or all of the text in this note was generated through the use of Dragon Dictate voice-to-text software. Errors in spelling or words which seem out of context are unintentional.   Sound alike errors, in particular, may have escaped editing.                 Subjective:   Chief Complaint:  Hypertension; Follow-up (Routine 3m visit); and B12 Injection    Patient is doing well    Is off Wellbutrin  She takes a rare as needed Xanax  She will be going on a vacation for 2 weeks in [place]  Her  appetite is good    Grief resolving.  Family dynamics lessening      Review of Systems:     Extensive 10-point review of systems was performed. Please see the HPI for problem specific pertinent review of systems.     Patient does note her appetite is good  Cipro did help her UTI that I gave her in December but she still having mild symptomatology.    Otherwise, the following systems are noncontributory including constitutional, eyes, ears, nose and throat, cardiovascular, respiratory, gastrointestinal, genitourinary, musculoskeletal,neurological, skin and/or breast, endocrine, hematologic/lymph, allergic/immunologic and psychiatric.              Medications:  Current Outpatient Medications on File Prior to Visit   Medication Sig   ? ALPRAZolam (XANAX) 0.25 MG tablet Take 1 tablet (0.25 mg total) by mouth at bedtime as needed for anxiety.   ? cyanocobalamin (VITAMIN B-12) 1,000 mcg/mL injection Inject 1 mL (1,000 mcg total) into the shoulder, thigh, or buttocks every 30 (thirty) days.   ? ergocalciferol (ERGOCALCIFEROL) 50,000 unit capsule TAKE 1 CAPSULE BY MOUTH EVERY WEEK   ? estrogens, conjugated, (PREMARIN) 0.45 MG tablet Take 1 tablet (0.45 mg total) by mouth daily.   ? ibuprofen (ADVIL,MOTRIN) 600 MG tablet Take 1 tablet (600 mg total) by mouth 3 (three) times a day.   ? LACTOBACILLUS RHAMNOSUS GG (CULTURELLE ORAL) Take by mouth.   ? lansoprazole (PREVACID) 30 MG capsule Take 1 capsule (30 mg total) by mouth daily with breakfast.   ? levothyroxine (SYNTHROID) 112 MCG tablet Take 1 tablet (112 mcg total) by mouth daily.   ? MELATONIN ORAL Take by mouth.   ? rosuvastatin (CRESTOR) 5 MG tablet Take 1 tablet Monday Wednesday Friday   ? valACYclovir (VALTREX) 1000 MG tablet 1,000 mg 2 (two) times a day. X 1 day for cold sores         ? VIT A/VIT C/VIT E/ZINC/COPPER (ICAPS AREDS ORAL) Take by mouth.   ? [DISCONTINUED] buPROPion (WELLBUTRIN XL) 150 MG 24 hr tablet Take 1 tablet (150 mg total) by mouth at bedtime.   ?  "[DISCONTINUED] metoprolol succinate (TOPROL-XL) 25 MG Take 1 tablet (25 mg total) by mouth daily.     Current Facility-Administered Medications on File Prior to Visit   Medication   ? cyanocobalamin injection 1,000 mcg            Allergies:  Allergies   Allergen Reactions   ? Other Drug Allergy (See Comments)      Pt states she is allergic to all narcotics    ? Percocet [Oxycodone-Acetaminophen] Itching   ? Statins-Hmg-Coa Reductase Inhibitors Other (See Comments)     GI UPSET     ? Sulfa (Sulfonamide Antibiotics)    ? Vicodin [Hydrocodone-Acetaminophen] Itching   ? Zetia [Ezetimibe] Myalgia   ? Dyazide [Triamterene-Hydrochlorothiazid] Rash   ? Lisinopril Rash   ? Prilosec [Omeprazole] Rash       PSFHx: Tobacco Status:  She  reports that  has never smoked. she has never used smokeless tobacco.   Alcohol Status:    Social History     Substance and Sexual Activity   Alcohol Use Yes    Comment: Occasional       reports that  has never smoked. she has never used smokeless tobacco. She reports that she drinks alcohol. Her drug history is not on file.    Objective:    /84 (Patient Site: Right Arm, Patient Position: Sitting, Cuff Size: Adult Regular)   Pulse 76   Ht 5' 7.25\" (1.708 m)   Wt 171 lb 1.3 oz (77.6 kg)   LMP  (LMP Unknown)   SpO2 97%   Breastfeeding? No   BMI 26.60 kg/m    Weight:   Wt Readings from Last 3 Encounters:   01/16/19 171 lb 1.3 oz (77.6 kg)   10/24/18 172 lb 0.6 oz (78 kg)   09/10/18 166 lb 1.9 oz (75.4 kg)     BP Readings from Last 10 Encounters:   01/16/19 126/84   10/24/18 128/76   09/10/18 134/76   08/08/18 144/66   05/11/18 124/76   03/21/18 110/60   01/05/18 126/80   12/01/17 138/84   09/01/17 114/62   08/10/17 110/58         General-appears well, no acute distress.  Pulses regular throughout  I looked on her phone apps  She has occasional spells where her heart rate will double  She has a known atrial arrhythmia  Lungs are clear  Cardiac is regular without murmur  No " ectopy    Reviewed CT angios and stress test with patient      Review of clinical lab tests  Lab Results   Component Value Date    WBC 5.8 03/21/2018    HGB 13.2 03/21/2018    HCT 39.7 03/21/2018     03/21/2018    CHOL 288 (H) 12/01/2017    TRIG 156 (H) 12/01/2017    HDL 60 12/01/2017    ALT 26 05/11/2018    AST 27 05/11/2018     05/11/2018    K 4.2 05/11/2018     05/11/2018    CREATININE 0.63 05/11/2018    BUN 11 05/11/2018    CO2 27 05/11/2018    TSH 1.19 08/08/2018    HGBA1C 5.8 04/08/2016       Glucose   Date/Time Value Ref Range Status   05/11/2018 04:31 PM 96 70 - 125 mg/dL Final   05/05/2017 09:05  70 - 125 mg/dL Final   04/08/2016 08:53  74 - 125 mg/dL Final   04/26/2015 07:40  (H) 70 - 125 mg/dL Final     No results found for this or any previous visit (from the past 24 hour(s)).    RADIOLOGY: No results found.    Review of recent consultation-

## 2021-07-04 DIAGNOSIS — E06.3 HASHIMOTO'S THYROIDITIS: ICD-10-CM

## 2021-07-05 RX ORDER — LEVOTHYROXINE SODIUM 125 MCG
TABLET ORAL
Qty: 30 TABLET | Refills: 0 | Status: SHIPPED | OUTPATIENT
Start: 2021-07-05 | End: 2021-08-18

## 2021-07-05 NOTE — TELEPHONE ENCOUNTER
Medication is being filled for 1 time refill only due to:  will be due for an appt and labs - this is for synthroid.  The pt should still have meds until August 2021.  Pt has an upcoming appt scheduled.

## 2021-07-07 ENCOUNTER — COMMUNICATION - HEALTHEAST (OUTPATIENT)
Dept: INTERNAL MEDICINE | Facility: CLINIC | Age: 66
End: 2021-07-07

## 2021-07-07 DIAGNOSIS — E78.5 HYPERLIPIDEMIA: ICD-10-CM

## 2021-07-07 NOTE — TELEPHONE ENCOUNTER
Telephone Encounter by Dae Bragg RN at 7/7/2021 12:43 PM     Author: Dae Bragg RN Service: -- Author Type: Registered Nurse    Filed: 7/7/2021 12:44 PM Encounter Date: 7/7/2021 Status: Signed    : Dae Bragg RN (Registered Nurse)       Former patient of Ballesteros & has not established care with another provider.  Please assign refill request to covering provider per Clinic standard process.      RN cannot approve Refill Request    RN can NOT refill this medication no pcp. Last office visit: 10/31/2019 Perez Leroy MD Last Physical: 7/25/2019 Last MTM visit: Visit date not found Last visit same specialty: 7/8/2020 Jose Martin Albrecht MD.  Next visit within 3 mo: Visit date not found  Next physical within 3 mo: Visit date not found      Frank Callahan Connection Triage/Med Refill 7/7/2021    Requested Prescriptions   Pending Prescriptions Disp Refills   ? rosuvastatin (CRESTOR) 5 MG tablet [Pharmacy Med Name: ROSUVASTATIN CALCIUM 5 MG TAB] 36 tablet 3     Sig: TAKE 1 TABLET BY MOUTH ON MONDAY, WEDNESDAY, AND FRIDAY       Statins Refill Protocol (Hmg CoA Reductase Inhibitors) Passed - 7/7/2021 12:20 PM        Passed - PCP or prescribing provider visit in past 12 months      Last office visit with prescriber/PCP: 10/31/2019 Perez Leroy MD OR same dept: 7/8/2020 Jose Martin Albrecht MD OR same specialty: 7/8/2020 Jose Martin Albrecht MD  Last physical: 7/25/2019 Last MTM visit: Visit date not found   Next visit within 3 mo: Visit date not found  Next physical within 3 mo: Visit date not found  Prescriber OR PCP: Perez Leroy MD  Last diagnosis associated with med order: 1. Hyperlipidemia  - rosuvastatin (CRESTOR) 5 MG tablet [Pharmacy Med Name: ROSUVASTATIN CALCIUM 5 MG TAB]; TAKE 1 TABLET BY MOUTH ON MONDAY, WEDNESDAY, AND FRIDAY  Dispense: 36 tablet; Refill: 3    If protocol passes may refill for 12 months if within 3 months of last provider visit (or a total of 15 months).

## 2021-07-08 ENCOUNTER — MYC MEDICAL ADVICE (OUTPATIENT)
Dept: FAMILY MEDICINE | Facility: CLINIC | Age: 66
End: 2021-07-08

## 2021-07-08 DIAGNOSIS — E78.5 HYPERLIPIDEMIA LDL GOAL <100: ICD-10-CM

## 2021-07-08 NOTE — TELEPHONE ENCOUNTER
Routing refill request to provider for review/approval because:  Medication is reported/historical.    See patient message. LOV 4/15/2021.    LDL Cholesterol Calculated   Date Value Ref Range Status   08/18/2020 113 (H) <100 mg/dL Final     Comment:     Above desirable:  100-129 mg/dl  Borderline High:  130-159 mg/dL  High:             160-189 mg/dL  Very high:       >189 mg/dl       Martita Lin RN

## 2021-07-13 ENCOUNTER — ALLIED HEALTH/NURSE VISIT (OUTPATIENT)
Dept: NURSING | Facility: CLINIC | Age: 66
End: 2021-07-13
Payer: MEDICARE

## 2021-07-13 DIAGNOSIS — E53.8 VITAMIN B12 DEFICIENCY (NON ANEMIC): Primary | ICD-10-CM

## 2021-07-13 PROCEDURE — 96372 THER/PROPH/DIAG INJ SC/IM: CPT | Performed by: INTERNAL MEDICINE

## 2021-07-13 PROCEDURE — 99207 PR NO CHARGE NURSE ONLY: CPT

## 2021-07-13 RX ORDER — ROSUVASTATIN CALCIUM 5 MG/1
5 TABLET, COATED ORAL
Qty: 30 TABLET | Refills: 6 | Status: SHIPPED | OUTPATIENT
Start: 2021-07-13 | End: 2022-08-01

## 2021-07-13 RX ADMIN — CYANOCOBALAMIN 1000 MCG: 1000 INJECTION, SOLUTION INTRAMUSCULAR; SUBCUTANEOUS at 09:06

## 2021-07-13 NOTE — PROGRESS NOTES
Clinic Administered Medication Documentation    Administrations This Visit     cyanocobalamin injection 1,000 mcg     Admin Date  07/13/2021 Action  Given Dose  1,000 mcg Route  Intramuscular Site  Left Deltoid Administered By  Dana Burroughs CMA    Ordering Provider: Mirela Ballesteros MD    NDC: 10581-607-82    Lot#:     : [id number]    Patient Supplied?: No                  Injectable Medication Documentation    Patient was given Cyanocobalamin (B-12). Prior to medication administration, verified patients identity using patient s name and date of birth. Please see MAR and medication order for additional information. Patient instructed to remain in clinic for 15 minutes.      Was entire vial of medication used? Yes  Vial/Syringe: Single dose vial  Expiration Date:  3/31/2023  Was this medication supplied by the patient? No     Dana Burroughs MA

## 2021-08-18 ENCOUNTER — OFFICE VISIT (OUTPATIENT)
Dept: FAMILY MEDICINE | Facility: CLINIC | Age: 66
End: 2021-08-18
Payer: MEDICARE

## 2021-08-18 VITALS
WEIGHT: 175.2 LBS | BODY MASS INDEX: 27.5 KG/M2 | DIASTOLIC BLOOD PRESSURE: 80 MMHG | HEART RATE: 72 BPM | RESPIRATION RATE: 12 BRPM | SYSTOLIC BLOOD PRESSURE: 112 MMHG | TEMPERATURE: 98.4 F | HEIGHT: 67 IN | OXYGEN SATURATION: 97 %

## 2021-08-18 DIAGNOSIS — K22.70 BARRETT'S ESOPHAGUS WITHOUT DYSPLASIA: ICD-10-CM

## 2021-08-18 DIAGNOSIS — E53.8 VITAMIN B12 DEFICIENCY (NON ANEMIC): ICD-10-CM

## 2021-08-18 DIAGNOSIS — Z79.899 CURRENT USE OF PROTON PUMP INHIBITOR: ICD-10-CM

## 2021-08-18 DIAGNOSIS — I47.19 ATRIAL TACHYCARDIA (H): ICD-10-CM

## 2021-08-18 DIAGNOSIS — R73.09 ELEVATED HEMOGLOBIN A1C: ICD-10-CM

## 2021-08-18 DIAGNOSIS — I20.9 ISCHEMIC CHEST PAIN (H): ICD-10-CM

## 2021-08-18 DIAGNOSIS — E06.3 HASHIMOTO'S THYROIDITIS: ICD-10-CM

## 2021-08-18 DIAGNOSIS — E78.5 HYPERLIPIDEMIA LDL GOAL <100: Primary | ICD-10-CM

## 2021-08-18 LAB
ALBUMIN SERPL-MCNC: 3.8 G/DL (ref 3.4–5)
ALP SERPL-CCNC: 62 U/L (ref 40–150)
ALT SERPL W P-5'-P-CCNC: 35 U/L (ref 0–50)
ANION GAP SERPL CALCULATED.3IONS-SCNC: 5 MMOL/L (ref 3–14)
AST SERPL W P-5'-P-CCNC: 27 U/L (ref 0–45)
BILIRUB SERPL-MCNC: 0.4 MG/DL (ref 0.2–1.3)
BUN SERPL-MCNC: 16 MG/DL (ref 7–30)
CALCIUM SERPL-MCNC: 9.3 MG/DL (ref 8.5–10.1)
CHLORIDE BLD-SCNC: 105 MMOL/L (ref 94–109)
CHOLEST SERPL-MCNC: 203 MG/DL
CO2 SERPL-SCNC: 26 MMOL/L (ref 20–32)
CREAT SERPL-MCNC: 0.68 MG/DL (ref 0.52–1.04)
DEPRECATED CALCIDIOL+CALCIFEROL SERPL-MC: 64 UG/L (ref 20–75)
FASTING STATUS PATIENT QL REPORTED: YES
GFR SERPL CREATININE-BSD FRML MDRD: >90 ML/MIN/1.73M2
GLUCOSE BLD-MCNC: 117 MG/DL (ref 70–99)
HBA1C MFR BLD: 5.9 % (ref 0–5.6)
HDLC SERPL-MCNC: 62 MG/DL
LDLC SERPL CALC-MCNC: 114 MG/DL
NONHDLC SERPL-MCNC: 141 MG/DL
POTASSIUM BLD-SCNC: 4.9 MMOL/L (ref 3.4–5.3)
PROT SERPL-MCNC: 7.2 G/DL (ref 6.8–8.8)
SODIUM SERPL-SCNC: 136 MMOL/L (ref 133–144)
TRIGL SERPL-MCNC: 135 MG/DL
TSH SERPL DL<=0.005 MIU/L-ACNC: 0.44 MU/L (ref 0.4–4)
VIT B12 SERPL-MCNC: 536 PG/ML (ref 193–986)

## 2021-08-18 PROCEDURE — 99215 OFFICE O/P EST HI 40 MIN: CPT | Mod: 25 | Performed by: INTERNAL MEDICINE

## 2021-08-18 PROCEDURE — 80053 COMPREHEN METABOLIC PANEL: CPT | Performed by: INTERNAL MEDICINE

## 2021-08-18 PROCEDURE — 82306 VITAMIN D 25 HYDROXY: CPT | Performed by: INTERNAL MEDICINE

## 2021-08-18 PROCEDURE — 83036 HEMOGLOBIN GLYCOSYLATED A1C: CPT | Performed by: INTERNAL MEDICINE

## 2021-08-18 PROCEDURE — 82607 VITAMIN B-12: CPT | Performed by: INTERNAL MEDICINE

## 2021-08-18 PROCEDURE — 84443 ASSAY THYROID STIM HORMONE: CPT | Performed by: INTERNAL MEDICINE

## 2021-08-18 PROCEDURE — 36415 COLL VENOUS BLD VENIPUNCTURE: CPT | Performed by: INTERNAL MEDICINE

## 2021-08-18 PROCEDURE — 96372 THER/PROPH/DIAG INJ SC/IM: CPT | Performed by: INTERNAL MEDICINE

## 2021-08-18 PROCEDURE — 80061 LIPID PANEL: CPT | Performed by: INTERNAL MEDICINE

## 2021-08-18 RX ORDER — LEVOTHYROXINE SODIUM 125 MCG
125 TABLET ORAL DAILY
Qty: 90 TABLET | Refills: 4 | Status: SHIPPED | OUTPATIENT
Start: 2021-08-18 | End: 2022-08-01

## 2021-08-18 RX ORDER — PANTOPRAZOLE SODIUM 40 MG/1
40 TABLET, DELAYED RELEASE ORAL DAILY
Qty: 90 TABLET | Refills: 4 | Status: SHIPPED | OUTPATIENT
Start: 2021-08-18 | End: 2022-08-01

## 2021-08-18 RX ADMIN — CYANOCOBALAMIN 1000 MCG: 1000 INJECTION, SOLUTION INTRAMUSCULAR; SUBCUTANEOUS at 10:12

## 2021-08-18 ASSESSMENT — PAIN SCALES - GENERAL: PAINLEVEL: MODERATE PAIN (4)

## 2021-08-18 ASSESSMENT — MIFFLIN-ST. JEOR: SCORE: 1368.36

## 2021-08-18 NOTE — PROGRESS NOTES
Assessment & Plan     (E78.5) Hyperlipidemia LDL goal <100  (primary encounter diagnosis)  Comment: Rosuvastatin use reviewed; statin use reviewed; well tolerated; goals of therapy reviewed.  Plan: Comprehensive metabolic panel, Lipid panel         reflex to direct LDL Fasting          (I47.1) Atrial tachycardia (H)  Comment: sees cardiology; no ablation done; using BB intermittently now but  was taken daily in [id number]; BB and HR OK  Plan: consider resuming daily use of BB    (E06.3) Hashimoto's thyroiditis  Comment: Clinically euthyroid; labs today, consider dose adjustment if labs warrant  Plan: SYNTHROID 125 MCG tablet, TSH with free T4 reflex          (E53.8) Vitamin B12 deficiency (non anemic)  Comment: B12 replacement; monthly injection  Plan: Vitamin B12        Check lab, continue monthly injection.     (K22.70) Cueto's esophagus without dysplasia  Comment: Dx 1990, EGD every 3 years; last done in 2020; Pantoprazole ( PPI)  Plan: pantoprazole (PROTONIX) 40 MG EC tablet        EGD due 2023    (Z79.899) Current use of proton pump inhibitor  Comment: daily use of PPI due to Cueto's Esophagus  Plan: Vitamin B12, Vitamin D Deficiency        Pt reminded of benefits of maximizing calcium and vit D to help with bone health (due to being on PPI)    (R73.09) Elevated hemoglobin A1c  Comment: 5.7-6.0 range. no hx of Metformin; pt prefers not to use meds.   Plan: Hemoglobin A1c          (I20.9) Ischemic chest pain (H)  Comment: chart reviewed; sees cardiology once per year; hx of atrial tachycardia  Plan: reviewed    Review of the result(s) of each unique test - reviewed medications, lab results, plan of care  Ordering of each unique test  Prescription drug management  47 minutes spent on the date of the encounter doing chart review, history and exam, documentation and further activities per the note       MEDICATIONS:   Orders Placed This Encounter   Medications     SYNTHROID 125 MCG tablet     Sig: Take 1  tablet (125 mcg) by mouth daily     Dispense:  90 tablet     Refill:  4     pantoprazole (PROTONIX) 40 MG EC tablet     Sig: Take 1 tablet (40 mg) by mouth daily     Dispense:  90 tablet     Refill:  4          - Continue other medications without change  FUTURE LABS:       - Schedule a fasting blood draw - orders placed.     Return in about 8 months (around 4/15/2022) for Wellness visit, cholesterol, B12, Thyroid, etc...    Mirela Ballesteros MD  Internal Medicine   [facility] JEIMY Toney is a 65 year old who presents for the following health issues     HPI     Hyperlipidemia Follow-Up      Are you regularly taking any medication or supplement to lower your cholesterol?   Yes- Rosuvastatin    Are you having muscle aches or other side effects that you think could be caused by your cholesterol lowering medication?  No   Recent Labs   Lab Test 08/18/20  0917 07/25/19  1210   CHOL 195 184   HDL 51 59   * 101   TRIG 155* 122       Hypothyroidism Follow-up      Since last visit, patient describes the following symptoms: constipation and hair loss  Lab Results   Component Value Date    TSH 0.65 08/18/2020    TSH 0.10 07/08/2020    TSH 0.10 07/08/2020             How many servings of fruits and vegetables do you eat daily?  2-3    On average, how many sweetened beverages do you drink each day (Examples: soda, juice, sweet tea, etc.  Do NOT count diet or artificially sweetened beverages)?   0    How many days per week do you exercise enough to make your heart beat faster? 3 or less    How many minutes a day do you exercise enough to make your heart beat faster? 30 - 60    How many days per week do you miss taking your medication? 0    Patient also needs to get her B-12 injection. She is currently due for injection    Review of Systems   CONSTITUTIONAL: NEGATIVE for fever, chills, change in weight  ENT/MOUTH: NEGATIVE for ear, mouth and throat problems  RESP:NEGATIVE  "for significant cough or SOB  CV: NEGATIVE for chest pain, palpitations or peripheral edema and hx atrial tachycardia; reviewed medications.   GI: hx of Cueto's esophagus- PPI use reviewed.  MUSCULOSKELETAL: NEGATIVE for significant arthralgias or myalgia  NEURO: NEGATIVE for weakness, dizziness or paresthesias  ENDOCRINE: B12 replacement, hypothyroid; Rosuvastatin to lower cholesterol.   HEME/ALLERGY/IMMUNE: B12 supplementation  PSYCHIATRIC: NEGATIVE for changes in mood or affect      Objective    /80 (BP Location: Right arm, Patient Position: Sitting, Cuff Size: Adult Regular)   Pulse 72   Temp 98.4  F (36.9  C) (Oral)   Resp 12   Ht 1.695 m (5' 6.75\")   Wt 79.5 kg (175 lb 3.2 oz)   SpO2 97%   BMI 27.65 kg/m    Body mass index is 27.65 kg/m .  Physical Exam   GENERAL: healthy, alert and no distress  NECK: no adenopathy, no asymmetry, masses, or scars and thyroid normal to palpation  RESP: lungs clear to auscultation - no rales, rhonchi or wheezes  CV: regular rate and rhythm, normal S1 S2, no S3 or S4, no murmur, click or rub, no peripheral edema and peripheral pulses strong  ABDOMEN: soft, nontender and bowel sounds normal  MS: no gross musculoskeletal defects noted, no edema  NEURO: Normal strength and tone, mentation intact and speech normal  PSYCH: mentation appears normal and affect normal/bright            "

## 2021-08-18 NOTE — PATIENT INSTRUCTIONS
Pt went to lab  Needs B12 injection; please meet her in lab  could use nurse only room? Or have her come back.

## 2021-09-17 ENCOUNTER — ALLIED HEALTH/NURSE VISIT (OUTPATIENT)
Dept: FAMILY MEDICINE | Facility: CLINIC | Age: 66
End: 2021-09-17
Payer: MEDICARE

## 2021-09-17 DIAGNOSIS — E53.8 VITAMIN B12 DEFICIENCY (NON ANEMIC): Primary | ICD-10-CM

## 2021-09-17 PROCEDURE — 99207 PR NO CHARGE NURSE ONLY: CPT

## 2021-09-17 PROCEDURE — 96372 THER/PROPH/DIAG INJ SC/IM: CPT | Performed by: INTERNAL MEDICINE

## 2021-09-17 RX ADMIN — CYANOCOBALAMIN 1000 MCG: 1000 INJECTION, SOLUTION INTRAMUSCULAR; SUBCUTANEOUS at 10:33

## 2021-09-17 NOTE — PROGRESS NOTES
Clinic Administered Medication Documentation    Administrations This Visit     cyanocobalamin injection 1,000 mcg     Admin Date  09/17/2021 Action  Given Dose  1,000 mcg Route  Intramuscular Site   Administered By  Dana Burroughs CMA    Ordering Provider: Mirela Ballesteros MD    Patient Supplied?: No                  Injectable Medication Documentation    Patient was given Cyanocobalamin (B-12). Prior to medication administration, verified patients identity using patient s name and date of birth. Please see MAR and medication order for additional information. Patient instructed to remain in clinic for 15 minutes.      Was entire vial of medication used? Yes  Vial/Syringe: Single dose vial  Expiration Date:  3/31/21  Was this medication supplied by the patient? No

## 2021-09-26 ENCOUNTER — HEALTH MAINTENANCE LETTER (OUTPATIENT)
Age: 66
End: 2021-09-26

## 2021-10-14 ENCOUNTER — ALLIED HEALTH/NURSE VISIT (OUTPATIENT)
Dept: FAMILY MEDICINE | Facility: CLINIC | Age: 66
End: 2021-10-14
Payer: MEDICARE

## 2021-10-14 DIAGNOSIS — E53.8 VITAMIN B12 DEFICIENCY (NON ANEMIC): Primary | ICD-10-CM

## 2021-10-14 PROCEDURE — 99207 PR NO CHARGE NURSE ONLY: CPT

## 2021-10-14 PROCEDURE — 96372 THER/PROPH/DIAG INJ SC/IM: CPT | Performed by: INTERNAL MEDICINE

## 2021-10-14 RX ADMIN — CYANOCOBALAMIN 1000 MCG: 1000 INJECTION, SOLUTION INTRAMUSCULAR; SUBCUTANEOUS at 10:05

## 2021-10-14 NOTE — PROGRESS NOTES
Clinic Administered Medication Documentation    Administrations This Visit     cyanocobalamin injection 1,000 mcg     Admin Date  10/14/2021 Action  Given Dose  1,000 mcg Route  Intramuscular Site  Right Deltoid Administered By  Layla Delgado MA    Ordering Provider: Mirela Ballesteros MD    Patient Supplied?: No                  Injectable Medication Documentation    Patient was given Cyanocobalamin (B-12). Prior to medication administration, verified patients identity using patient s name and date of birth. Please see MAR and medication order for additional information. Patient instructed to report any adverse reaction to staff immediately .      Was entire vial of medication used? Yes  Vial/Syringe: Single dose vial  Expiration Date:  03/31/23  Was this medication supplied by the patient? No     Layla Delgado MA

## 2021-11-15 ENCOUNTER — ALLIED HEALTH/NURSE VISIT (OUTPATIENT)
Dept: FAMILY MEDICINE | Facility: CLINIC | Age: 66
End: 2021-11-15
Payer: MEDICARE

## 2021-11-15 DIAGNOSIS — R79.89 LOW VITAMIN B12 LEVEL: Primary | ICD-10-CM

## 2021-11-15 PROCEDURE — 99207 PR NO CHARGE NURSE ONLY: CPT

## 2021-11-15 PROCEDURE — 96372 THER/PROPH/DIAG INJ SC/IM: CPT | Performed by: INTERNAL MEDICINE

## 2021-11-15 RX ADMIN — CYANOCOBALAMIN 1000 MCG: 1000 INJECTION, SOLUTION INTRAMUSCULAR; SUBCUTANEOUS at 10:23

## 2021-11-22 NOTE — NURSING NOTE
21  Anahi Davila Masters : 1990 Sex: female  Age: 32 y.o. Patient was referred by Gina Pearl MD    Chief Complaint   Patient presents with   Manisha Yu    Foot Pain     right foot had xrays done and steriod khushboo which did not help        SUBJECTIVE this patient is seen for follow-up of right foot pain. Today she is still experiencing pain is specifically standing at work. HPI  Review of Systems  Const: Denies constitutional symptoms  Musculo: Denies symptoms other than stated above  Skin: Denies symptoms other than stated above       Current Outpatient Medications:     carvedilol (COREG) 25 MG tablet, TAKE 1 TABLET BY MOUTH TWO TIMES A DAY, Disp: , Rfl:     losartan (COZAAR) 100 MG tablet, Take 100 mg by mouth daily, Disp: , Rfl:     hydroCHLOROthiazide (HYDRODIURIL) 25 MG tablet, Take 25 mg by mouth daily, Disp: , Rfl:     etodolac (LODINE) 400 MG tablet, Take 1 tablet by mouth 2 times daily, Disp: 180 tablet, Rfl: 1  No Known Allergies    Past Medical History:   Diagnosis Date    Diet controlled gestational diabetes mellitus (GDM), antepartum 2021    Hypertension     PCOS (polycystic ovarian syndrome)      Past Surgical History:   Procedure Laterality Date    APPENDECTOMY  2013     SECTION N/A 2021     SECTION performed by Scott Bautista MD at [facility]     No family history on file.   Social History     Socioeconomic History    Marital status:      Spouse name: Not on file    Number of children: Not on file    Years of education: Not on file    Highest education level: Not on file   Occupational History    Not on file   Tobacco Use    Smoking status: Never Smoker    Smokeless tobacco: Never Used   Vaping Use    Vaping Use: Never used   Substance and Sexual Activity    Alcohol use: Not Currently    Drug use: Never    Sexual activity: Not on file   Other Topics Concern    Not on file   Social History Narrative    Not Clinic Administered Medication Documentation      Injectable Medication Documentation    Patient was given Cyanocobalamin (B-12). Prior to medication administration, verified patients identity using patient s name and date of birth. Please see MAR and medication order for additional information. Patient instructed to report any adverse reaction to staff immediately .      Was entire vial of medication used? Yes  Vial/Syringe: Single dose vial  Expiration Date:  09/2021  Was this medication supplied by the patient? No   on file     Social Determinants of Health     Financial Resource Strain:     Difficulty of Paying Living Expenses: Not on file   Food Insecurity:     Worried About Running Out of Food in the Last Year: Not on file    Riki of Food in the Last Year: Not on file   Transportation Needs:     Lack of Transportation (Medical): Not on file    Lack of Transportation (Non-Medical):  Not on file   Physical Activity:     Days of Exercise per Week: Not on file    Minutes of Exercise per Session: Not on file   Stress:     Feeling of Stress : Not on file   Social Connections:     Frequency of Communication with Friends and Family: Not on file    Frequency of Social Gatherings with Friends and Family: Not on file    Attends [employer] Services: Not on file    Active Member of Clubs or Organizations: Not on file    Attends Club or Organization Meetings: Not on file    Marital Status: Not on file   Intimate Partner Violence:     Fear of Current or Ex-Partner: Not on file    Emotionally Abused: Not on file    Physically Abused: Not on file    Sexually Abused: Not on file   Housing Stability:     Unable to Pay for Housing in the Last Year: Not on file    Number of [place] in the Last Year: Not on file    Unstable Housing in the Last Year: Not on file       Vitals:    11/22/21 1544   BP: (!) 144/84   Temp: 97.2 °F (36.2 °C)   TempSrc: Temporal   Weight: (!) 330 lb (149.7 kg)       Focused Lower Extremity Physical Exam:  Vitals:    11/22/21 1544   BP: (!) 144/84   Temp: 97.2 °F (36.2 °C)        Foot Exam     Ortho Exam    Vascular: pulses  dp  pt    Capillary Refill Time:   Hair growth  Skin:    Edema:    Neurologic:      Musculoskeletal/ Orthopedic examination:    NAIL   Web space  Derm: Pain with palpation to the cuboid region of the right foot pain with palpation negative pain inversion eversion dorsiflexion plantarflexion    EXAMINATION:   THREE XRAY VIEWS OF THE RIGHT FOOT       10/22/2021 4:16 pm     COMPARISON:   None.       HISTORY:   ORDERING SYSTEM PROVIDED HISTORY: Pain in joint of right foot   TECHNOLOGIST PROVIDED HISTORY:   Reason for exam:->fracture       FINDINGS:   There is no evidence of acute fracture.  There is normal alignment of the   tarsometatarsal joints.  No acute joint abnormality.  No focal osseous   lesion. No focal soft tissue abnormality.           Impression   No acute osseous abnormality.             Order History    Open Order Details         Assessment and Plan:Cam Walker  Signed informed consent was obtained from the patient. Patient applied the cam walker to the affected limb. This device fit well. Patient was given written and verbal instructions regarding this cam walker. This is medically necessary to help reduce pain and promote and expedite healing. Wojciech was seen today for established new doctor and foot pain. Diagnoses and all orders for this visit:    Contusion of right foot, subsequent encounter  -     XR FOOT RIGHT (MIN 3 VIEWS); Future    Other orders  -     etodolac (LODINE) 400 MG tablet; Take 1 tablet by mouth 2 times daily        No follow-ups on file. Seen By:  Chase Bower DPM      Document was created using voice recognition software. Note was reviewed, however may contain grammatical errors.

## 2021-11-23 ENCOUNTER — HOSPITAL ENCOUNTER (OUTPATIENT)
Dept: MAMMOGRAPHY | Facility: CLINIC | Age: 66
Discharge: HOME | End: 2021-11-23
Attending: INTERNAL MEDICINE | Admitting: INTERNAL MEDICINE
Payer: MEDICARE

## 2021-11-23 DIAGNOSIS — Z12.31 VISIT FOR SCREENING MAMMOGRAM: ICD-10-CM

## 2021-11-23 PROCEDURE — 77063 BREAST TOMOSYNTHESIS BI: CPT

## 2021-12-10 ENCOUNTER — TRANSFERRED RECORDS (OUTPATIENT)
Dept: HEALTH INFORMATION MANAGEMENT | Facility: CLINIC | Age: 66
End: 2021-12-10
Payer: MEDICARE

## 2021-12-15 ENCOUNTER — ALLIED HEALTH/NURSE VISIT (OUTPATIENT)
Dept: FAMILY MEDICINE | Facility: CLINIC | Age: 66
End: 2021-12-15
Payer: MEDICARE

## 2021-12-15 DIAGNOSIS — E53.8 VITAMIN B12 DEFICIENCY (NON ANEMIC): Primary | ICD-10-CM

## 2021-12-15 PROCEDURE — 96372 THER/PROPH/DIAG INJ SC/IM: CPT | Performed by: INTERNAL MEDICINE

## 2021-12-15 PROCEDURE — 99207 PR NO CHARGE NURSE ONLY: CPT

## 2021-12-15 RX ADMIN — CYANOCOBALAMIN 1000 MCG: 1000 INJECTION, SOLUTION INTRAMUSCULAR; SUBCUTANEOUS at 09:11

## 2021-12-15 NOTE — PROGRESS NOTES
Clinic Administered Medication Documentation          Injectable Medication Documentation    Patient was given Cyanocobalamin (B-12). Prior to medication administration, verified patients identity using patient s name and date of birth. Please see MAR and medication order for additional information. Patient instructed to report any adverse reaction to staff immediately .      Was entire vial of medication used? Yes  Vial/Syringe: Single dose vial  Expiration Date:  12/2022  Was this medication supplied by the patient? No

## 2021-12-17 ENCOUNTER — TRANSFERRED RECORDS (OUTPATIENT)
Dept: HEALTH INFORMATION MANAGEMENT | Facility: CLINIC | Age: 66
End: 2021-12-17
Payer: MEDICARE

## 2021-12-30 ENCOUNTER — TELEPHONE (OUTPATIENT)
Dept: FAMILY MEDICINE | Facility: CLINIC | Age: 66
End: 2021-12-30
Payer: MEDICARE

## 2022-02-14 ENCOUNTER — ALLIED HEALTH/NURSE VISIT (OUTPATIENT)
Dept: FAMILY MEDICINE | Facility: CLINIC | Age: 67
End: 2022-02-14
Payer: MEDICARE

## 2022-02-14 DIAGNOSIS — E53.8 VITAMIN B12 DEFICIENCY (NON ANEMIC): Primary | ICD-10-CM

## 2022-02-14 PROCEDURE — 99207 PR NO CHARGE NURSE ONLY: CPT

## 2022-02-14 PROCEDURE — 96372 THER/PROPH/DIAG INJ SC/IM: CPT | Performed by: INTERNAL MEDICINE

## 2022-02-14 RX ADMIN — CYANOCOBALAMIN 1000 MCG: 1000 INJECTION, SOLUTION INTRAMUSCULAR; SUBCUTANEOUS at 09:19

## 2022-02-19 ENCOUNTER — HOSPITAL ENCOUNTER (EMERGENCY)
Facility: CLINIC | Age: 67
Discharge: HOME | End: 2022-02-19
Attending: EMERGENCY MEDICINE | Admitting: EMERGENCY MEDICINE
Payer: MEDICARE

## 2022-02-19 ENCOUNTER — APPOINTMENT (OUTPATIENT)
Dept: GENERAL RADIOLOGY | Facility: CLINIC | Age: 67
End: 2022-02-19
Attending: EMERGENCY MEDICINE
Payer: MEDICARE

## 2022-02-19 ENCOUNTER — TRANSFERRED RECORDS (OUTPATIENT)
Dept: HEALTH INFORMATION MANAGEMENT | Facility: CLINIC | Age: 67
End: 2022-02-19

## 2022-02-19 VITALS
TEMPERATURE: 97.5 F | RESPIRATION RATE: 18 BRPM | OXYGEN SATURATION: 96 % | SYSTOLIC BLOOD PRESSURE: 155 MMHG | DIASTOLIC BLOOD PRESSURE: 84 MMHG | HEART RATE: 68 BPM

## 2022-02-19 DIAGNOSIS — R07.9 LEFT-SIDED CHEST PAIN: ICD-10-CM

## 2022-02-19 LAB
ALBUMIN SERPL-MCNC: 4 G/DL (ref 3.4–5)
ALP SERPL-CCNC: 66 U/L (ref 40–150)
ALT SERPL W P-5'-P-CCNC: 34 U/L (ref 0–50)
ANION GAP SERPL CALCULATED.3IONS-SCNC: 4 MMOL/L (ref 3–14)
AST SERPL W P-5'-P-CCNC: 28 U/L (ref 0–45)
BASOPHILS # BLD AUTO: 0 10E3/UL (ref 0–0.2)
BASOPHILS NFR BLD AUTO: 1 %
BILIRUB SERPL-MCNC: 0.4 MG/DL (ref 0.2–1.3)
BUN SERPL-MCNC: 12 MG/DL (ref 7–30)
CALCIUM SERPL-MCNC: 9.8 MG/DL (ref 8.5–10.1)
CHLORIDE BLD-SCNC: 107 MMOL/L (ref 94–109)
CO2 SERPL-SCNC: 29 MMOL/L (ref 20–32)
CREAT SERPL-MCNC: 0.58 MG/DL (ref 0.52–1.04)
D DIMER PPP FEU-MCNC: 0.48 UG/ML FEU (ref 0–0.5)
EOSINOPHIL # BLD AUTO: 0.1 10E3/UL (ref 0–0.7)
EOSINOPHIL NFR BLD AUTO: 1 %
ERYTHROCYTE [DISTWIDTH] IN BLOOD BY AUTOMATED COUNT: 12.7 % (ref 10–15)
GFR SERPL CREATININE-BSD FRML MDRD: >90 ML/MIN/1.73M2
GLUCOSE BLD-MCNC: 93 MG/DL (ref 70–99)
HCT VFR BLD AUTO: 43.2 % (ref 35–47)
HGB BLD-MCNC: 13.8 G/DL (ref 11.7–15.7)
HOLD SPECIMEN: NORMAL
IMM GRANULOCYTES # BLD: 0 10E3/UL
IMM GRANULOCYTES NFR BLD: 0 %
LIPASE SERPL-CCNC: 93 U/L (ref 73–393)
LYMPHOCYTES # BLD AUTO: 2.1 10E3/UL (ref 0.8–5.3)
LYMPHOCYTES NFR BLD AUTO: 31 %
MCH RBC QN AUTO: 29.8 PG (ref 26.5–33)
MCHC RBC AUTO-ENTMCNC: 31.9 G/DL (ref 31.5–36.5)
MCV RBC AUTO: 93 FL (ref 78–100)
MONOCYTES # BLD AUTO: 0.4 10E3/UL (ref 0–1.3)
MONOCYTES NFR BLD AUTO: 5 %
NEUTROPHILS # BLD AUTO: 4.1 10E3/UL (ref 1.6–8.3)
NEUTROPHILS NFR BLD AUTO: 62 %
NRBC # BLD AUTO: 0 10E3/UL
NRBC BLD AUTO-RTO: 0 /100
PLATELET # BLD AUTO: 223 10E3/UL (ref 150–450)
POTASSIUM BLD-SCNC: 4.1 MMOL/L (ref 3.4–5.3)
PROT SERPL-MCNC: 7.3 G/DL (ref 6.8–8.8)
RBC # BLD AUTO: 4.63 10E6/UL (ref 3.8–5.2)
SODIUM SERPL-SCNC: 140 MMOL/L (ref 133–144)
TROPONIN I SERPL HS-MCNC: 6 NG/L
WBC # BLD AUTO: 6.6 10E3/UL (ref 4–11)

## 2022-02-19 PROCEDURE — 93005 ELECTROCARDIOGRAM TRACING: CPT

## 2022-02-19 PROCEDURE — 71046 X-RAY EXAM CHEST 2 VIEWS: CPT

## 2022-02-19 PROCEDURE — 80053 COMPREHEN METABOLIC PANEL: CPT | Performed by: EMERGENCY MEDICINE

## 2022-02-19 PROCEDURE — 85379 FIBRIN DEGRADATION QUANT: CPT | Performed by: EMERGENCY MEDICINE

## 2022-02-19 PROCEDURE — 83690 ASSAY OF LIPASE: CPT | Performed by: EMERGENCY MEDICINE

## 2022-02-19 PROCEDURE — 99285 EMERGENCY DEPT VISIT HI MDM: CPT | Mod: 25

## 2022-02-19 PROCEDURE — 85025 COMPLETE CBC W/AUTO DIFF WBC: CPT | Performed by: EMERGENCY MEDICINE

## 2022-02-19 PROCEDURE — 36415 COLL VENOUS BLD VENIPUNCTURE: CPT | Performed by: EMERGENCY MEDICINE

## 2022-02-19 PROCEDURE — 84484 ASSAY OF TROPONIN QUANT: CPT | Performed by: EMERGENCY MEDICINE

## 2022-02-19 RX ORDER — KETOROLAC TROMETHAMINE 15 MG/ML
15 INJECTION, SOLUTION INTRAMUSCULAR; INTRAVENOUS ONCE
Status: DISCONTINUED | OUTPATIENT
Start: 2022-02-19 | End: 2022-02-19 | Stop reason: HOSPADM

## 2022-02-19 RX ORDER — ACETAMINOPHEN 325 MG/1
650 TABLET ORAL ONCE
Status: DISCONTINUED | OUTPATIENT
Start: 2022-02-19 | End: 2022-02-19 | Stop reason: HOSPADM

## 2022-02-19 ASSESSMENT — ENCOUNTER SYMPTOMS
COUGH: 0
BACK PAIN: 1
SORE THROAT: 0
SHORTNESS OF BREATH: 0
FEVER: 0
VOMITING: 0
NAUSEA: 0

## 2022-02-19 NOTE — ED PROVIDER NOTES
History   Chief Complaint:  Chest Pain     The history is provided by the patient and the spouse.      Luba Nguyen is a 66 year old female with history of hypertension, hyperlipidemia, CAD, and SVT who presents with chest pain. The patient reports back pain since Sunday 02/13 as well as a few days of squeezing chest pain. She is unsure if they are related. The chest pain worsened this morning while driving. No alleviating or exacerbating factors. She denies any new exercises, heavy lifting, trauma, or falls. She has never had a similar chest pain, although she has had a fluttering sensation in her chest before. No associated shortness of breath or nausea. She denies recent fever, sore throat, cough, or vomiting. She has been using Tylenol for symptom management. She does take estrogen but denies history of blood clots.     Review of Systems   Constitutional: Negative for fever.   HENT: Negative for sore throat.    Respiratory: Negative for cough and shortness of breath.    Cardiovascular: Positive for chest pain.   Gastrointestinal: Negative for nausea and vomiting.   Musculoskeletal: Positive for back pain.   All other systems reviewed and are negative.        Allergies:  Atorvastatin  Ezetimibe  Hmg-Coa-R Inhibitors  Hydrocodone-Acetaminophen  Morphine  Hydrochlorothiazide W/Triamterene  Lisinopril  Omeprazole  Sulfa Drugs    Medications:  Aspirin  Lipitor  Lopressor  Xanax  Premarin  Protonix  Crestor  Synthroid    Past Medical History:     SVT  Atrial tachycardia  Colon polyp  Depression  GERD  CAD  Hyperlipidemia  Hypertension  Hypothyroidism  LBBB  Osteopenia    Conversion disorder    Past Surgical History:    Cholecystectomy  Colonoscopy  Dilation and curettage  MIMI and BSO     Family History:    Hyperlipidemia, father  Prostate cancer, father  Dementia, father  CAD, father  Anxiety, mother  Hypothyroidism, mother/sister  Hypertension, mother  Atrial fibrillation, mother    Social History:  Presents  to ED with .  Has plans to travel to [place] tomorrow, where she spends [place].     Physical Exam     Patient Vitals for the past 24 hrs:   BP Temp Temp src Pulse Resp SpO2   02/19/22 1445 (!) 155/84 -- -- -- -- 96 %   02/19/22 1415 (!) 143/86 -- -- -- -- 98 %   02/19/22 1400 (!) 161/80 -- -- 68 -- 98 %   02/19/22 1345 (!) 153/86 -- -- -- -- 98 %   02/19/22 1330 (!) 152/85 -- -- 72 -- 99 %   02/19/22 1309 (!) 177/86 97.5  F (36.4  C) Temporal 75 18 99 %       Physical Exam  Gen: well appearing, in no acute distress  HENT:  mmm, no rhinorrhea  Eyes: periorbital tissues and sclera normal   Neck: supple, no abnormal swelling  Lungs:  CTAB,  no resp distress, no reproducible chest discomfort.  CV: rrr, no m/r/g, ppi  Abd: soft, nontender, nondistended, no rebound/masses/guarding/hsm  Ext: no peripheral edema  Skin: warm, dry, well perfused, no rashes/bruising/lesions on exposed skin  Neuro: alert, MAEE, no gross motor or sensory deficits, gait stable  Psych: Normal mood, normal affect      Emergency Department Course   ECG  ECG obtained at 1319, ECG read at 1323  Normal sinus rhythm  Normal ECG   Rate 75 bpm. IN interval 170 ms. QRS duration 90 ms. QT/QTc 404/451 ms. P-R-T axes 71 -27 45.     Imaging:  XR Chest 2 Views   Final Result   IMPRESSION: The heart and lungs are normal and unchanged. Mild degenerative changes in the thoracic spine. Surgical clips in the right upper quadrant.        Report per radiology    Laboratory:  Labs Ordered and Resulted from Time of ED Arrival to Time of ED Departure   D DIMER QUANTITATIVE - Normal       Result Value    D-Dimer Quantitative 0.48     TROPONIN I - Normal    Troponin I High Sensitivity 6     COMPREHENSIVE METABOLIC PANEL - Normal    Sodium 140      Potassium 4.1      Chloride 107      Carbon Dioxide (CO2) 29      Anion Gap 4      Urea Nitrogen 12      Creatinine 0.58      Calcium 9.8      Glucose 93      Alkaline Phosphatase 66      AST 28      ALT 34      Protein  Total 7.3      Albumin 4.0      Bilirubin Total 0.4      GFR Estimate >90     LIPASE - Normal    Lipase 93     CBC WITH PLATELETS AND DIFFERENTIAL    WBC Count 6.6      RBC Count 4.63      Hemoglobin 13.8      Hematocrit 43.2      MCV 93      MCH 29.8      MCHC 31.9      RDW 12.7      Platelet Count 223      % Neutrophils 62      % Lymphocytes 31      % Monocytes 5      % Eosinophils 1      % Basophils 1      % Immature Granulocytes 0      NRBCs per 100 WBC 0      Absolute Neutrophils 4.1      Absolute Lymphocytes 2.1      Absolute Monocytes 0.4      Absolute Eosinophils 0.1      Absolute Basophils 0.0      Absolute Immature Granulocytes 0.0      Absolute NRBCs 0.0        Emergency Department Course:     Reviewed:  I reviewed nursing notes, vitals, past medical history, Care Everywhere and [facility].    Assessments:  1329 I obtained history and examined the patient as noted above.   1534 I rechecked the patient and explained findings.     Disposition:  The patient was discharged to home.     Impression & Plan     Medical Decision Makin-year-old female with left back and chest discomfort.  No pattern of exertion exacerbating the pain.  Differential here is musculoskeletal versus more concerning etiology such as ACS, MI, PE, dissection, pneumothorax, infectious process.  Work-up here is reassuring there are no emergent cardiopulmonary conditions occurring.  ECG shows no signs of occlusive coronary process or malignant arrhythmia.  Troponin is negative which given the duration of the symptoms I think makes an occlusive coronary process unlikely.  D-dimer negative making PE unlikely.  Chest radiograph with no acute abnormalities.  Nothing on the skin to suggest shingles.  Stratified low and of which she can safely be discharged home and clinically declare herself.  Her and her  are comfortable agreeable with that plan they understand what is known unknown what to watch out for when return here to emergency  department.    Diagnosis:    ICD-10-CM    1. Left-sided chest pain  R07.9      Scribe Disclosure:  I, Sarah Ibanez, am serving as a scribe at 1:29 PM on 2/19/2022 to document services personally performed by Jean Atkins MD based on my observations and the provider's statements to me.      Jean Atkins MD  02/19/22 1633

## 2022-02-19 NOTE — ED NOTES
Pt requesting to sit in chair due to back stiffness. Pt stable on feet, allowed to sit in chair per request.

## 2022-02-19 NOTE — ED NOTES
Blue top redrawn per lab, pt offered pain medications, pt declined politely. Denies pain at this time.

## 2022-02-21 LAB
ATRIAL RATE - MUSE: 75 BPM
DIASTOLIC BLOOD PRESSURE - MUSE: NORMAL MMHG
INTERPRETATION ECG - MUSE: NORMAL
P AXIS - MUSE: 71 DEGREES
PR INTERVAL - MUSE: 170 MS
QRS DURATION - MUSE: 90 MS
QT - MUSE: 404 MS
QTC - MUSE: 451 MS
R AXIS - MUSE: -27 DEGREES
SYSTOLIC BLOOD PRESSURE - MUSE: NORMAL MMHG
T AXIS - MUSE: 45 DEGREES
VENTRICULAR RATE- MUSE: 75 BPM

## 2022-03-18 ENCOUNTER — ALLIED HEALTH/NURSE VISIT (OUTPATIENT)
Dept: FAMILY MEDICINE | Facility: CLINIC | Age: 67
End: 2022-03-18
Payer: MEDICARE

## 2022-03-18 DIAGNOSIS — E53.8 VITAMIN B12 DEFICIENCY (NON ANEMIC): Primary | ICD-10-CM

## 2022-03-18 PROCEDURE — 99207 PR NO CHARGE NURSE ONLY: CPT

## 2022-03-18 PROCEDURE — 96372 THER/PROPH/DIAG INJ SC/IM: CPT | Performed by: INTERNAL MEDICINE

## 2022-03-18 RX ADMIN — CYANOCOBALAMIN 1000 MCG: 1000 INJECTION, SOLUTION INTRAMUSCULAR; SUBCUTANEOUS at 09:13

## 2022-03-18 NOTE — PROGRESS NOTES
Clinic Administered Medication Documentation      Injectable Medication Documentation    Patient was given Cyanocobalamin (B-12). Prior to medication administration, verified patients identity using patient s name and date of birth. Please see MAR and medication order for additional information. Patient instructed to remain in clinic for 15 minutes.      Was entire vial of medication used? Yes  Vial/Syringe: Single dose vial  Expiration Date:  11/30/2023  Was this medication supplied by the patient? No  Administered in LT arm        Krystal Guerrero MA

## 2022-03-21 DIAGNOSIS — Z78.0 MENOPAUSE: ICD-10-CM

## 2022-03-22 NOTE — TELEPHONE ENCOUNTER
Routing refill request to provider for review/approval because:  Labs out of range:  BP    BP Readings from Last 3 Encounters:   02/19/22 (!) 155/84   08/18/21 112/80   06/07/21 124/78     Silvano YING RN

## 2022-03-23 RX ORDER — CONJUGATED ESTROGENS 0.62 MG/1
TABLET, FILM COATED ORAL
Qty: 45 TABLET | Refills: 0 | Status: SHIPPED | OUTPATIENT
Start: 2022-03-23 | End: 2022-03-31

## 2022-03-23 NOTE — TELEPHONE ENCOUNTER
BLOOD PRESSURE running high. Will reassess at upcoming appt on 3/31/2022  If still high, then need to reassess risks, etc.

## 2022-03-31 ENCOUNTER — OFFICE VISIT (OUTPATIENT)
Dept: FAMILY MEDICINE | Facility: CLINIC | Age: 67
End: 2022-03-31
Payer: MEDICARE

## 2022-03-31 VITALS
HEIGHT: 67 IN | WEIGHT: 175.2 LBS | OXYGEN SATURATION: 98 % | TEMPERATURE: 97.9 F | DIASTOLIC BLOOD PRESSURE: 72 MMHG | HEART RATE: 67 BPM | RESPIRATION RATE: 16 BRPM | SYSTOLIC BLOOD PRESSURE: 132 MMHG | BODY MASS INDEX: 27.5 KG/M2

## 2022-03-31 DIAGNOSIS — E06.3 HASHIMOTO'S THYROIDITIS: ICD-10-CM

## 2022-03-31 DIAGNOSIS — Z78.0 MENOPAUSE: ICD-10-CM

## 2022-03-31 DIAGNOSIS — E53.8 VITAMIN B12 DEFICIENCY (NON ANEMIC): Primary | ICD-10-CM

## 2022-03-31 DIAGNOSIS — R73.09 ELEVATED HEMOGLOBIN A1C: ICD-10-CM

## 2022-03-31 DIAGNOSIS — E78.5 HYPERLIPIDEMIA LDL GOAL <100: ICD-10-CM

## 2022-03-31 DIAGNOSIS — K22.70 BARRETT'S ESOPHAGUS WITHOUT DYSPLASIA: ICD-10-CM

## 2022-03-31 PROCEDURE — 99214 OFFICE O/P EST MOD 30 MIN: CPT | Performed by: INTERNAL MEDICINE

## 2022-03-31 RX ORDER — METOPROLOL TARTRATE 25 MG/1
25 TABLET, FILM COATED ORAL DAILY
COMMUNITY

## 2022-03-31 ASSESSMENT — PAIN SCALES - GENERAL: PAINLEVEL: NO PAIN (0)

## 2022-03-31 NOTE — PROGRESS NOTES
"  Assessment & Plan     (E53.8) Vitamin B12 deficiency (non anemic)  (primary encounter diagnosis)  Comment:   Plan: Vitamin B12            (Z78.0) Menopause  Comment: reviewed risks vs benefits of longstanding use of HRT;  BLOOD PRESSURE OK, pt prefers to continue HRT at this time.   Plan: estrogen conj (PREMARIN) 0.625 MG tablet        Reassess in one year.    (E78.5) Hyperlipidemia LDL goal <100  Comment: statin use reviewed; well tolerated and effective results.  Lab Results   Component Value Date     08/18/2021     08/18/2020     07/25/2019     Future lab order placed. Goals of treatment reviewed.  Plan: Lipid panel reflex to direct LDL Fasting,         Comprehensive metabolic panel          (R73.09) Elevated hemoglobin A1c  Comment:   Lab Results   Component Value Date    A1C 5.9 08/18/2021    A1C 5.7 08/18/2020    A1C 5.9 07/08/2020    A1C 5.9 07/08/2020      prediabetes reviewed; healthy diet and regular exercise  Plan: Hemoglobin A1c          (E06.3) Hashimoto's thyroiditis  Comment: Clinically euthyroid; labs today, consider dose adjustment if labs warrant   Plan: TSH with free T4 reflex          (K22.70) Cueto's esophagus without dysplasia  Comment: PPI use reviewed;   Plan: Pt reminded of benefits of maximizing calcium and vit D to help with bone health (due to being on PPI)     Review of the result(s) of each unique test - chart reviewed including labs, meds and plan of care  Ordering of each unique test  Prescription drug management  35 minutes spent on the date of the encounter doing chart review, history and exam, documentation and further activities per the note       BMI:   Estimated body mass index is 27.85 kg/m  as calculated from the following:    Height as of this encounter: 1.689 m (5' 6.5\").    Weight as of this encounter: 79.5 kg (175 lb 3.2 oz).       MEDICATIONS:   Orders Placed This Encounter   Medications     metoprolol tartrate (LOPRESSOR) 25 MG tablet     Sig: " Take 25 mg by mouth daily as needed      estrogen conj (PREMARIN) 0.625 MG tablet     Sig: TAKE 0.5 TABLETS BY MOUTH DAILY     Dispense:  45 tablet     Refill:  2          - Continue other medications without change  FUTURE LABS:       - Schedule fasting labs in 2 months  Regular exercise    Return in about 4 months (around 8/1/2022) for Wellness visit, cholesterol, Thyroid, Prediabetes/Insulin resistance.    Mirela Ballesteros MD  Internal Medicine   [facility] JEIMY Toney is a 66 year old who presents for the following health issues     HPI     Hypertension Follow-up      Do you check your blood pressure regularly outside of the clinic? Yes     Are you following a low salt diet? No    Are your blood pressures ever more than 140 on the top number (systolic) OR more   than 90 on the bottom number (diastolic), for example 140/90? Yes      How many servings of fruits and vegetables do you eat daily?  2-3    On average, how many sweetened beverages do you drink each day (Examples: soda, juice, sweet tea, etc.  Do NOT count diet or artificially sweetened beverages)?   0    How many days per week do you exercise enough to make your heart beat faster? 3 or less    How many minutes a day do you exercise enough to make your heart beat faster? 30 - 60    How many days per week do you miss taking your medication? 0    ED/UC Followup:    Facility:  [facility].   Date of visit: 2/19/2022  Reason for visit: chest pain  Current Status: continues with irregular heart rate and blood pressure has been elevated at home but feels that may be her new blood pressure cuff.     She will be meeting with Cardiology and they are talking about a Pacemaker in May.        Hyperlipidemia Follow-Up      Are you regularly taking any medication or supplement to lower your cholesterol?   Yes- Rosuvastatin use reviewed    Are you having muscle aches or other side effects  "that you think could be caused by your cholesterol lowering medication?  No    Hypertension Follow-up      Do you check your blood pressure regularly outside of the clinic? Yes     Are you following a low salt diet? Yes    Are your blood pressures ever more than 140 on the top number (systolic) OR more   than 90 on the bottom number (diastolic), for example 140/90? No    Chronic Kidney Disease Follow-up  Lab Results   Component Value Date    CR 0.58 02/19/2022    CR 0.68 08/18/2021    CR 0.60 08/18/2020    CR 0.68 07/08/2020          Do you take any over the counter pain medicine?: No    Hypothyroidism Follow-up  Reviewed use pf Levothyroxine, labs due.      Since last visit, patient describes the following symptoms: Weight stable, no hair loss, no skin changes, no constipation, no loose stools        Review of Systems   CONSTITUTIONAL: NEGATIVE for fever, chills, change in weight  ENT/MOUTH: NEGATIVE for ear, mouth and throat problems  RESP: NEGATIVE for significant cough or SOB  CV: NEGATIVE for chest pain, palpitations or peripheral edema  MUSCULOSKELETAL: NEGATIVE for significant arthralgias or myalgia  NEURO: NEGATIVE for weakness, dizziness or paresthesias  ENDOCRINE: lipids, thyroid labs, meds and goals of therapy reviewed.  HEME/ALLERGY/IMMUNE: NEGATIVE for bleeding problems  PSYCHIATRIC: NEGATIVE for changes in mood or affect      Objective    /72   Pulse 67   Temp 97.9  F (36.6  C) (Oral)   Resp 16   Ht 1.689 m (5' 6.5\")   Wt 79.5 kg (175 lb 3.2 oz)   LMP  (LMP Unknown)   SpO2 98%   BMI 27.85 kg/m    Body mass index is 27.85 kg/m .  Physical Exam   GENERAL: healthy, alert and no distress  NECK: no adenopathy, no asymmetry, masses, or scars and thyroid normal to palpation  RESP: lungs clear to auscultation - no rales, rhonchi or wheezes  CV: regular rates and rhythm, normal S1 S2, no S3 or S4, peripheral pulses strong and no peripheral edema  ABDOMEN: soft, nontender and bowel sounds " normal  MS: no gross musculoskeletal defects noted, no edema  NEURO: Normal strength and tone, mentation intact and speech normal  PSYCH: mentation appears normal, affect normal/bright

## 2022-04-25 ENCOUNTER — ALLIED HEALTH/NURSE VISIT (OUTPATIENT)
Dept: FAMILY MEDICINE | Facility: CLINIC | Age: 67
End: 2022-04-25
Payer: MEDICARE

## 2022-04-25 DIAGNOSIS — E53.8 VITAMIN B12 DEFICIENCY (NON ANEMIC): Primary | ICD-10-CM

## 2022-04-25 PROCEDURE — 96372 THER/PROPH/DIAG INJ SC/IM: CPT | Performed by: INTERNAL MEDICINE

## 2022-04-25 PROCEDURE — 99207 PR NO CHARGE NURSE ONLY: CPT

## 2022-04-25 RX ADMIN — CYANOCOBALAMIN 1000 MCG: 1000 INJECTION, SOLUTION INTRAMUSCULAR; SUBCUTANEOUS at 09:13

## 2022-04-25 NOTE — PROGRESS NOTES
Clinic Administered Medication Documentation    Administrations This Visit     cyanocobalamin injection 1,000 mcg     Admin Date  04/25/2022 Action  Given Dose  1,000 mcg Route  Intramuscular Site   Administered By  Ellie Orr MA    Ordering Provider: Mirela Ballesteros MD    Patient Supplied?: No                  Injectable Medication Documentation    Patient was given Cyanocobalamin (B-12). Prior to medication administration, verified patients identity using patient s name and date of birth. Please see MAR and medication order for additional information. Patient instructed to remain in clinic for 15 minutes.      Was entire vial of medication used? Yes  Vial/Syringe: Single dose vial  Expiration Date:  12/31/2022  Was this medication supplied by the patient? No

## 2022-05-25 ENCOUNTER — ALLIED HEALTH/NURSE VISIT (OUTPATIENT)
Dept: FAMILY MEDICINE | Facility: CLINIC | Age: 67
End: 2022-05-25
Payer: MEDICARE

## 2022-05-25 DIAGNOSIS — E53.8 VITAMIN B12 DEFICIENCY (NON ANEMIC): Primary | ICD-10-CM

## 2022-05-25 PROCEDURE — 99207 PR NO CHARGE NURSE ONLY: CPT

## 2022-05-25 PROCEDURE — 96372 THER/PROPH/DIAG INJ SC/IM: CPT | Performed by: INTERNAL MEDICINE

## 2022-05-25 RX ADMIN — CYANOCOBALAMIN 1000 MCG: 1000 INJECTION, SOLUTION INTRAMUSCULAR; SUBCUTANEOUS at 10:36

## 2022-05-25 NOTE — PROGRESS NOTES
Clinic Administered Medication Documentation          Injectable Medication Documentation    Patient was given Cyanocobalamin (B-12). Prior to medication administration, verified patients identity using patient s name and date of birth. Please see MAR and medication order for additional information. Patient instructed to remain in clinic for 15 minutes.      Was entire vial of medication used? Yes  Vial/Syringe: Single dose vial  Expiration Date:  12/31/2022  Was this medication supplied by the patient? No

## 2022-06-06 DIAGNOSIS — Z78.0 ASYMPTOMATIC MENOPAUSE: ICD-10-CM

## 2022-06-06 NOTE — TELEPHONE ENCOUNTER
Routing refill request to provider for review/approval because:  Drug not on the [facility] refill protocol     Lakshmi Schaeffer, RN on 6/6/2022 at 3:40 PM

## 2022-06-22 ENCOUNTER — MYC MEDICAL ADVICE (OUTPATIENT)
Dept: FAMILY MEDICINE | Facility: CLINIC | Age: 67
End: 2022-06-22

## 2022-06-22 RX ORDER — CHOLECALCIFEROL (VITAMIN D3) 1250 MCG
CAPSULE ORAL
Qty: 12 CAPSULE | Refills: 4 | Status: SHIPPED | OUTPATIENT
Start: 2022-06-22 | End: 2022-06-23

## 2022-06-23 RX ORDER — CHOLECALCIFEROL (VITAMIN D3) 1250 MCG
CAPSULE ORAL
Qty: 12 CAPSULE | Refills: 4 | Status: SHIPPED | OUTPATIENT
Start: 2022-06-23 | End: 2023-07-11

## 2022-06-23 NOTE — TELEPHONE ENCOUNTER
Provider approved refill request. Received message that transmission of Rx to pharmacy failed.    Approved Medication Requests       Cholecalciferol 1250 mcg (63454 units) TAKE 1 CAPSULE BY MOUTH ONE TIME PER WEEK      Protocol Details    cholecalciferol (VITAMIN D3) 1250 mcg (49079 units) capsule 12 capsule 4 6/22/2022     Sig: TAKE 1 CAPSULE BY MOUTH ONE TIME PER WEEK    Sent to pharmacy as: Vitamin D3 1.25 MG (24517 UT) Oral Capsule (VITAMIN D3)    Class: E-Prescribe    E-Prescribing Status: Transmission to pharmacy failed (6/22/2022 11:27 PM CDT)        Will resend per written order.    Melissa YANEZ RN

## 2022-06-23 NOTE — TELEPHONE ENCOUNTER
Initial transmission to pharmacy failed. Resent today with confirmation that Rx was received by pharmacy.    Melissa YANEZ RN

## 2022-06-27 ENCOUNTER — ALLIED HEALTH/NURSE VISIT (OUTPATIENT)
Dept: FAMILY MEDICINE | Facility: CLINIC | Age: 67
End: 2022-06-27
Payer: MEDICARE

## 2022-06-27 DIAGNOSIS — E53.8 VITAMIN B12 DEFICIENCY (NON ANEMIC): Primary | ICD-10-CM

## 2022-06-27 PROCEDURE — 96372 THER/PROPH/DIAG INJ SC/IM: CPT | Performed by: INTERNAL MEDICINE

## 2022-06-27 PROCEDURE — 99207 PR NO CHARGE NURSE ONLY: CPT

## 2022-06-27 RX ADMIN — CYANOCOBALAMIN 1000 MCG: 1000 INJECTION, SOLUTION INTRAMUSCULAR; SUBCUTANEOUS at 10:32

## 2022-06-27 NOTE — PROGRESS NOTES
Clinic Administered Medication Documentation    Administrations This Visit     cyanocobalamin injection 1,000 mcg     Admin Date  06/27/2022 Action  Given Dose  1,000 mcg Route  Intramuscular Site  Left Deltoid Administered By  Crissy Rivas LPN    Ordering Provider: Mirela Ballesteros MD    Patient Supplied?: No                  Injectable Medication Documentation    Patient was given Cyanocobalamin (B-12). Prior to medication administration, verified patients identity using patient s name and date of birth. Please see MAR and medication order for additional information. Patient instructed to report any adverse reaction to staff immediately .      Was entire vial of medication used? Yes  Vial/Syringe: Single dose vial  Expiration Date:  1/2024  Was this medication supplied by the patient? No

## 2022-07-02 ENCOUNTER — HEALTH MAINTENANCE LETTER (OUTPATIENT)
Age: 67
End: 2022-07-02

## 2022-07-25 ENCOUNTER — LAB (OUTPATIENT)
Dept: LAB | Facility: CLINIC | Age: 67
End: 2022-07-25
Payer: MEDICARE

## 2022-07-25 DIAGNOSIS — E53.8 VITAMIN B12 DEFICIENCY (NON ANEMIC): ICD-10-CM

## 2022-07-25 DIAGNOSIS — R73.09 ELEVATED HEMOGLOBIN A1C: ICD-10-CM

## 2022-07-25 DIAGNOSIS — E06.3 HASHIMOTO'S THYROIDITIS: ICD-10-CM

## 2022-07-25 DIAGNOSIS — E78.5 HYPERLIPIDEMIA LDL GOAL <100: ICD-10-CM

## 2022-07-25 LAB
ALBUMIN SERPL-MCNC: 3.8 G/DL (ref 3.4–5)
ALP SERPL-CCNC: 61 U/L (ref 40–150)
ALT SERPL W P-5'-P-CCNC: 32 U/L (ref 0–50)
ANION GAP SERPL CALCULATED.3IONS-SCNC: 5 MMOL/L (ref 3–14)
AST SERPL W P-5'-P-CCNC: 25 U/L (ref 0–45)
BILIRUB SERPL-MCNC: 0.4 MG/DL (ref 0.2–1.3)
BUN SERPL-MCNC: 14 MG/DL (ref 7–30)
CALCIUM SERPL-MCNC: 9.1 MG/DL (ref 8.5–10.1)
CHLORIDE BLD-SCNC: 104 MMOL/L (ref 94–109)
CHOLEST SERPL-MCNC: 188 MG/DL
CO2 SERPL-SCNC: 27 MMOL/L (ref 20–32)
CREAT SERPL-MCNC: 0.56 MG/DL (ref 0.52–1.04)
FASTING STATUS PATIENT QL REPORTED: YES
GFR SERPL CREATININE-BSD FRML MDRD: >90 ML/MIN/1.73M2
GLUCOSE BLD-MCNC: 110 MG/DL (ref 70–99)
HBA1C MFR BLD: 5.9 % (ref 0–5.6)
HDLC SERPL-MCNC: 49 MG/DL
LDLC SERPL CALC-MCNC: 91 MG/DL
NONHDLC SERPL-MCNC: 139 MG/DL
POTASSIUM BLD-SCNC: 4.3 MMOL/L (ref 3.4–5.3)
PROT SERPL-MCNC: 7.1 G/DL (ref 6.8–8.8)
SODIUM SERPL-SCNC: 136 MMOL/L (ref 133–144)
T4 FREE SERPL-MCNC: 1.41 NG/DL (ref 0.76–1.46)
TRIGL SERPL-MCNC: 242 MG/DL
TSH SERPL DL<=0.005 MIU/L-ACNC: 0.38 MU/L (ref 0.4–4)
VIT B12 SERPL-MCNC: 450 PG/ML (ref 232–1245)

## 2022-07-25 PROCEDURE — 80061 LIPID PANEL: CPT

## 2022-07-25 PROCEDURE — 80053 COMPREHEN METABOLIC PANEL: CPT

## 2022-07-25 PROCEDURE — 82607 VITAMIN B-12: CPT

## 2022-07-25 PROCEDURE — 84443 ASSAY THYROID STIM HORMONE: CPT

## 2022-07-25 PROCEDURE — 36415 COLL VENOUS BLD VENIPUNCTURE: CPT

## 2022-07-25 PROCEDURE — 83036 HEMOGLOBIN GLYCOSYLATED A1C: CPT

## 2022-07-25 PROCEDURE — 84439 ASSAY OF FREE THYROXINE: CPT

## 2022-07-27 ENCOUNTER — ALLIED HEALTH/NURSE VISIT (OUTPATIENT)
Dept: FAMILY MEDICINE | Facility: CLINIC | Age: 67
End: 2022-07-27
Payer: MEDICARE

## 2022-07-27 DIAGNOSIS — R79.89 LOW VITAMIN B12 LEVEL: Primary | ICD-10-CM

## 2022-07-27 PROCEDURE — 99207 PR NO CHARGE NURSE ONLY: CPT

## 2022-07-27 PROCEDURE — 96372 THER/PROPH/DIAG INJ SC/IM: CPT | Performed by: INTERNAL MEDICINE

## 2022-07-27 RX ADMIN — CYANOCOBALAMIN 1000 MCG: 1000 INJECTION, SOLUTION INTRAMUSCULAR; SUBCUTANEOUS at 09:40

## 2022-07-27 NOTE — PROGRESS NOTES
Clinic Administered Medication Documentation    Administrations This Visit     cyanocobalamin injection 1,000 mcg     Admin Date  07/27/2022 Action  Given Dose  1,000 mcg Route  Intramuscular Site   Administered By  Magill, Lisa A, CMA    Ordering Provider: Mirela Ballesteros MD    Patient Supplied?: No              Injectable Medication Documentation    Patient was given Cyanocobalamin (B-12) in RIGHT DELTOID.  Prior to medication administration, verified patients identity using patient s name and date of birth. Please see MAR and medication order for additional information. Patient instructed to remain in clinic for 15 minutes and report any adverse reaction to staff immediately .      Was entire vial of medication used? Yes  Vial/Syringe: Single dose vial  Expiration Date:  01/2024  Was this medication supplied by the patient? No     Lisa Magill, CMA

## 2022-07-27 NOTE — PROGRESS NOTES
Prior to immunization administration, verified patients identity using patient s name and date of birth. Please see Immunization Activity for additional information.     Screening Questionnaire for Adult Immunization    Are you sick today?   No   Do you have allergies to medications, food, a vaccine component or latex?   Yes   Have you ever had a serious reaction after receiving a vaccination?   No   Do you have a long-term health problem with heart, lung, kidney, or metabolic disease (e.g., diabetes), asthma, a blood disorder, no spleen, complement component deficiency, a cochlear implant, or a spinal fluid leak?  Are you on long-term aspirin therapy?   No   Do you have cancer, leukemia, HIV/AIDS, or any other immune system problem?   No   Do you have a parent, brother, or sister with an immune system problem?   No   In the past 3 months, have you taken medications that affect  your immune system, such as prednisone, other steroids, or anticancer drugs; drugs for the treatment of rheumatoid arthritis, Crohn s disease, or psoriasis; or have you had radiation treatments?   No   Have you had a seizure, or a brain or other nervous system problem?   No   During the past year, have you received a transfusion of blood or blood    products, or been given immune (gamma) globulin or antiviral drug?   No   For women: Are you pregnant or is there a chance you could become       pregnant during the next month?   No   Have you received any vaccinations in the past 4 weeks?   No     Immunization questionnaire was positive for at least one answer.  Notified Dr. Ballesteros.        Per orders of Dr. Ballesteros, injection of B-12 injection given by Lisa A. Magill, CMA. Patient instructed to remain in clinic for 15 minutes afterwards, and to report any adverse reaction to me immediately.       Screening performed by Lisa A. Magill, CMA on 7/27/2022 at 9:35 AM.

## 2022-08-01 ENCOUNTER — OFFICE VISIT (OUTPATIENT)
Dept: FAMILY MEDICINE | Facility: CLINIC | Age: 67
End: 2022-08-01
Payer: MEDICARE

## 2022-08-01 VITALS
HEIGHT: 67 IN | OXYGEN SATURATION: 97 % | RESPIRATION RATE: 15 BRPM | HEART RATE: 67 BPM | SYSTOLIC BLOOD PRESSURE: 112 MMHG | BODY MASS INDEX: 27.42 KG/M2 | WEIGHT: 174.7 LBS | DIASTOLIC BLOOD PRESSURE: 62 MMHG | TEMPERATURE: 97.5 F

## 2022-08-01 DIAGNOSIS — E78.5 HYPERLIPIDEMIA LDL GOAL <100: ICD-10-CM

## 2022-08-01 DIAGNOSIS — R79.89 LOW VITAMIN B12 LEVEL: ICD-10-CM

## 2022-08-01 DIAGNOSIS — K22.70 BARRETT'S ESOPHAGUS WITHOUT DYSPLASIA: ICD-10-CM

## 2022-08-01 DIAGNOSIS — R73.03 PREDIABETES: ICD-10-CM

## 2022-08-01 DIAGNOSIS — F40.243 ANXIETY WITH FLYING: ICD-10-CM

## 2022-08-01 DIAGNOSIS — E06.3 HASHIMOTO'S THYROIDITIS: ICD-10-CM

## 2022-08-01 DIAGNOSIS — N89.8 VAGINAL DISCHARGE: ICD-10-CM

## 2022-08-01 DIAGNOSIS — Z00.00 ENCOUNTER FOR MEDICARE ANNUAL WELLNESS EXAM: Primary | ICD-10-CM

## 2022-08-01 LAB
CLUE CELLS: ABNORMAL
TRICHOMONAS, WET PREP: ABNORMAL
WBC'S/HIGH POWER FIELD, WET PREP: ABNORMAL
YEAST, WET PREP: ABNORMAL

## 2022-08-01 PROCEDURE — 87210 SMEAR WET MOUNT SALINE/INK: CPT | Performed by: INTERNAL MEDICINE

## 2022-08-01 PROCEDURE — G0438 PPPS, INITIAL VISIT: HCPCS | Performed by: INTERNAL MEDICINE

## 2022-08-01 PROCEDURE — 99214 OFFICE O/P EST MOD 30 MIN: CPT | Mod: 25 | Performed by: INTERNAL MEDICINE

## 2022-08-01 RX ORDER — ROSUVASTATIN CALCIUM 5 MG/1
5 TABLET, COATED ORAL
Qty: 30 TABLET | Refills: 6 | Status: SHIPPED | OUTPATIENT
Start: 2022-08-02 | End: 2023-08-02

## 2022-08-01 RX ORDER — PANTOPRAZOLE SODIUM 40 MG/1
40 TABLET, DELAYED RELEASE ORAL DAILY
Qty: 90 TABLET | Refills: 4 | Status: SHIPPED | OUTPATIENT
Start: 2022-08-01 | End: 2023-08-02

## 2022-08-01 RX ORDER — ALPRAZOLAM 0.25 MG
0.25 TABLET ORAL
Qty: 12 TABLET | Refills: 0 | Status: SHIPPED | OUTPATIENT
Start: 2022-08-01 | End: 2023-08-02

## 2022-08-01 RX ORDER — LEVOTHYROXINE SODIUM 125 MCG
125 TABLET ORAL DAILY
Qty: 90 TABLET | Refills: 4 | Status: SHIPPED | OUTPATIENT
Start: 2022-08-01 | End: 2023-08-02

## 2022-08-01 RX ORDER — CYANOCOBALAMIN 1000 UG/ML
1000 INJECTION, SOLUTION INTRAMUSCULAR; SUBCUTANEOUS
Qty: 3 ML | Refills: 4 | Status: SHIPPED | OUTPATIENT
Start: 2022-08-01 | End: 2024-09-25

## 2022-08-01 ASSESSMENT — ENCOUNTER SYMPTOMS
SHORTNESS OF BREATH: 0
EYE PAIN: 0
COUGH: 0
CONSTIPATION: 1
DYSURIA: 0
HEMATURIA: 0
WEAKNESS: 0
DIARRHEA: 0
FEVER: 0
MYALGIAS: 0
NAUSEA: 0
FREQUENCY: 0
PALPITATIONS: 0
HEARTBURN: 0
PARESTHESIAS: 0
ABDOMINAL PAIN: 0
HEMATOCHEZIA: 0
BREAST MASS: 0
JOINT SWELLING: 0
SORE THROAT: 0
DIZZINESS: 0
CHILLS: 0
HEADACHES: 0
NERVOUS/ANXIOUS: 1
ARTHRALGIAS: 0

## 2022-08-01 ASSESSMENT — PAIN SCALES - GENERAL: PAINLEVEL: NO PAIN (0)

## 2022-08-01 ASSESSMENT — ACTIVITIES OF DAILY LIVING (ADL): CURRENT_FUNCTION: NO ASSISTANCE NEEDED

## 2022-08-01 NOTE — PROGRESS NOTES
"Chief Complaint   Patient presents with     Wellness Visit     Thyroid Problem     Lipids     Recheck Medication     Requesting refill on Alprazolam          SUBJECTIVE:   Luba Nguyen is a 66 year old female who presents for Preventive Visit.      Patient has been advised of split billing requirements and indicates understanding: Yes  Are you in the first 12 months of your Medicare coverage?  No    Healthy Habits:     In general, how would you rate your overall health?  Good    Frequency of exercise:  2-3 days/week    Duration of exercise:  30-45 minutes    Do you usually eat at least 4 servings of fruit and vegetables a day, include whole grains    & fiber and avoid regularly eating high fat or \"junk\" foods?  Yes    Taking medications regularly:  Yes    Medication side effects:  None    Ability to successfully perform activities of daily living:  No assistance needed    Home Safety:  No safety concerns identified    Hearing Impairment:  No hearing concerns    In the past 6 months, have you been bothered by leaking of urine?  No    In general, how would you rate your overall mental or emotional health?  Good      PHQ-2 Total Score: 0    Additional concerns today:  Yes    Do you feel safe in your environment? Yes    Have you ever done Advance Care Planning? (For example, a Health Directive, POLST, or a discussion with a medical provider or your loved ones about your wishes): Yes, patient states has an Advance Care Planning document and will bring a copy to the clinic.       Fall risk  Fallen 2 or more times in the past year?: No  Any fall with injury in the past year?: No    Cognitive Screening   1) Repeat 3 items (Leader, Season, Table)    2) Clock draw: NORMAL  3) 3 item recall: Recalls 3 objects  Results: 3 items recalled: COGNITIVE IMPAIRMENT LESS LIKELY    Mini-CogTM Copyright NIDA Morris. Licensed by the author for use in [place] [employer]; reprinted with permission (abril@.[facility]). All rights reserved. "      Do you have sleep apnea, excessive snoring or daytime drowsiness?: no    Reviewed and updated as needed this visit by clinical staff   Tobacco  Allergies  Meds   Med Hx  Surg Hx  Fam Hx  Soc Hx          Reviewed and updated as needed this visit by Provider                   Social History     Tobacco Use     Smoking status: Never Smoker     Smokeless tobacco: Never Used   Substance Use Topics     Alcohol use: Yes         Alcohol Use 8/1/2022   Prescreen: >3 drinks/day or >7 drinks/week? No   Prescreen: >3 drinks/day or >7 drinks/week? -       Medication Followup of Alprazolam     Taking Medication as prescribed: yes    Side Effects:  None    Medication Helping Symptoms:  yes    Hyperlipidemia Follow-Up      Are you regularly taking any medication or supplement to lower your cholesterol?   Yes- Rosuvastatin  3 times per week; well tolerated.     Are you having muscle aches or other side effects that you think could be caused by your cholesterol lowering medication?  No    Hypothyroidism Follow-up      Since last visit, patient describes the following symptoms: Weight stable, no hair loss, no skin changes, no constipation, no loose stools      Current providers sharing in care for this patient include:   Patient Care Team:  Mirela Ballesteros MD as PCP - General (Internal Medicine)  Perez Leroy MD Newell, Debra Conlon, MD as Assigned PCP    The following health maintenance items are reviewed in Epic and correct as of today:  Health Maintenance Due   Topic Date Due     ANNUAL REVIEW OF HM ORDERS  04/15/2022     Labs reviewed in EPIC  BP Readings from Last 3 Encounters:   08/01/22 112/62   03/31/22 132/72   02/19/22 (!) 155/84    Wt Readings from Last 3 Encounters:   08/01/22 79.2 kg (174 lb 11.2 oz)   03/31/22 79.5 kg (175 lb 3.2 oz)   08/18/21 79.5 kg (175 lb 3.2 oz)                  Patient Active Problem List   Diagnosis     Hypertension     Hyperlipidemia     Hypothyroidism     Low serum vitamin D  "    GERD (gastroesophageal reflux disease)     Hyperglycemia     Low vitamin B12 level     Ischemic chest pain (H)     Heart palpitations     Past Surgical History:   Procedure Laterality Date     CHOLECYSTECTOMY      lap onel     CHOLECYSTECTOMY       COLONOSCOPY  2017     DILATION AND CURETTAGE      LAPAROSCOPIC TRANSVAGINAL     HYSTERECTOMY  2009     HYSTERECTOMY TOTAL ABDOMINAL, BILATERAL SALPINGO-OOPHORECTOMY, COMBINED  2009       Social History     Tobacco Use     Smoking status: Never Smoker     Smokeless tobacco: Never Used   Substance Use Topics     Alcohol use: Yes     Family History   Problem Relation Age of Onset     Diabetes Maternal Grandmother      Hyperlipidemia Father      Prostate Cancer Father      Colon Cancer Paternal Grandmother      Anxiety Disorder Mother      Anesthesia Reaction Mother      Thyroid Disease Mother      Thyroid Disease Sister      Hypertension Mother      Cerebrovascular Disease Mother      Anemia Mother      Mitral Regurgitation Mother      Atrial fibrillation Mother      Dementia Father      Heart Failure Father         HEART DISEASE     Coronary Artery Disease Father      Breast Cancer Maternal Aunt          Current Outpatient Medications   Medication Sig Dispense Refill     ALPRAZolam (XANAX) 0.25 MG tablet Take 1 tablet (0.25 mg) by mouth nightly as needed for anxiety (take prior to flying.) 12 tablet 0     cholecalciferol (VITAMIN D3) 1250 mcg (71526 units) capsule TAKE 1 CAPSULE BY MOUTH ONE TIME PER WEEK 12 capsule 4     cyanocobalamin (CYANOCOBALAMIN) 1000 MCG/ML injection Inject 1 mL (1,000 mcg) into the muscle every 30 days Pt will plan to start self injections and will need 12 syringes (25 Guage, 1\" needle) 3 mL 4     estrogen conj (PREMARIN) 0.625 MG tablet TAKE 0.5 TABLETS BY MOUTH DAILY 45 tablet 2     metoprolol tartrate (LOPRESSOR) 25 MG tablet Take 25 mg by mouth daily       Multiple Vitamins-Minerals (SYSTANE ICAPS AREDS2) CAPS Take 1 capsule by mouth 2 " times daily       pantoprazole (PROTONIX) 40 MG EC tablet Take 1 tablet (40 mg) by mouth daily 90 tablet 4     [START ON 8/2/2022] rosuvastatin (CRESTOR) 5 MG tablet Take 1 tablet (5 mg) by mouth three times a week 30 tablet 6     SYNTHROID 125 MCG tablet Take 1 tablet (125 mcg) by mouth daily 90 tablet 4     valACYclovir (VALTREX) 500 MG tablet Take 1 tablet by mouth daily as needed       Allergies   Allergen Reactions     Atorvastatin Muscle Pain (Myalgia)     Really bad muscle aches/legs     Ezetimibe Muscle Pain (Myalgia)     Hmg-Coa-R Inhibitors Other (See Comments)     GI UPSET     Hydrocodone-Acetaminophen Itching     Morphine Nausea and Vomiting     Other Drug Allergy (See Comments)      Pt states she is allergic to all narcotics      Hydrochlorothiazide W/Triamterene Rash     Lisinopril Rash     Omeprazole Rash     Sulfa Drugs Rash     Recent Labs   Lab Test 07/25/22  0831 02/19/22  1328 08/18/21  1005 08/18/20  0917 07/08/20  1030 07/08/20  1030   A1C 5.9*  --  5.9* 5.7*  --  5.9   LDL 91  --  114* 113*  --   --    HDL 49*  --  62 51  --   --    TRIG 242*  --  135 155*  --   --    ALT 32 34 35 35  --  26   CR 0.56 0.58 0.68 0.60  --  0.68   GFRESTIMATED >90 >90 >90 >90   < > >60   GFRESTBLACK  --   --   --  >90  --  >60   POTASSIUM 4.3 4.1 4.9 4.4  --  4.5   TSH 0.38*  --  0.44 0.65  --  0.10*    < > = values in this interval not displayed.      Mammogram Screening: Mammogram Screening: Recommended mammography every 1-2 years with patient discussion and risk factor consideration    FHS-7:   Breast CA Risk Assessment (FHS-7) 4/12/2021 4/15/2021 8/1/2022   Did any of your first-degree relatives have breast or ovarian cancer? No No No   Did any of your relatives have bilateral breast cancer? No No No   Did any man in your family have breast cancer? No No No   Did any woman in your family have breast and ovarian cancer? No No Yes   Did any woman in your family have breast cancer before age 50 y? No No No   Do  "you have 2 or more relatives with breast and/or ovarian cancer? No No No   Do you have 2 or more relatives with breast and/or bowel cancer? No No No       Mammogram Screening: Recommended mammography every 1-2 years with patient discussion and risk factor consideration  Pertinent mammograms are reviewed under the imaging tab.    Review of Systems   Constitutional: Negative for chills and fever.   HENT: Negative for congestion, ear pain, hearing loss and sore throat.    Eyes: Negative for pain and visual disturbance.   Respiratory: Negative for cough and shortness of breath.    Cardiovascular: Negative for chest pain, palpitations and peripheral edema.   Gastrointestinal: Positive for constipation. Negative for abdominal pain, diarrhea, heartburn, hematochezia and nausea.   Breasts:  Negative for tenderness, breast mass and discharge.   Genitourinary: Negative for dysuria, frequency, genital sores, hematuria, pelvic pain, urgency, vaginal bleeding and vaginal discharge.   Musculoskeletal: Negative for arthralgias, joint swelling and myalgias.   Skin: Negative for rash.   Neurological: Negative for dizziness, weakness, headaches and paresthesias.   Psychiatric/Behavioral: Negative for mood changes. The patient is nervous/anxious.          OBJECTIVE:   /62   Pulse 67   Temp 97.5  F (36.4  C) (Oral)   Resp 15   Ht 1.689 m (5' 6.5\")   Wt 79.2 kg (174 lb 11.2 oz)   LMP  (LMP Unknown)   SpO2 97%   BMI 27.77 kg/m   Estimated body mass index is 27.77 kg/m  as calculated from the following:    Height as of this encounter: 1.689 m (5' 6.5\").    Weight as of this encounter: 79.2 kg (174 lb 11.2 oz).  Physical Exam  GENERAL: healthy, alert and no distress  EYES: Eyes grossly normal to inspection  HENT: ear canals and TM's normal, nose and mouth without ulcers or lesions  NECK: no adenopathy, no asymmetry, masses, or scars and thyroid normal to palpation  RESP: lungs clear to auscultation - no rales, rhonchi or " wheezes  CV: regular rate and rhythm, normal S1 S2, no S3 or S4, no murmur, click or rub, no peripheral edema and peripheral pulses strong  ABDOMEN: soft, nontender, no hepatosplenomegaly, no masses and bowel sounds normal  MS: no gross musculoskeletal defects noted, no edema  NEURO: Normal strength and tone, sensory exam grossly normal and mentation intact  PSYCH: mentation appears normal, affect normal/bright    Diagnostic Test Results:  Labs reviewed in Epic              ASSESSMENT / PLAN:   (Z00.00) Encounter for Medicare annual wellness exam  (primary encounter diagnosis)  Comment: HEALTH CARE MAINTENANCE reviewed; colonoscopy 2017 and pt reports 7 year follow up and EGD done 2020 with 3 year follow up; could plan those two at the same time in the near future.   Plan: reviewed Mammo, colonoscopy and labs    (E06.3) Hashimoto's thyroiditis  Comment:   TSH   Date Value Ref Range Status   07/25/2022 0.38 (L) 0.40 - 4.00 mU/L Final   08/18/2020 0.65 0.40 - 4.00 mU/L Final      Free T4   Date Value Ref Range Status   07/25/2022 1.41 0.76 - 1.46 ng/dL Final   thyroid well controlled; clinically euthyroid  Plan: SYNTHROID 125 MCG tablet          (E78.5) Hyperlipidemia LDL goal <100  Comment: lipids reviewed; she is taking Rosuvastatin 3 times per week and it is well tolerated.   Plan: rosuvastatin (CRESTOR) 5 MG tablet, Lipid panel        reflex to direct LDL Fasting, Comprehensive         metabolic panel          (K22.70) Cueto's esophagus without dysplasia  Comment: Dx 1990, EGD every 3 years; last done in 2020 and due to reassess in 2023; Pantoprazole ( PPI) use reviewed. After next EGD, consider adjusting Pantoprazole to 20 mg per day.   Plan: pantoprazole (PROTONIX) 40 MG EC tablet        Pt reminded of benefits of maximizing calcium and vit D to help with bone health (due to being on PPI)     (N89.8) Vaginal discharge  Comment: intermittent symptoms, no vaginal itching reported.   Plan: Wet prep - Clinic  "Collect; treat results accordingly          (F40.243) Anxiety with flying  Comment: purchased place in [place] and planning to make several flights per year;  discussed use of benzodiazepine with flying do not recommend using for sleep;   Plan: ALPRAZolam (XANAX) 0.25 MG tablet        reviewed med supply to last one year ( enough for 6 RT flights); she is in agreement.     (R73.03) Prediabetes  Comment: elevated glucose and A1C in ithe past;   Lab Results   Component Value Date    A1C 5.9 07/25/2022    A1C 5.9 08/18/2021    A1C 5.7 08/18/2020    A1C 5.9 07/08/2020        Plan: Hemoglobin A1c        Regular exercise and healthy diet is encouraged; reassess in one year.     (E53.8) Low vitamin B12 level  Comment: she has been on Vit B12 replacement and getting monthly injections in the clinic. Since she will be wintering in [place], she would like to transition to home injections; she would like to have her  administer B12. She is encouraged to have her spouse accompany her to her nurse only appts for B12 injections in order to lear how to give injections ( he may have done it in the past) .   Plan: cyanocobalamin (CYANOCOBALAMIN) 1000 MCG/ML injection and syringeswith 25 G, 1 \" needle #12 for the year.             Patient has been advised of split billing requirements and indicates understanding: Yes    COUNSELING:  Reviewed preventive health counseling, as reflected in patient instructions       Regular exercise       Healthy diet/nutrition    Estimated body mass index is 27.77 kg/m  as calculated from the following:    Height as of this encounter: 1.689 m (5' 6.5\").    Weight as of this encounter: 79.2 kg (174 lb 11.2 oz).        She reports that she has never smoked. She has never used smokeless tobacco.      Appropriate preventive services were discussed with this patient, including applicable screening as appropriate for cardiovascular disease, diabetes, osteopenia/osteoporosis, and glaucoma.  As appropriate for " age/gender, discussed screening for colorectal cancer, prostate cancer, breast cancer, and cervical cancer. Checklist reviewing preventive services available has been given to the patient.    Reviewed patients plan of care and provided an AVS. The Complex Care Plan (for patients with higher acuity and needing more deliberate coordination of services) for Luba meets the Care Plan requirement. This Care Plan has been established and reviewed with the Patient.    Counseling Resources:  ATP IV Guidelines  Pooled Cohorts Equation Calculator  Breast Cancer Risk Calculator  Breast Cancer: Medication to Reduce Risk  FRAX Risk Assessment  ICSI Preventive Guidelines  Dietary Guidelines for Americans, 2010  [employer]'s MyPlate  ASA Prophylaxis  Lung CA Screening    Mirela Ballesteros MD  [facility] [place]  30 minutes in addition to HEALTH CARE MAINTENANCE are spent with patient, over 50% of that time spent providing counselling, discussing and reviewing medical conditions/concerns, meds and potential side effects.     Identified Health Risks:

## 2022-08-01 NOTE — PATIENT INSTRUCTIONS
Patient Education   Personalized Prevention Plan  You are due for the preventive services outlined below.  Your care team is available to assist you in scheduling these services.  If you have already completed any of these items, please share that information with your care team to update in your medical record.  Health Maintenance Due   Topic Date Due     Annual Wellness Visit  04/15/2022     ANNUAL REVIEW OF HM ORDERS  04/15/2022

## 2022-08-25 ENCOUNTER — ALLIED HEALTH/NURSE VISIT (OUTPATIENT)
Dept: FAMILY MEDICINE | Facility: CLINIC | Age: 67
End: 2022-08-25
Payer: MEDICARE

## 2022-08-25 DIAGNOSIS — E53.8 VITAMIN B12 DEFICIENCY (NON ANEMIC): Primary | ICD-10-CM

## 2022-08-25 PROCEDURE — 96372 THER/PROPH/DIAG INJ SC/IM: CPT | Performed by: INTERNAL MEDICINE

## 2022-08-25 PROCEDURE — 99207 PR NO CHARGE NURSE ONLY: CPT

## 2022-08-25 RX ORDER — CYANOCOBALAMIN 1000 UG/ML
1000 INJECTION, SOLUTION INTRAMUSCULAR; SUBCUTANEOUS
Status: ACTIVE | OUTPATIENT
Start: 2022-08-25 | End: 2023-08-20

## 2022-08-25 RX ADMIN — CYANOCOBALAMIN 1000 MCG: 1000 INJECTION, SOLUTION INTRAMUSCULAR; SUBCUTANEOUS at 08:34

## 2022-08-25 NOTE — PROGRESS NOTES
Clinic Administered Medication Documentation    Administrations This Visit     cyanocobalamin injection 1,000 mcg     Admin Date  08/25/2022 Action  Given Dose  1,000 mcg Route  Intramuscular Site  Left Deltoid Administered By  Krystal Guerrero    Ordering Provider: Mirela Ballesteros MD    Patient Supplied?: No                  Injectable Medication Documentation    Patient was given Cyanocobalamin (B-12). Prior to medication administration, verified patients identity using patient s name and date of birth. Please see MAR and medication order for additional information. Patient instructed to stay in clinic after the injection but patient declined.      Was entire vial of medication used? Yes  Vial/Syringe: Single dose vial  Expiration Date:  3/31/24  Was this medication supplied by the patient? No     Krystal Guerrero MA

## 2022-09-11 NOTE — PROGRESS NOTES
"Chief Complaint   Patient presents with     Medicare Visit     welcome to medicare visit.        SUBJECTIVE:   Luba Nguyen is a 65 year old female who presents for Preventive Visit.      Patient has been advised of split billing requirements and indicates understanding: Yes   Are you in the first 12 months of your Medicare coverage?  Yes,  Visual Acuity:  Right Eye: 10/12.5   Left Eye: 10/12.5  Both Eyes: 10/12.5    Healthy Habits:     In general, how would you rate your overall health?  Good    Frequency of exercise:  2-3 days/week    Duration of exercise:  15-30 minutes    Do you usually eat at least 4 servings of fruit and vegetables a day, include whole grains    & fiber and avoid regularly eating high fat or \"junk\" foods?  Yes    Taking medications regularly:  Yes    Ability to successfully perform activities of daily living:  No assistance needed    Home Safety:  No safety concerns identified    Hearing Impairment:  No hearing concerns    In the past 6 months, have you been bothered by leaking of urine?  No    In general, how would you rate your overall mental or emotional health?  Excellent      PHQ-2 Total Score: 0    Do you feel safe in your environment? YES    Have you ever done Advance Care Planning? (For example, a Health Directive, POLST, or a discussion with a medical provider or your loved ones about your wishes): Yes, patient states has an Advance Care Planning document and will bring a copy to the clinic.       Fall risk  Fallen 2 or more times in the past year?: No  Any fall with injury in the past year?: No    Cognitive Screening   1) Repeat 3 items (Leader, Season, Table)    2) Clock draw: NORMAL  3) 3 item recall: Recalls 2 objects   Results: NORMAL clock, 1-2 items recalled: COGNITIVE IMPAIRMENT LESS LIKELY    Mini-CogTM Copyright S Arturo. Licensed by the author for use in [facility] [employer]; reprinted with permission (abril@.[email or web address]). All rights reserved.      Do you have sleep " Pt had several episodes of bradycardia today. Only 1 complaint voiced of dizziness. Asx other then that. Pt aware of pacemaker placement scheduled for tomorrow. Pt remained on bedrest this shift. Refused to turn or reposition. Pt educated.    Electronically signed by Josesito Ruiz RN on 9/11/22 at 5:41 PM EDT apnea, excessive snoring or daytime drowsiness?: no    Reviewed and updated as needed this visit by clinical staff  Tobacco  Allergies  Meds  Problems  Med Hx  Surg Hx  Fam Hx  Soc Hx          Reviewed and updated as needed this visit by Provider                Social History     Tobacco Use     Smoking status: Never Smoker     Smokeless tobacco: Never Used   Substance Use Topics     Alcohol use: Yes     Frequency: Monthly or less     Binge frequency: Never     If you drink alcohol do you typically have >3 drinks per day or >7 drinks per week? No    Alcohol Use 4/15/2021   Prescreen: >3 drinks/day or >7 drinks/week? No   Prescreen: >3 drinks/day or >7 drinks/week? -   No flowsheet data found.          Current providers sharing in care for this patient include:   Patient Care Team:  Mirela Ballesteros MD as PCP - General (Internal Medicine)  Perez Leroy MD Newell, Debra Conlon, MD as Assigned PCP    The following health maintenance items are reviewed in Epic and correct as of today:  Health Maintenance Due   Topic Date Due     DEXA  Never done     ANNUAL REVIEW OF HM ORDERS  Never done     COLORECTAL CANCER SCREENING  Never done     HIV SCREENING  Never done     HEPATITIS C SCREENING  Never done     ZOSTER IMMUNIZATION (2 of 3) 05/28/2015     Labs reviewed in EPIC  BP Readings from Last 3 Encounters:   04/15/21 122/74   02/26/21 136/74   01/28/21 125/76    Wt Readings from Last 3 Encounters:   04/15/21 79.7 kg (175 lb 12.8 oz)   01/07/21 80.8 kg (178 lb 3.2 oz)   08/17/20 81.4 kg (179 lb 6.4 oz)                  There is no problem list on file for this patient.    Past Surgical History:   Procedure Laterality Date     CHOLECYSTECTOMY      lap onel     COLONOSCOPY  2017     HYSTERECTOMY  2009       Social History     Tobacco Use     Smoking status: Never Smoker     Smokeless tobacco: Never Used   Substance Use Topics     Alcohol use: Yes     Frequency: Monthly or less     Binge frequency: Never      Family History   Problem Relation Age of Onset     Diabetes Maternal Grandmother      Hyperlipidemia Father      Prostate Cancer Father      Colon Cancer Paternal Grandmother      Anxiety Disorder Mother      Anesthesia Reaction Mother      Thyroid Disease Mother      Thyroid Disease Sister          Current Outpatient Medications   Medication Sig Dispense Refill     ALPRAZolam (XANAX) 0.25 MG tablet Take 0.25 mg by mouth nightly as needed Take 1 tablet by mouth at bedtime as needed.       Blood Glucose Monitoring Suppl (ACCU-CHEK GUIDE) w/Device KIT daily as needed       cyanocobalamin (CYANOCOBALAMIN) 1000 MCG/ML injection Inject 1,000 mcg into the muscle every 30 days       estrogen conj (PREMARIN) 0.625 MG tablet Take 0.5 tablets (0.3125 mg) by mouth daily 90 tablet 3     Microlet Lancets MISC daily as needed       Multiple Vitamins-Minerals (MULTIVITAMIN ADULT PO) Take 1 tablet by mouth daily       Multiple Vitamins-Minerals (SYSTANE ICAPS AREDS2) CAPS Take 1 capsule by mouth 2 times daily       pantoprazole (PROTONIX) 40 MG EC tablet Take 1 tablet by mouth daily       Probiotic Product (PROBIOTIC ADVANCED PO) Take 1 tablet by mouth daily       rosuvastatin (CRESTOR) 5 MG tablet Take 1 tablet by mouth three times a week       SYNTHROID 125 MCG tablet Take 1 tablet (125 mcg) by mouth daily Pt takes brand name 90 tablet 3     valACYclovir (VALTREX) 500 MG tablet Take 1 tablet by mouth daily as needed       vitamin D3 (CHOLECALCIFEROL) 1.25 MG (73212 UT) capsule Take 1 capsule (50,000 Units) by mouth every 7 days 12 capsule 4     Allergies   Allergen Reactions     Atorvastatin Muscle Pain (Myalgia)     Really bad muscle aches/legs     Ezetimibe Muscle Pain (Myalgia)     Hmg-Coa-R Inhibitors Other (See Comments)     GI UPSET     Hydrocodone-Acetaminophen Itching     Morphine Nausea and Vomiting     Other Drug Allergy (See Comments)      Pt states she is allergic to all narcotics      Hydrochlorothiazide  W/Triamterene Rash     Lisinopril Rash     Omeprazole Rash     Sulfa Drugs Rash     Recent Labs   Lab Test 08/18/20  0917 07/08/20   A1C 5.7* 5.9*   *  --    HDL 51  --    TRIG 155*  --    ALT 35 26   CR 0.60 0.68   GFRESTIMATED >90 >60   GFRESTBLACK >90 >60   POTASSIUM 4.4 4.5   TSH 0.65 0.10*        FSH-7:   Breast CA Risk Assessment (FHS-7) 4/12/2021 4/15/2021   Did any of your first-degree relatives have breast or ovarian cancer? No No   Did any of your relatives have bilateral breast cancer? No No   Did any man in your family have breast cancer? No No   Did any woman in your family have breast and ovarian cancer? No No   Did any woman in your family have breast cancer before age 50 y? No No   Do you have 2 or more relatives with breast and/or ovarian cancer? No No   Do you have 2 or more relatives with breast and/or bowel cancer? No No     click delete button to remove this line now  Mammogram Screening: Recommended mammography every 1-2 years with patient discussion and risk factor consideration  Pertinent mammograms are reviewed under the imaging tab.    Review of Systems   Constitutional: Negative for chills and fever.   HENT: Negative for congestion, ear pain, hearing loss and sore throat.    Eyes: Negative for pain and visual disturbance.   Respiratory: Negative for cough and shortness of breath.    Cardiovascular: Negative for peripheral edema.   Gastrointestinal: Negative for abdominal pain, heartburn, hematochezia and nausea.   Breasts:  Negative for tenderness, breast mass and discharge.   Genitourinary: Negative for dysuria, frequency, genital sores, pelvic pain, urgency, vaginal bleeding and vaginal discharge.   Musculoskeletal: Negative for arthralgias and joint swelling.   Skin: Negative for rash.   Neurological: Negative for dizziness, weakness and paresthesias.   Psychiatric/Behavioral: Negative for mood changes.         OBJECTIVE:   /74 (BP Location: Right arm, Patient Position:  "Sitting, Cuff Size: Adult Regular)   Pulse 74   Temp 98.1  F (36.7  C) (Oral)   Resp 20   Ht 1.695 m (5' 6.75\")   Wt 79.7 kg (175 lb 12.8 oz)   LMP  (LMP Unknown)   SpO2 97%   BMI 27.74 kg/m   Estimated body mass index is 27.74 kg/m  as calculated from the following:    Height as of this encounter: 1.695 m (5' 6.75\").    Weight as of this encounter: 79.7 kg (175 lb 12.8 oz).  Physical Exam  GENERAL: healthy, alert and no distress  EYES: Eyes grossly normal to inspection  HENT: ear canals and TM's normal, nose and mouth without ulcers or lesions  NECK: no adenopathy, no asymmetry, masses, or scars and thyroid normal to palpation  RESP: lungs clear to auscultation - no rales, rhonchi or wheezes  BREAST: normal without masses, tenderness or nipple discharge and no palpable axillary masses or adenopathy  CV: regular rate and rhythm, normal S1 S2, no S3 or S4, no murmur, click or rub, no peripheral edema and peripheral pulses strong  ABDOMEN: soft, nontender, no hepatosplenomegaly, no masses and bowel sounds normal  MS: no gross musculoskeletal defects noted, no edema  SKIN: no suspicious lesions or rashes  NEURO: Normal strength and tone, mentation intact and speech normal  PSYCH: mentation appears normal, affect normal/bright    Diagnostic Test Results:  Labs reviewed in Epic    ASSESSMENT / PLAN:   (Z00.00) Welcome to Medicare preventive visit  (primary encounter diagnosis)  Comment: initial Welcome to Medicare Appt  Plan: HEALTH CARE MAINTENANCE reviewed    (Z12.11) Screen for colon cancer  Comment: done in 2017, GRIFFIN completed, faxed to [facility]  Plan:     (Z78.0) Asymptomatic menopause  Comment:   Plan: vitamin D3 (CHOLECALCIFEROL) 1.25 MG (42681 UT)        capsule, DX Hip/Pelvis/Spine        Keep active, regular exercise    (Z23) Encounter for immunization  Comment: immunizations reviewed; pneumonia vaccine #1 of 2, 6-12 months then due for Pneumovax; can be done at wellness visit next year or as nurse only " "visit in 6+ months  Plan: PCV13, IM (6+ WK) - [id number]            Patient has been advised of split billing requirements and indicates understanding: Yes  COUNSELING:  Reviewed preventive health counseling, as reflected in patient instructions       Regular exercise       Healthy diet/nutrition       Immunizations    Vaccinated for: Pneumococcal           Colonoscopy records requested; GRIFFIN completed.       Osteoporosis prevention/bone health    Estimated body mass index is 27.74 kg/m  as calculated from the following:    Height as of this encounter: 1.695 m (5' 6.75\").    Weight as of this encounter: 79.7 kg (175 lb 12.8 oz).        She reports that she has never smoked. She has never used smokeless tobacco.      Appropriate preventive services were discussed with this patient, including applicable screening as appropriate for cardiovascular disease, diabetes, osteopenia/osteoporosis, and glaucoma.  As appropriate for age/gender, discussed screening for colorectal cancer, prostate cancer, breast cancer, and cervical cancer. Checklist reviewing preventive services available has been given to the patient.    Reviewed patients plan of care and provided an AVS. The Basic Care Plan (routine screening as documented in Health Maintenance) for Luba meets the Care Plan requirement. This Care Plan has been established and reviewed with the Patient.    Counseling Resources:  ATP IV Guidelines  Pooled Cohorts Equation Calculator  Breast Cancer Risk Calculator  Breast Cancer: Medication to Reduce Risk  FRAX Risk Assessment  ICSI Preventive Guidelines  Dietary Guidelines for Americans, 2010  USDA's MyPlate  ASA Prophylaxis  Lung CA Screening    Mirela Ballesteros MD  Internal Medicine   [facility]    Identified Health Risks:  "

## 2022-09-23 ENCOUNTER — ALLIED HEALTH/NURSE VISIT (OUTPATIENT)
Dept: FAMILY MEDICINE | Facility: CLINIC | Age: 67
End: 2022-09-23
Payer: MEDICARE

## 2022-09-23 DIAGNOSIS — R79.89 LOW VITAMIN B12 LEVEL: Primary | ICD-10-CM

## 2022-09-23 PROCEDURE — 96372 THER/PROPH/DIAG INJ SC/IM: CPT | Performed by: INTERNAL MEDICINE

## 2022-09-23 PROCEDURE — 99207 PR NO CHARGE NURSE ONLY: CPT

## 2022-09-23 RX ADMIN — CYANOCOBALAMIN 1000 MCG: 1000 INJECTION, SOLUTION INTRAMUSCULAR; SUBCUTANEOUS at 09:20

## 2022-09-23 NOTE — PROGRESS NOTES
Clinic Administered Medication Documentation    Administrations This Visit     cyanocobalamin injection 1,000 mcg     Admin Date  09/23/2022 Action  Given Dose  1,000 mcg Route  Intramuscular Site  Right Deltoid Administered By  Magill, Lisa A, CMA    Ordering Provider: Mirela Ballesteros MD    Patient Supplied?: No                  Injectable Medication Documentation    Patient was given Cyanocobalamin (B-12). Prior to medication administration, verified patients identity using patient s name and date of birth. Please see MAR and medication order for additional information. Patient instructed to report any adverse reaction to staff immediately .      Was entire vial of medication used? Yes  Vial/Syringe: Single dose vial  Expiration Date:  03/24  Was this medication supplied by the patient? No     Lisa Magill, CMA

## 2022-09-23 NOTE — PROGRESS NOTES
Prior to immunization administration, verified patients identity using patient s name and date of birth. Please see Immunization Activity for additional information.     Screening Questionnaire for Adult Immunization    Are you sick today?   No   Do you have allergies to medications, food, a vaccine component or latex?   Yes   Have you ever had a serious reaction after receiving a vaccination?   No   Do you have a long-term health problem with heart, lung, kidney, or metabolic disease (e.g., diabetes), asthma, a blood disorder, no spleen, complement component deficiency, a cochlear implant, or a spinal fluid leak?  Are you on long-term aspirin therapy?   No   Do you have cancer, leukemia, HIV/AIDS, or any other immune system problem?   No   Do you have a parent, brother, or sister with an immune system problem?   No   In the past 3 months, have you taken medications that affect  your immune system, such as prednisone, other steroids, or anticancer drugs; drugs for the treatment of rheumatoid arthritis, Crohn s disease, or psoriasis; or have you had radiation treatments?   No   Have you had a seizure, or a brain or other nervous system problem?   No   During the past year, have you received a transfusion of blood or blood    products, or been given immune (gamma) globulin or antiviral drug?   No   For women: Are you pregnant or is there a chance you could become       pregnant during the next month?   No   Have you received any vaccinations in the past 4 weeks?   Yes     Immunization questionnaire was positive for at least one answer.  Notified Bessie HERNANDEZ CNP.        Per orders of DAVID Jerry CNP, injection of B-12 given by Lisa A. Magill, CMA. Patient instructed to remain in clinic for 15 minutes afterwards, and to report any adverse reaction to me immediately.       Screening performed by Lisa A. Magill, CMA on 9/23/2022 at 9:18 AM.

## 2022-10-12 ENCOUNTER — MYC MEDICAL ADVICE (OUTPATIENT)
Dept: FAMILY MEDICINE | Facility: CLINIC | Age: 67
End: 2022-10-12

## 2022-10-12 DIAGNOSIS — R42 VERTIGO: Primary | ICD-10-CM

## 2022-10-12 NOTE — TELEPHONE ENCOUNTER
See patient's MyChart message regarding vertigo     - Patient states that she has been experiencing vertigo   - Patient requesting a referral to ENT or a balance center     Routing to Dr. Ballesteros to review and advise.   Does patient need an appointment with you in order for the referral to be placed?  - Pended Physical Therapy referral for vestibular/balance rehabilitation and pended ENT referral     Dr. Ballesteros, please review, advise, and order a referral as appropriate.     Pina MADDEN RN   Patient Advocate Liaison (PAL)  [facility]

## 2022-10-12 NOTE — TELEPHONE ENCOUNTER
Called the pt to advise of below.      She says she has had vertigo before.  She was kind of walking sideways.  She has had it before.  It kind of comes and goes.  She has been doing the exercises with her head.  She went to a balance center 15 years ago.  She feels like what she has had in the past.      Advised to call to get therapy set up since she feels like this is what she had before.  Advised if worse she should be seen.

## 2022-10-21 ENCOUNTER — ALLIED HEALTH/NURSE VISIT (OUTPATIENT)
Dept: FAMILY MEDICINE | Facility: CLINIC | Age: 67
End: 2022-10-21
Payer: MEDICARE

## 2022-10-21 DIAGNOSIS — E53.8 VITAMIN B12 DEFICIENCY (NON ANEMIC): Primary | ICD-10-CM

## 2022-10-21 PROCEDURE — 96372 THER/PROPH/DIAG INJ SC/IM: CPT | Performed by: INTERNAL MEDICINE

## 2022-10-21 PROCEDURE — 99207 PR NO CHARGE NURSE ONLY: CPT

## 2022-10-21 RX ADMIN — CYANOCOBALAMIN 1000 MCG: 1000 INJECTION, SOLUTION INTRAMUSCULAR; SUBCUTANEOUS at 10:33

## 2022-10-21 NOTE — PROGRESS NOTES
The following medication was given:     MEDICATION: Vitamin B12  1000mcg  ROUTE: IM  SITE: Deltoid - Left  DOSE: 1000mcg  LOT #: [id number]  :  [employer]  EXPIRATION DATE:  04/2024

## 2022-11-21 ENCOUNTER — TRANSFERRED RECORDS (OUTPATIENT)
Dept: FAMILY MEDICINE | Facility: CLINIC | Age: 67
End: 2022-11-21

## 2022-11-21 ENCOUNTER — ALLIED HEALTH/NURSE VISIT (OUTPATIENT)
Dept: FAMILY MEDICINE | Facility: CLINIC | Age: 67
End: 2022-11-21
Payer: MEDICARE

## 2022-11-21 DIAGNOSIS — E53.8 VITAMIN B12 DEFICIENCY (NON ANEMIC): Primary | ICD-10-CM

## 2022-11-21 PROCEDURE — 99207 PR NO CHARGE NURSE ONLY: CPT

## 2022-11-21 PROCEDURE — 96372 THER/PROPH/DIAG INJ SC/IM: CPT | Performed by: INTERNAL MEDICINE

## 2022-11-21 RX ADMIN — CYANOCOBALAMIN 1000 MCG: 1000 INJECTION, SOLUTION INTRAMUSCULAR; SUBCUTANEOUS at 08:44

## 2022-11-21 NOTE — PROGRESS NOTES
Clinic Administered Medication Documentation    Administrations This Visit     cyanocobalamin injection 1,000 mcg     Admin Date  11/21/2022 Action  Given Dose  1,000 mcg Route  Intramuscular Site  Right Deltoid Administered By  Krystal Guerrero    Ordering Provider: Mirela Ballesteros MD    Patient Supplied?: No                  Injectable Medication Documentation    Patient was given Cyanocobalamin (B-12). Prior to medication administration, verified patients identity using patient s name and date of birth. Please see MAR and medication order for additional information.      Was entire vial of medication used? Yes  Vial/Syringe: Single dose vial  Expiration Date:  04/30/24  Was this medication supplied by the patient? No     Krystal Guerrero MA

## 2022-11-25 ENCOUNTER — HOSPITAL ENCOUNTER (OUTPATIENT)
Dept: MAMMOGRAPHY | Facility: CLINIC | Age: 67
Discharge: HOME | End: 2022-11-25
Attending: INTERNAL MEDICINE | Admitting: INTERNAL MEDICINE
Payer: MEDICARE

## 2022-11-25 DIAGNOSIS — Z12.31 VISIT FOR SCREENING MAMMOGRAM: ICD-10-CM

## 2022-11-25 PROCEDURE — 77067 SCR MAMMO BI INCL CAD: CPT

## 2022-11-28 ENCOUNTER — HOSPITAL ENCOUNTER (OUTPATIENT)
Dept: PHYSICAL THERAPY | Facility: CLINIC | Age: 67
Setting detail: THERAPIES SERIES
Discharge: HOME | End: 2022-11-28
Attending: FAMILY MEDICINE
Payer: MEDICARE

## 2022-11-28 DIAGNOSIS — H81.11 BENIGN PAROXYSMAL POSITIONAL VERTIGO, RIGHT: Primary | ICD-10-CM

## 2022-11-28 DIAGNOSIS — R42 VERTIGO: ICD-10-CM

## 2022-11-28 PROCEDURE — 97161 PT EVAL LOW COMPLEX 20 MIN: CPT | Mod: GP

## 2022-11-28 PROCEDURE — 95992 CANALITH REPOSITIONING PROC: CPT | Mod: GP

## 2022-11-28 NOTE — PROGRESS NOTES
11/28/22 1100   Quick Adds   Quick Adds Certification;Vestibular Eval   Type of Visit Initial OP PT Evaluation   General Information   Start of Care Date 11/28/22   Referring Physician Keyonna De Los Santos MD   Orders Evaluate and Treat as Indicated   Order Date 10/12/22   Medical Diagnosis Vertigo; Benign paroxysmal positional vertigo, right    Onset of illness/injury or Date of Surgery 10/28/22  (approximate start date)   Precautions/Limitations fall precautions   Surgical/Medical history reviewed Yes   Pertinent history of current problem (include personal factors and/or comorbidities that impact the POC) Pt is 66 y/o female referred to OP PT for a vestibular evaluation regarding her dizziness that began ~1 month ago. Pt states that she has experienced a similar phenomenon about 14 years ago when she woke up and had really intense spinning. At that point in time she went to a clinic in [place] where she states they did some head adjustments and she was completely fixed. Now she states she woke up about 1 month ago and as soon as she stood up fell sideways into the wall and hit her cheekbone, which gave her a large bruise but no other injuries. She then states she noticed that spinning once she made it to the bathroom. She has not had any falls since that morning but has had several times that the spinning has happened again, once when getting up from a massage table and once when doing yoga. Now she feels even if she looks up she is not sure if the spinning will start. Has never had an MRI or CT for this condition. Denies h/o concussion, migraines, numbness/tingling, weakness, changes in hearing, tinnitus. She denies anymore falls since the first day.   Pertinent Visual History  wears readers   Prior level of function comment IND   Previous/Current Treatment Physical Therapy   Improvement after PT Significant   Patient role/Employment history Retired   [facility]  [facility]   Home/Community Accessibility Comments 1 ALYSHA, going down stairs is harder   Assistive Devices Comments none   Patient/Family Goals Statement Wants to get rid of her dizziness   Fall Risk Screen   Fall screen completed by PT   Have you fallen 2 or more times in the past year? No   Have you fallen and had an injury in the past year? Yes  (bruised cheekbone)   Timed Up and Go score (seconds) not tested   Is patient a fall risk? Yes;Department fall risk interventions implemented   Fall screen comments d/t fluctuating diziness   System Outcome Measures   Outcome Measures BPPV   Dizziness Handicap Inventory (score out of 100) A decrease in score by 17.18 or greater indicates a clinically significant change in symptoms. 60   Pain   Pain comments Denies   Cognitive Status Examination   Orientation orientation to person, place and time   Level of Consciousness alert   Follows Commands and Answers Questions 100% of the time   Personal Safety and Judgment intact   Memory intact   Posture   Posture Normal   Bed Mobility   Bed Mobility Comments IND   Transfer Skills   Transfer Comments IND   Gait   Gait Comments No obvious veering or irregular gait pattern noted, slightly slower than normal   Gait Special Tests   Gait Special Tests 25 FOOT TIMED WALK;DYNAMIC GAIT INDEX   Gait Special Tests 25 Foot Timed Walk   Seconds 6.87   Comments 1.1 m/s   Gait Special Tests Dynamic Gait Index   Comments 4 item DGI: 11/12 (slight misstep with horizontal head turns although no dizziness) Not a risk for falls   Balance Special Tests   Balance Special Tests Modified CTSIB Conditions   Balance Special Tests Modified CTSIB Conditions   Condition 1, seconds 30 Seconds   Condition 2, seconds 30 Seconds   Condition 4, seconds 30 Seconds  (increased sway)   Condition 5, seconds 30 Seconds  (large increase in sway)   Cervicogenic Screen   Neck ROM WFL for positional testing   Oculomotor Exam   Smooth Pursuit Normal   Saccades Normal   VOR  Abnormal   VOR Comments slight increase in dizziness, not retinal slip noted   VOR Cancellation Abnormal   VOR Cancellation Comments increase in nausea, no retinal slip noted   Rapid Head Thrust Normal   Rapid Head Thrust Comments increases dizziness, no corrective saccade noted   Convergence Testing Normal   Convergence Testing Comments required readers to perform   Infrared Goggle Exam or Frenzel Lense Exam   Vestibular Suppressant in Last 24 Hours? No   Exam completed with Infrared Goggles   Spontaneous Nystagmus Negative   Gaze Evoked Nystagmus Negative   Head Shake Horizontal Nystagmus Negative   Positional testing Upbeating R torsional   Debora-Hallpike (right) Upbeating R torsional   Debora-Hallpike (right) comments nystagmus lasting ~20 sec with symptoms reported of spinning, no latency   Debora-Hallpike (Left) Negative   HSCC Supine Roll Test (Right) Negative   HSCC Supine Roll Test (Left) Negative   BPPV Canal(s) R Posterior   BPPV Type Canalithasis   Clinical Impression   Criteria for Skilled Therapeutic Interventions Met yes, treatment indicated   PT Diagnosis positive signs and symptoms of BPPV   Influenced by the following impairments vertigo with head position changes and bed mobility   Functional limitations due to impairments impaired bed mobility and participation in ADL's   Clinical Presentation Stable/Uncomplicated   Clinical Presentation Rationale subacute onset, past occurence of BPPV, low falls risk   Clinical Decision Making (Complexity) Low complexity   Therapy Frequency 1 time/week   Predicted Duration of Therapy Intervention (days/wks) 9 weeks   Risk & Benefits of therapy have been explained Yes   Patient, Family & other staff in agreement with plan of care Yes   Education Assessment   Preferred Learning Style Listening   Barriers to Learning No barriers   GOALS   PT Eval Goals 1;2   Goal 1   Goal Identifier DHI   Goal Description Pt to report significant decline in dizziness per DHI scoring </=  42/100 to be considered low perception of handicap.   Goal Progress eval: 60/100   Target Date 01/29/23   Goal 2   Goal Identifier Positionals   Goal Description Pt will demonstrate (-) s/s of BPPV throughout positional testing of all canals without limitations in bed mobility and transfers d/t dizziness   Goal Progress eval: R posterior canalithiasis   Target Date 01/29/23   Total Evaluation Time   PT Eval, Low Complexity Minutes (60262) 35   Therapy Certification   Certification date from 11/28/22   Certification date to 01/29/23   Medical Diagnosis Keyonna De Los Santos MD   Certification I certify the need for these services furnished under this plan of treatment and while under my care.  (Physician co-signature of this document indicates review and certification of the therapy plan).     Ernestine Saravia, PT, DPT    Physical Therapist  [email or web address]

## 2022-11-29 NOTE — PROGRESS NOTES
11/28/22 1100   Signing Clinician's Name / Credentials   Signing clinician's name / credentials Ernestine Saravia, PT, DPT   Session Number   Session Number 1/10   Authorization status Medicare - cert   Progress Note/Recertification   Recertification Due Date 01/29/23   Adult Goals   PT Eval Goals 1;2   Goal 1   Goal Identifier DHI   Goal Description Pt to report significant decline in dizziness per DHI scoring </= 42/100 to be considered low perception of handicap.   Goal Progress eval: 60/100   Target Date 01/29/23   Goal 2   Goal Identifier Positionals   Goal Description Pt will demonstrate (-) s/s of BPPV throughout positional testing of all canals without limitations in bed mobility and transfers d/t dizziness   Goal Progress eval: R posterior canalithiasis   Target Date 01/29/23   Subjective Report   Subjective Report See eval   System Outcome Measures   Dizziness Handicap Inventory (score out of 100) A decrease in score by 17.18 or greater indicates a clinically significant change in symptoms. 60   Treatment Interventions   Interventions Standard Canalith Repositioning   Cervicogenic Screen   Neck ROM WFL for positional testing   Oculomotor Exam   Smooth Pursuit Normal   Saccades Normal   VOR Abnormal   VOR Comments slight increase in dizziness, not retinal slip noted   VOR Cancellation Abnormal   VOR Cancellation Comments increase in nausea, no retinal slip noted   Rapid Head Thrust Normal   Rapid Head Thrust Comments increases dizziness, no corrective saccade noted   Convergence Testing Normal   Convergence Testing Comments required readers to perform   Infrared Goggle Exam or Frenzel Lense Exam   Vestibular Suppressant in Last 24 Hours? No   Exam completed with Infrared Goggles   Spontaneous Nystagmus Negative   Gaze Evoked Nystagmus Negative   Head Shake Horizontal Nystagmus Negative   Positional testing Upbeating R torsional   [place]-Hallpike (right) Upbeating R torsional   [place]-Hallpike (right) comments  nystagmus lasting ~20 sec with symptoms reported of spinning, no latency   [place]-Hallpike (Left) Negative   HS[facility] Supine Roll Test (Right) Negative   HSCC Supine Roll Test (Left) Negative   BPPV Canal(s) R Posterior   BPPV Type Canalithasis   Standard Canalith Repositioning   Standard canalith repositioning procedure minutes (05856) 10   Skilled Intervention CRM for R posterior canalithiasis   Patient Response some nystagmus and dizziness in first and thirs positions   Treatment Detail Performed R Epley x2 w/ 5 min rest between maneuvers. Educated pt on head movement precautions for rest of the day but then instructed that she should be able to perform whatever she wants w/o causing symptoms   Education   Learner Patient   Readiness Eager   Method Explanation   Response Verbalizes Understanding   Plan   Plan for next session Reassess and treat as indicated   Total Session Time   Timed Code Treatment Minutes 0   Total Treatment Time (sum of timed and untimed services) 45   Medicare Claim Information   Medical Diagnosis Vertigo   PT Diagnosis positive signs and symptoms of BPPV   Start of Care Date 11/28/22   Onset of illness/injury or Date of Surgery 10/28/22  (approximate start date)   Certification date from 11/28/22     Ernestine Saravia, PT, DPT    Physical Therapist  [email or web address]

## 2022-11-29 NOTE — PROGRESS NOTES
Outpatient Physical Therapy Evaluation  PLAN OF TREATMENT FOR OUTPATIENT REHABILITATION  (COMPLETE FOR INITIAL CLAIMS ONLY)  Patient's Last Name, First Name, M.I.  YOB: 1955  Luba Nguyen                        Provider's Name  Sasha Saravia, PT Medical Record No.  [id number]                               Onset Date:  10/28/22 (approximate)   Start of Care Date: 11/28/22     Type: Physical Therapy Medical Diagnosis: Vertigo; Benign paroxysmal positional vertigo, right                        Therapy Diagnosis:  Positive signs and symptoms of BPPV   Visits from SOC:  1   _________________________________________________________________________________  Plan of Treatment:      Frequency/Duration: 1x/week, up to 9 weeks decreasing frequency prn    Goals:   Goal 1   Goal Identifier DHI   Goal Description Pt to report significant decline in dizziness per DHI scoring </= 42/100 to be considered low perception of handicap.   Goal Progress eval: 60/100   Target Date 01/29/23   Goal 2   Goal Identifier Positionals   Goal Description Pt will demonstrate (-) s/s of BPPV throughout positional testing of all canals without limitations in bed mobility and transfers d/t dizziness   Goal Progress eval: R posterior canalithiasis   Target Date 01/29/23     _________________________________________________________________________________    I CERTIFY THE NEED FOR THESE SERVICES FURNISHED UNDER        THIS PLAN OF TREATMENT AND WHILE UNDER MY CARE     (Physician co-signature of this document indicates review and certification of the therapy plan).                Certification date from: 11/28/22  Certification date to: 01/29/23    Referring Provider: TSERING CAMPUZANO

## 2022-12-08 ENCOUNTER — TELEPHONE (OUTPATIENT)
Dept: FAMILY MEDICINE | Facility: CLINIC | Age: 67
End: 2022-12-08

## 2022-12-08 NOTE — TELEPHONE ENCOUNTER
Rec'd Prescriber Response Form from [facility]. Placed in PCP basket for review and signature. Fax [phone]    Ying Rand

## 2022-12-21 ENCOUNTER — ALLIED HEALTH/NURSE VISIT (OUTPATIENT)
Dept: FAMILY MEDICINE | Facility: CLINIC | Age: 67
End: 2022-12-21
Payer: MEDICARE

## 2022-12-21 DIAGNOSIS — R79.89 LOW VITAMIN B12 LEVEL: Primary | ICD-10-CM

## 2022-12-21 PROCEDURE — 99207 PR NO CHARGE NURSE ONLY: CPT

## 2022-12-21 PROCEDURE — 96372 THER/PROPH/DIAG INJ SC/IM: CPT | Performed by: INTERNAL MEDICINE

## 2022-12-21 RX ADMIN — CYANOCOBALAMIN 1000 MCG: 1000 INJECTION, SOLUTION INTRAMUSCULAR; SUBCUTANEOUS at 08:37

## 2023-01-19 DIAGNOSIS — B00.9 HSV (HERPES SIMPLEX VIRUS) INFECTION: ICD-10-CM

## 2023-01-20 RX ORDER — VALACYCLOVIR HYDROCHLORIDE 500 MG/1
500 TABLET, FILM COATED ORAL DAILY PRN
Qty: 30 TABLET | Refills: 3 | OUTPATIENT
Start: 2023-01-20

## 2023-01-20 NOTE — TELEPHONE ENCOUNTER
Refills on file - valACYclovir (VALTREX) 500 MG tablet 30 tablet 3 12/26/2022     Sirena Sutherland RN

## 2023-02-06 ENCOUNTER — NURSE TRIAGE (OUTPATIENT)
Dept: FAMILY MEDICINE | Facility: CLINIC | Age: 68
End: 2023-02-06

## 2023-02-06 ENCOUNTER — ALLIED HEALTH/NURSE VISIT (OUTPATIENT)
Dept: FAMILY MEDICINE | Facility: CLINIC | Age: 68
End: 2023-02-06
Payer: MEDICARE

## 2023-02-06 DIAGNOSIS — R79.89 LOW VITAMIN B12 LEVEL: Primary | ICD-10-CM

## 2023-02-06 PROCEDURE — 96372 THER/PROPH/DIAG INJ SC/IM: CPT | Performed by: INTERNAL MEDICINE

## 2023-02-06 PROCEDURE — 99207 PR NO CHARGE NURSE ONLY: CPT

## 2023-02-06 RX ADMIN — CYANOCOBALAMIN 1000 MCG: 1000 INJECTION, SOLUTION INTRAMUSCULAR; SUBCUTANEOUS at 09:02

## 2023-02-06 NOTE — TELEPHONE ENCOUNTER
"Nurse Triage SBAR    Is this a 2nd Level Triage? NO    Situation:   Patient with pain in her R calf 3/10 for the last 2 weeks     Background:   Patient recently traveled to [place] and did some hiking. Patient states that when she was on the plane, she noticed that her R calf and ankle had some swelling.    Patient states since arriving here on 2/3/2023, she states that her R knee has had some swelling as well, and the pain in her calf was localized around a \"knot\". Patient states that the knot has moved a couple of times under the skin. Patient states that today the knot has shifted to the back of her calf, where the day before it was located closer to her shin.     Assessment:   See below    Complaining of:   Pain 3/10 in her R calf    Denies:  SOB, CP, numbness, tingling, loss of sensation, pitting edema, calf hot to the touch,     Protocol Recommended Disposition:   See in Office Within 2 Weeks    Recommendation:   Huddled with PCP concerning next steps. Advises that the patient seek [place] [facility]. At this time PCP not concerned that there is a clot. If symptoms change, please call and speak with a triage nurse     Huddled with Dr. Ballesteros    Does the patient meet one of the following criteria for ADS visit consideration? 16+ years old, with an [facility] PCP     Reason for Disposition    MILD pain (e.g., does not interfere with normal activities) and present > 7 days    Additional Information    Negative: Looks like a broken bone or dislocated joint (e.g., crooked or deformed)    Negative: Sounds like a life-threatening emergency to the triager    Negative: Followed a hip injury    Negative: Followed a knee injury    Negative: Followed an ankle or foot injury    Negative: Back pain radiating (shooting) into leg(s)    Negative: Foot pain is main symptom    Negative: Ankle pain is main symptom    Negative: Knee pain is main symptom    Negative: Leg swelling is main symptom    Negative: Chest pain    " Negative: Difficulty breathing    Negative: Entire foot is cool or blue in comparison to other side    Negative: Unable to walk    Negative: Fever and red area (or area very tender to touch)    Negative: Swollen joint and fever    Negative: Thigh or calf pain in only one leg and present > 1 hour    Negative: Thigh, calf, or ankle swelling in only one leg    Negative: Thigh, calf, or ankle swelling in both legs, but one side is definitely more swollen    Negative: History of prior 'blood clot' in leg or lungs (i.e., deep vein thrombosis, pulmonary embolism)    Negative: History of inherited increased risk of blood clots (e.g., factor 5 Leiden, antithrombin 3, protein C or protein S deficiency, prothrombin mutation)    Negative: Major surgery in past month    Negative: Hip or leg fracture (broken bone) in past month (or had cast on leg or ankle in past month)    Negative: Illness requiring prolonged bedrest in past month (e.g., immobilization, long hospital stay)    Negative: Long-distance travel in past month (e.g., car, bus, train, plane; with trip lasting 6 or more hours)    Negative: Cancer treatment in past six months (or has cancer now)    Negative: Patient sounds very sick or weak to the triager    Negative: SEVERE pain (e.g., excruciating, unable to do any normal activities)    Negative: Cast on leg or ankle and now has increasing pain    Negative: Red area or streak > 2 inches (or 5 cm)    Negative: Painful rash with multiple small blisters grouped together (i.e., dermatomal distribution or 'band' or 'stripe')    Negative: Looks like a boil, infected sore, deep ulcer, or other infected rash (spreading redness, pus)    Negative: Localized rash is very painful (no fever)    Negative: MODERATE pain (e.g., interferes with normal activities, limping) and present > 3 days    Negative: Swollen joint with no fever or redness    Negative: Leg pain which occurs after walking a certain distance and disappears with rest,  "AND age > 50    Negative: Leg pain in shins (front of lower legs) and it occurs with running or jumping exercise (e.g., jogging, basketball)    Negative: Numbness in a leg or foot (i.e., loss of sensation)    Negative: Localized pain, redness or hard lump along vein    Negative: Patient wants to be seen    Answer Assessment - Initial Assessment Questions  1. ONSET: \"When did the pain start?\"       2 weeks ago pain started, and noticed a knot in the R calf  2. LOCATION: \"Where is the pain located?\"       Recent hiking in [place], and travel         R leg in the calf  3. PAIN: \"How bad is the pain?\"    (Scale 1-10; or mild, moderate, severe)    -  MILD (1-3): doesn't interfere with normal activities     -  MODERATE (4-7): interferes with normal activities (e.g., work or school) or awakens from sleep, limping     -  SEVERE (8-10): excruciating pain, unable to do any normal activities, unable to walk      3/10 pain   4. WORK OR EXERCISE: \"Has there been any recent work or exercise that involved this part of the body?\"       Recent trip to [place] with hiking, and airplane ride (arrived home on 2/3/2023)  5. CAUSE: \"What do you think is causing the leg pain?\"      Not sure if it is from the hiking  6. OTHER SYMPTOMS: \"Do you have any other symptoms?\" (e.g., chest pain, back pain, breathing difficulty, swelling, rash, fever, numbness, weakness)      No chest pain, no SOB, no numbness, no back pain  7. PREGNANCY: \"Is there any chance you are pregnant?\" \"When was your last menstrual period?\"      No    Protocols used: LEG PAIN-A-[place]    Cristopher Xie RN on 2/6/2023 at 9:43 AM   "

## 2023-02-06 NOTE — TELEPHONE ENCOUNTER
"Patient is seen today for Appt for B12 injection     Also wanted to speak with nurse concerning concern over a \"knot\" in her R calf.    Vitals recorded during encounter.    BP - 125/73  P - 77  RR - 14  SPO2 - 97%    "

## 2023-03-10 DIAGNOSIS — Z78.0 MENOPAUSE: ICD-10-CM

## 2023-03-13 NOTE — TELEPHONE ENCOUNTER
Prescription approved per [facility] Refill Protocol.  ARIANNA refill. Further refills at upcoming appointment.  Trixie Tom RN

## 2023-04-10 ENCOUNTER — ALLIED HEALTH/NURSE VISIT (OUTPATIENT)
Dept: FAMILY MEDICINE | Facility: CLINIC | Age: 68
End: 2023-04-10
Payer: MEDICARE

## 2023-04-10 DIAGNOSIS — R79.89 LOW VITAMIN B12 LEVEL: Primary | ICD-10-CM

## 2023-04-10 PROCEDURE — 96372 THER/PROPH/DIAG INJ SC/IM: CPT | Performed by: INTERNAL MEDICINE

## 2023-04-10 PROCEDURE — 99207 PR NO CHARGE NURSE ONLY: CPT

## 2023-04-10 RX ADMIN — CYANOCOBALAMIN 1000 MCG: 1000 INJECTION, SOLUTION INTRAMUSCULAR; SUBCUTANEOUS at 10:03

## 2023-04-10 NOTE — PROGRESS NOTES
The following medication was given:     MEDICATION: Vitamin B12  1000mcg  ROUTE: IM  SITE: Deltoid - Left  DOSE: 1000mcg    LOT #: [id number]  :  Nya  EXPIRATION DATE:  07/2024  NDC#: 83059-683-64

## 2023-05-11 ENCOUNTER — TRANSFERRED RECORDS (OUTPATIENT)
Dept: HEALTH INFORMATION MANAGEMENT | Facility: CLINIC | Age: 68
End: 2023-05-11
Payer: MEDICARE

## 2023-05-15 ENCOUNTER — ALLIED HEALTH/NURSE VISIT (OUTPATIENT)
Dept: FAMILY MEDICINE | Facility: CLINIC | Age: 68
End: 2023-05-15
Payer: MEDICARE

## 2023-05-15 DIAGNOSIS — R79.89 LOW VITAMIN B12 LEVEL: Primary | ICD-10-CM

## 2023-05-15 PROCEDURE — 96372 THER/PROPH/DIAG INJ SC/IM: CPT | Performed by: INTERNAL MEDICINE

## 2023-05-15 PROCEDURE — 99207 PR NO CHARGE NURSE ONLY: CPT

## 2023-05-15 RX ADMIN — CYANOCOBALAMIN 1000 MCG: 1000 INJECTION, SOLUTION INTRAMUSCULAR; SUBCUTANEOUS at 10:02

## 2023-05-15 NOTE — PROGRESS NOTES
Clinic Administered Medication Documentation      Injectable Medication Documentation    Is there an active order (written within the past 365 days, with administrations remaining, not ) in the chart? Yes.     Patient was given Cyanocobalamin (B-12). Prior to medication administration, verified patient's identity using patient s name and date of birth. Please see MAR and medication order for additional information. Patient instructed to remain in clinic for 15 minutes and report any adverse reaction to staff immediately.    Vial/Syringe: Single dose vial. Was entire vial of medication used? Yes  Was this medication supplied by the patient? No  Is this a medication the patient will need to receive again? Yes. Verified that the patient has refills remaining in their prescription.    Lisa Magill, CMA

## 2023-05-15 NOTE — PROGRESS NOTES
Prior to immunization administration, verified patients identity using patient s name and date of birth. Please see Immunization Activity for additional information.     Screening Questionnaire for Adult Immunization    Are you sick today?   No   Do you have allergies to medications, food, a vaccine component or latex?   Yes   Have you ever had a serious reaction after receiving a vaccination?   No   Do you have a long-term health problem with heart, lung, kidney, or metabolic disease (e.g., diabetes), asthma, a blood disorder, no spleen, complement component deficiency, a cochlear implant, or a spinal fluid leak?  Are you on long-term aspirin therapy?   Yes   Do you have cancer, leukemia, HIV/AIDS, or any other immune system problem?   No   Do you have a parent, brother, or sister with an immune system problem?   No   In the past 3 months, have you taken medications that affect  your immune system, such as prednisone, other steroids, or anticancer drugs; drugs for the treatment of rheumatoid arthritis, Crohn s disease, or psoriasis; or have you had radiation treatments?   No   Have you had a seizure, or a brain or other nervous system problem?   No   During the past year, have you received a transfusion of blood or blood    products, or been given immune (gamma) globulin or antiviral drug?   No   For women: Are you pregnant or is there a chance you could become       pregnant during the next month?   No   Have you received any vaccinations in the past 4 weeks?   No     Immunization questionnaire was positive for at least one answer.  Notified Dr. Ballesteros.    I have reviewed the following standing orders: Not Applicable; Order were already placed prior to ancillary visit    Injection of B-12 given by Lisa A. Magill, CMA. Patient instructed to remain in clinic for 15 minutes afterwards, and to report any adverse reactions.     Screening performed by Lisa A. Magill, CMA on 5/15/2023 at 9:59 AM.

## 2023-06-13 ENCOUNTER — TELEPHONE (OUTPATIENT)
Dept: FAMILY MEDICINE | Facility: CLINIC | Age: 68
End: 2023-06-13
Payer: MEDICARE

## 2023-06-15 ENCOUNTER — ALLIED HEALTH/NURSE VISIT (OUTPATIENT)
Dept: FAMILY MEDICINE | Facility: CLINIC | Age: 68
End: 2023-06-15
Payer: MEDICARE

## 2023-06-15 DIAGNOSIS — R79.89 LOW VITAMIN B12 LEVEL: Primary | ICD-10-CM

## 2023-06-15 DIAGNOSIS — E53.8 VITAMIN B12 DEFICIENCY (NON ANEMIC): ICD-10-CM

## 2023-06-15 PROCEDURE — 99207 PR NO CHARGE NURSE ONLY: CPT

## 2023-06-15 PROCEDURE — 96372 THER/PROPH/DIAG INJ SC/IM: CPT | Performed by: INTERNAL MEDICINE

## 2023-06-15 RX ADMIN — CYANOCOBALAMIN 1000 MCG: 1000 INJECTION, SOLUTION INTRAMUSCULAR; SUBCUTANEOUS at 08:40

## 2023-07-17 ENCOUNTER — ALLIED HEALTH/NURSE VISIT (OUTPATIENT)
Dept: FAMILY MEDICINE | Facility: CLINIC | Age: 68
End: 2023-07-17
Payer: MEDICARE

## 2023-07-17 DIAGNOSIS — R79.89 LOW VITAMIN B12 LEVEL: Primary | ICD-10-CM

## 2023-07-17 PROCEDURE — 99207 PR NO CHARGE NURSE ONLY: CPT

## 2023-07-17 PROCEDURE — 96372 THER/PROPH/DIAG INJ SC/IM: CPT | Performed by: INTERNAL MEDICINE

## 2023-07-17 RX ADMIN — CYANOCOBALAMIN 1000 MCG: 1000 INJECTION, SOLUTION INTRAMUSCULAR; SUBCUTANEOUS at 08:29

## 2023-07-26 ASSESSMENT — ENCOUNTER SYMPTOMS
HEMATOCHEZIA: 0
NERVOUS/ANXIOUS: 0
HEARTBURN: 0
COUGH: 0
CHILLS: 0
DIARRHEA: 0
HEADACHES: 0
FEVER: 0
CONSTIPATION: 0
PALPITATIONS: 0
FREQUENCY: 0
EYE PAIN: 0
ARTHRALGIAS: 0
SORE THROAT: 0
DYSURIA: 0
NAUSEA: 0
MYALGIAS: 1
BREAST MASS: 0
SHORTNESS OF BREATH: 0
WEAKNESS: 1
DIZZINESS: 1
PARESTHESIAS: 0
HEMATURIA: 0
JOINT SWELLING: 0
ABDOMINAL PAIN: 0

## 2023-07-26 ASSESSMENT — ACTIVITIES OF DAILY LIVING (ADL): CURRENT_FUNCTION: NO ASSISTANCE NEEDED

## 2023-07-31 ENCOUNTER — TRANSFERRED RECORDS (OUTPATIENT)
Dept: HEALTH INFORMATION MANAGEMENT | Facility: CLINIC | Age: 68
End: 2023-07-31
Payer: MEDICARE

## 2023-08-02 ENCOUNTER — OFFICE VISIT (OUTPATIENT)
Dept: FAMILY MEDICINE | Facility: CLINIC | Age: 68
End: 2023-08-02
Payer: MEDICARE

## 2023-08-02 ENCOUNTER — TELEPHONE (OUTPATIENT)
Dept: FAMILY MEDICINE | Facility: CLINIC | Age: 68
End: 2023-08-02

## 2023-08-02 VITALS
OXYGEN SATURATION: 97 % | TEMPERATURE: 97.5 F | SYSTOLIC BLOOD PRESSURE: 126 MMHG | DIASTOLIC BLOOD PRESSURE: 74 MMHG | WEIGHT: 177 LBS | HEIGHT: 67 IN | HEART RATE: 77 BPM | BODY MASS INDEX: 27.78 KG/M2 | RESPIRATION RATE: 15 BRPM

## 2023-08-02 DIAGNOSIS — R73.09 ELEVATED GLUCOSE: ICD-10-CM

## 2023-08-02 DIAGNOSIS — I47.10 SVT (SUPRAVENTRICULAR TACHYCARDIA) (H): ICD-10-CM

## 2023-08-02 DIAGNOSIS — E06.3 HASHIMOTO'S THYROIDITIS: ICD-10-CM

## 2023-08-02 DIAGNOSIS — F40.243 ANXIETY WITH FLYING: ICD-10-CM

## 2023-08-02 DIAGNOSIS — E78.5 HYPERLIPIDEMIA LDL GOAL <100: ICD-10-CM

## 2023-08-02 DIAGNOSIS — B00.9 HSV (HERPES SIMPLEX VIRUS) INFECTION: ICD-10-CM

## 2023-08-02 DIAGNOSIS — E07.9 DISORDER OF THYROID, UNSPECIFIED: ICD-10-CM

## 2023-08-02 DIAGNOSIS — M25.511 RIGHT SHOULDER PAIN, UNSPECIFIED CHRONICITY: ICD-10-CM

## 2023-08-02 DIAGNOSIS — K22.70 BARRETT'S ESOPHAGUS WITHOUT DYSPLASIA: ICD-10-CM

## 2023-08-02 DIAGNOSIS — Z00.00 ENCOUNTER FOR MEDICARE ANNUAL WELLNESS EXAM: Primary | ICD-10-CM

## 2023-08-02 DIAGNOSIS — R79.89 ELEVATED SERUM FREE T4 LEVEL: ICD-10-CM

## 2023-08-02 DIAGNOSIS — I20.9 ISCHEMIC CHEST PAIN (H): ICD-10-CM

## 2023-08-02 DIAGNOSIS — R79.89 ABNORMAL SERUM THYROXINE (T4) LEVEL: ICD-10-CM

## 2023-08-02 LAB
ALBUMIN SERPL BCG-MCNC: 4.5 G/DL (ref 3.5–5.2)
ALP SERPL-CCNC: 69 U/L (ref 35–104)
ALT SERPL W P-5'-P-CCNC: 31 U/L (ref 0–50)
ANION GAP SERPL CALCULATED.3IONS-SCNC: 11 MMOL/L (ref 7–15)
AST SERPL W P-5'-P-CCNC: 41 U/L (ref 0–45)
BILIRUB SERPL-MCNC: 0.4 MG/DL
BUN SERPL-MCNC: 10.6 MG/DL (ref 8–23)
CALCIUM SERPL-MCNC: 9.7 MG/DL (ref 8.8–10.2)
CHLORIDE SERPL-SCNC: 102 MMOL/L (ref 98–107)
CHOLEST SERPL-MCNC: 191 MG/DL
CREAT SERPL-MCNC: 0.58 MG/DL (ref 0.51–0.95)
DEPRECATED HCO3 PLAS-SCNC: 25 MMOL/L (ref 22–29)
GFR SERPL CREATININE-BSD FRML MDRD: >90 ML/MIN/1.73M2
GLUCOSE SERPL-MCNC: 110 MG/DL (ref 70–99)
HDLC SERPL-MCNC: 60 MG/DL
LDLC SERPL CALC-MCNC: 104 MG/DL
NONHDLC SERPL-MCNC: 131 MG/DL
POTASSIUM SERPL-SCNC: 4.7 MMOL/L (ref 3.4–5.3)
PROT SERPL-MCNC: 7.4 G/DL (ref 6.4–8.3)
SODIUM SERPL-SCNC: 138 MMOL/L (ref 136–145)
T4 FREE SERPL-MCNC: 1.94 NG/DL (ref 0.9–1.7)
TRIGL SERPL-MCNC: 134 MG/DL
TSH SERPL DL<=0.005 MIU/L-ACNC: 0.02 UIU/ML (ref 0.3–4.2)
VIT B12 SERPL-MCNC: 810 PG/ML (ref 232–1245)

## 2023-08-02 PROCEDURE — 99214 OFFICE O/P EST MOD 30 MIN: CPT | Mod: 25 | Performed by: INTERNAL MEDICINE

## 2023-08-02 PROCEDURE — 80053 COMPREHEN METABOLIC PANEL: CPT | Performed by: INTERNAL MEDICINE

## 2023-08-02 PROCEDURE — G0439 PPPS, SUBSEQ VISIT: HCPCS | Performed by: INTERNAL MEDICINE

## 2023-08-02 PROCEDURE — 84439 ASSAY OF FREE THYROXINE: CPT | Performed by: INTERNAL MEDICINE

## 2023-08-02 PROCEDURE — 84443 ASSAY THYROID STIM HORMONE: CPT | Performed by: INTERNAL MEDICINE

## 2023-08-02 PROCEDURE — 36415 COLL VENOUS BLD VENIPUNCTURE: CPT | Performed by: INTERNAL MEDICINE

## 2023-08-02 PROCEDURE — 82607 VITAMIN B-12: CPT | Performed by: INTERNAL MEDICINE

## 2023-08-02 PROCEDURE — 80061 LIPID PANEL: CPT | Performed by: INTERNAL MEDICINE

## 2023-08-02 RX ORDER — ROSUVASTATIN CALCIUM 5 MG/1
5 TABLET, COATED ORAL
Qty: 30 TABLET | Refills: 6 | Status: SHIPPED | OUTPATIENT
Start: 2023-08-03 | End: 2024-03-13

## 2023-08-02 RX ORDER — PANTOPRAZOLE SODIUM 40 MG/1
40 TABLET, DELAYED RELEASE ORAL DAILY
Qty: 90 TABLET | Refills: 4 | Status: CANCELLED | OUTPATIENT
Start: 2023-08-02

## 2023-08-02 RX ORDER — ALPRAZOLAM 0.25 MG
0.25 TABLET ORAL
Qty: 12 TABLET | Refills: 0 | Status: CANCELLED | OUTPATIENT
Start: 2023-08-02

## 2023-08-02 RX ORDER — VALACYCLOVIR HYDROCHLORIDE 500 MG/1
500 TABLET, FILM COATED ORAL DAILY PRN
Qty: 30 TABLET | Refills: 3 | Status: CANCELLED | OUTPATIENT
Start: 2023-08-02

## 2023-08-02 RX ORDER — ALPRAZOLAM 0.25 MG
0.25 TABLET ORAL
Qty: 12 TABLET | Refills: 0 | Status: SHIPPED | OUTPATIENT
Start: 2023-08-02 | End: 2024-03-13

## 2023-08-02 RX ORDER — PANTOPRAZOLE SODIUM 40 MG/1
40 TABLET, DELAYED RELEASE ORAL DAILY
Qty: 90 TABLET | Refills: 4 | Status: SHIPPED | OUTPATIENT
Start: 2023-08-02 | End: 2024-04-10

## 2023-08-02 RX ORDER — LEVOTHYROXINE SODIUM 125 MCG
125 TABLET ORAL DAILY
Qty: 90 TABLET | Refills: 4 | Status: SHIPPED | OUTPATIENT
Start: 2023-08-02 | End: 2024-03-13 | Stop reason: DRUGHIGH

## 2023-08-02 RX ORDER — ROSUVASTATIN CALCIUM 5 MG/1
5 TABLET, COATED ORAL
Qty: 30 TABLET | Refills: 6 | Status: CANCELLED | OUTPATIENT
Start: 2023-08-03

## 2023-08-02 ASSESSMENT — ENCOUNTER SYMPTOMS
CONSTIPATION: 0
EYE PAIN: 0
COUGH: 0
DYSURIA: 0
HEADACHES: 0
ARTHRALGIAS: 0
DIZZINESS: 1
NERVOUS/ANXIOUS: 0
PALPITATIONS: 0
NAUSEA: 0
SORE THROAT: 0
CHILLS: 0
SHORTNESS OF BREATH: 0
FREQUENCY: 0
JOINT SWELLING: 0
DIARRHEA: 0
BREAST MASS: 0
HEARTBURN: 0
HEMATOCHEZIA: 0
ABDOMINAL PAIN: 0
WEAKNESS: 1
HEMATURIA: 0
FEVER: 0
MYALGIAS: 1
PARESTHESIAS: 0

## 2023-08-02 ASSESSMENT — ACTIVITIES OF DAILY LIVING (ADL): CURRENT_FUNCTION: NO ASSISTANCE NEEDED

## 2023-08-02 ASSESSMENT — PAIN SCALES - GENERAL: PAINLEVEL: NO PAIN (0)

## 2023-08-02 NOTE — PROGRESS NOTES
"  Chief Complaint   Patient presents with    Wellness Visit    Thyroid Problem    Lipids    Gastrointestinal Problem         SUBJECTIVE:   Dawna is a 67 year old who presents for Preventive Visit.      8/2/2023     9:57 AM   Additional Questions   Roomed by Crissy PERSAUD LPN       Are you in the first 12 months of your Medicare coverage?  No    Healthy Habits:     In general, how would you rate your overall health?  Good    Frequency of exercise:  2-3 days/week    Duration of exercise:  30-45 minutes    Do you usually eat at least 4 servings of fruit and vegetables a day, include whole grains    & fiber and avoid regularly eating high fat or \"junk\" foods?  Yes    Taking medications regularly:  Yes    Medication side effects:  None    Ability to successfully perform activities of daily living:  No assistance needed    Home Safety:  No safety concerns identified    Hearing Impairment:  No hearing concerns    In the past 6 months, have you been bothered by leaking of urine?  No    In general, how would you rate your overall mental or emotional health?  Good    Additional concerns today:  Yes        Have you ever done Advance Care Planning? (For example, a Health Directive, POLST, or a discussion with a medical provider or your loved ones about your wishes): Yes, patient states has an Advance Care Planning document and will bring a copy to the clinic.       Fall risk  Fallen 2 or more times in the past year?: No  Any fall with injury in the past year?: Yes    Cognitive Screening   1) Repeat 3 items (Leader, Season, Table)    2) Clock draw: NORMAL  3) 3 item recall: Recalls 3 objects  Results: 3 items recalled: COGNITIVE IMPAIRMENT LESS LIKELY    Mini-CogTM Copyright NIDA Morris. Licensed by the author for use in [facility]; reprinted with permission (abril@.[facility]). All rights reserved.      Do you have sleep apnea, excessive snoring or daytime drowsiness? : no    Reviewed and updated as needed this visit by " clinical staff   Tobacco  Allergies  Meds              Reviewed and updated as needed this visit by Provider                 Social History     Tobacco Use    Smoking status: Never    Smokeless tobacco: Never   Substance Use Topics    Alcohol use: Yes             7/26/2023    10:07 AM   Alcohol Use   Prescreen: >3 drinks/day or >7 drinks/week? No     Do you have a current opioid prescription? No  Do you use any other controlled substances or medications that are not prescribed by a provider? None      Hyperlipidemia Follow-Up  Taking it 3 times per week; did not tolerate other statins. ( Myalgias with Atorvastatin) ; Rosuvastatin well tolerated and hepful  Are you regularly taking any medication or supplement to lower your cholesterol?   Yes- Rosuvastatin   Are you having muscle aches or other side effects that you think could be caused by your cholesterol lowering medication?  No    Hypothyroidism Follow-up    Since last visit, patient describes the following symptoms: weight gain of 5 lbs and fatigue;  ?thyroid?    GERD Follow up:  pt is on Pantoprazole and it works well to control the symptoms  (FYI she is having a polyp removed from her stomach at [facility])     Current providers sharing in care for this patient include:   Patient Care Team:  Mirela Ballesteros MD as PCP - General (Internal Medicine)  Perez Leroy MD Newell, Debra Conlon, MD as Assigned PCP    The following health maintenance items are reviewed in Epic and correct as of today:  Health Maintenance   Topic Date Due    COVID-19 Vaccine (6 - Pfizer series) 01/22/2023    ANNUAL REVIEW OF HM ORDERS  08/01/2023    TSH W/FREE T4 REFLEX  07/25/2023    MEDICARE ANNUAL WELLNESS VISIT  08/01/2023    INFLUENZA VACCINE (1) 09/01/2023    FALL RISK ASSESSMENT  08/02/2024    MAMMO SCREENING  11/25/2024    COLORECTAL CANCER SCREENING  05/11/2026    LIPID  07/25/2027    ADVANCE CARE PLANNING  08/01/2027    DTAP/TDAP/TD IMMUNIZATION (7 - Td or Tdap) 08/17/2030     KATIA  05/12/2036    PHQ-2 (once per calendar year)  Completed    Pneumococcal Vaccine: 65+ Years  Completed    ZOSTER IMMUNIZATION  Completed    IPV IMMUNIZATION  Aged Out    MENINGITIS IMMUNIZATION  Aged Out    HEPATITIS C SCREENING  Discontinued     Lab work is in process  Labs reviewed in [facility]:       4/12/2021     8:45 AM 4/15/2021     2:30 PM 8/1/2022     8:04 AM 11/25/2022     9:53 AM 7/26/2023    10:09 AM   Breast CA Risk Assessment ([facility]-7)   Did any of your first-degree relatives have breast or ovarian cancer? No No No No No   Did any of your relatives have bilateral breast cancer? No No No No No   Did any man in your family have breast cancer? No No No No No   Did any woman in your family have breast and ovarian cancer? No No Yes No No   Did any woman in your family have breast cancer before age 50 y? No No No No No   Do you have 2 or more relatives with breast and/or ovarian cancer? No No No No No   Do you have 2 or more relatives with breast and/or bowel cancer? No No No No No     click delete button to remove this line now  Mammogram Screening: Recommended mammography every 1-2 years with patient discussion and risk factor consideration  Pertinent mammograms are reviewed under the imaging tab.    Review of Systems   Constitutional:  Negative for chills and fever.   HENT:  Negative for congestion, ear pain, hearing loss and sore throat.    Eyes:  Negative for pain and visual disturbance.   Respiratory:  Negative for cough and shortness of breath.    Cardiovascular:  Negative for chest pain, palpitations and peripheral edema.   Gastrointestinal:  Negative for abdominal pain, constipation, diarrhea, heartburn, hematochezia and nausea.   Breasts:  Negative for tenderness, breast mass and discharge.   Genitourinary:  Negative for dysuria, frequency, genital sores, hematuria, pelvic pain, urgency, vaginal bleeding and vaginal discharge.   Musculoskeletal:  Positive for myalgias. Negative for  "arthralgias and joint swelling.   Skin:  Negative for rash.   Neurological:  Positive for dizziness and weakness. Negative for headaches and paresthesias.   Psychiatric/Behavioral:  Negative for mood changes. The patient is not nervous/anxious.    Saw vestibular clinic earlier this year for vertigo; helpful.      OBJECTIVE:   /74   Pulse 77   Temp 97.5  F (36.4  C) (Oral)   Resp 15   Ht 1.689 m (5' 6.5\")   Wt 80.3 kg (177 lb)   LMP  (LMP Unknown)   SpO2 97%   BMI 28.14 kg/m   Estimated body mass index is 28.14 kg/m  as calculated from the following:    Height as of this encounter: 1.689 m (5' 6.5\").    Weight as of this encounter: 80.3 kg (177 lb).  Physical Exam  GENERAL: healthy, alert and no distress  NECK: no adenopathy, no asymmetry, masses, or scars and thyroid normal to palpation  RESP: lungs clear to auscultation - no rales, rhonchi or wheezes  BREAST: normal without masses, tenderness or nipple discharge and no palpable axillary masses or adenopathy  CV: regular rates and rhythm, normal S1 S2, no S3 or S4, peripheral pulses strong, and no peripheral edema  ABDOMEN: soft, nontender, no hepatosplenomegaly, no masses and bowel sounds normal  MS: no gross musculoskeletal defects noted, no edema  NEURO: Normal strength and tone, sensory exam grossly normal, mentation intact, gait normal including heel/toe/tandem walking, and Romberg normal  PSYCH: mentation appears normal, affect normal/bright    Diagnostic Test Results:  Labs reviewed in Epic                ASSESSMENT / PLAN:   (Z00.00) Encounter for Medicare annual wellness exam  (primary encounter diagnosis)  Comment: HEALTH CARE MAINTENANCE reviewed.   Plan: colonoscopy reviewed - done 5/11/2023; 4 polyps noted     (E78.5) Hyperlipidemia LDL goal <100  Comment: goals of therapy reviewed; she is tolerating statin therapy 3 times per week.. past myalgias with Atorvastatin, would continue with hydrophilic statin therapy.  Could consider increase  " "Rosuvastatin to daily as tolerated.   Plan: Lipid panel reflex to direct LDL Fasting,         Comprehensive metabolic panel, rosuvastatin         (CRESTOR) 5 MG tablet, TSH with free T4 reflex          (K22.70) Cueto's esophagus without dysplasia  Comment: Dx 1990, EGD every 3 years; last done in 5/11/2023- reviewed; Pantoprazole ( PPI); large hiatal hernia and polyp  Plan: Vitamin B12, pantoprazole (PROTONIX) 40 MG EC         tablet        Remove large gastric polyp as planned.    (F40.243) Anxiety with flying  Comment: requests refill for the coining year,  Plan: ALPRAZolam (XANAX) 0.25 MG tablet          (E06.3) Hashimoto's thyroiditis  Comment: on meds for 32 years ; when in [place], noted hair changes. better now. Weight concerns addressed.  Plan: SYNTHROID 125 MCG tablet, TSH with free T4 reflex          (B00.9) HSV (herpes simplex virus) infection  Comment: More outbreaks in [place], sun is a trigger. Have adequate supply.  Plan: she has antiviral therapy available if needed.     (M25.511) Right shoulder pain, unspecified chronicity  Comment:      Right shoulder off and on; discomfort ; no injury/trauma     Plan: Orthopedic  Referral            Patient has been advised of split billing requirements and indicates understanding: Yes      COUNSELING:  Reviewed preventive health counseling, as reflected in patient instructions       Regular exercise       Healthy diet/nutrition      BMI:   Estimated body mass index is 28.14 kg/m  as calculated from the following:    Height as of this encounter: 1.689 m (5' 6.5\").    Weight as of this encounter: 80.3 kg (177 lb).         She reports that she has never smoked. She has never used smokeless tobacco.      Appropriate preventive services were discussed with this patient, including applicable screening as appropriate for cardiovascular disease, diabetes, osteopenia/osteoporosis, and glaucoma.  As appropriate for age/gender, discussed screening for colorectal cancer, " prostate cancer, breast cancer, and cervical cancer. Checklist reviewing preventive services available has been given to the patient.    Reviewed patients plan of care and provided an AVS. The Complex Care Plan (for patients with higher acuity and needing more deliberate coordination of services) for Luba meets the Care Plan requirement. This Care Plan has been established and reviewed with the Patient.          Mirela Ballesteros MD  Internal Medicine   [facility]    30 minutes in addition to HEALTH CARE MAINTENANCE are spent with patient, over 50% of that time spent providing counselling, discussing and reviewing medical conditions/concerns, meds and potential side effects.      Identified Health Risks:  I have reviewed Opioid Use Disorder and Substance Use Disorder risk factors and made any needed referrals.

## 2023-08-02 NOTE — PATIENT INSTRUCTIONS
Patient Education   Personalized Prevention Plan  You are due for the preventive services outlined below.  Your care team is available to assist you in scheduling these services.  If you have already completed any of these items, please share that information with your care team to update in your medical record.  Health Maintenance Due   Topic Date Due     COVID-19 Vaccine (6 - Pfizer series) 01/22/2023     ANNUAL REVIEW OF HM ORDERS  08/01/2023     Thyroid Function Lab  07/25/2023     Annual Wellness Visit  08/01/2023

## 2023-08-02 NOTE — TELEPHONE ENCOUNTER
Pt called seeking clarification on My Chart notification to schedule a follow up appt.  Advised pt it looks like it was to remind pt to schedule AWV for next year.  Pt would like to wait to schedule.      Pt also concerned about thyroid labs, advised Dr Ballesteros has not reviewed them yet.    Sandy Gleason RN, BSN  [facility]

## 2023-08-03 NOTE — TELEPHONE ENCOUNTER
Huddled with Dr Ballesteros, she will take a look at the labs.  She said she may consider lowering the dose or having lab redrawn because if pt took medication close to the time of blood draw, the result can be high.    Sandy Gleason RN, BSN  [facility]

## 2023-08-07 RX ORDER — SIMVASTATIN 20 MG
TABLET ORAL
Refills: 0 | OUTPATIENT
Start: 2023-08-07

## 2023-08-07 NOTE — TELEPHONE ENCOUNTER
At appt last week, renewed Rx for Rosuvastatin 3 times per week; well tolerated. Pt has not tolerated other statins, Atorvastatin listed.  Would NOT change to Simvastatin; prefer to continue Rosuvastatin three times per week.    The other option would be daily Pravastatin- water soluble option    Please get more information.  Thanks.

## 2023-08-08 ENCOUNTER — TRANSFERRED RECORDS (OUTPATIENT)
Dept: HEALTH INFORMATION MANAGEMENT | Facility: CLINIC | Age: 68
End: 2023-08-08
Payer: MEDICARE

## 2023-08-08 PROBLEM — I47.10 SVT (SUPRAVENTRICULAR TACHYCARDIA) (H): Status: ACTIVE | Noted: 2023-08-08

## 2023-08-08 NOTE — TELEPHONE ENCOUNTER
Talked to Dr. Ballesteros about the pts labs.  Pt concerned with thyroid labs.  See telephone call of 8/2/23.      See result note of 8/8/23.  Read that to the pt.      Pt says she took her thyroid medicine right before she came in.  Advised then that she should have her labs rechecked in the afternoon.  Lab only appt scheduled.

## 2023-08-08 NOTE — TELEPHONE ENCOUNTER
Called the pharmacy and spoke to Maicol.  Advised of below.  He said he is not sure where that came from unless it was the pt.  He said he sees pt is on crestor.      Called the pt and left a message to call us back.

## 2023-08-08 NOTE — TELEPHONE ENCOUNTER
Pt calls back.  Advised of below.  She says she wants the crestor.  She did not request simvastatin.      She is also wondering about recent lab work.  Will discuss with Dr. Ballesteros.

## 2023-08-10 ENCOUNTER — TELEPHONE (OUTPATIENT)
Dept: FAMILY MEDICINE | Facility: CLINIC | Age: 68
End: 2023-08-10
Payer: MEDICARE

## 2023-08-10 NOTE — TELEPHONE ENCOUNTER
Patient called because she received a voicemail to call back. Advised that there were no messages to be relayed. Patient realized that the voicemail was old and has no further questions.    Keara Mera RN on 8/10/2023 at 8:52 AM

## 2023-08-15 ENCOUNTER — ALLIED HEALTH/NURSE VISIT (OUTPATIENT)
Dept: FAMILY MEDICINE | Facility: CLINIC | Age: 68
End: 2023-08-15
Payer: MEDICARE

## 2023-08-15 DIAGNOSIS — R79.89 LOW VITAMIN B12 LEVEL: Primary | ICD-10-CM

## 2023-08-15 PROCEDURE — 99207 PR NO CHARGE NURSE ONLY: CPT

## 2023-08-15 PROCEDURE — 96372 THER/PROPH/DIAG INJ SC/IM: CPT | Performed by: INTERNAL MEDICINE

## 2023-08-15 RX ADMIN — CYANOCOBALAMIN 1000 MCG: 1000 INJECTION, SOLUTION INTRAMUSCULAR; SUBCUTANEOUS at 09:03

## 2023-08-15 NOTE — PROGRESS NOTES
Clinic Administered Medication Documentation      Injectable Medication Documentation    Is there an active order (written within the past 365 days, with administrations remaining, not ) in the chart? Yes.     Patient was given Cyanocobalamin (B-12). Prior to medication administration, verified patient's identity using patient s name and date of birth. Please see MAR and medication order for additional information. Patient instructed to remain in clinic for 15 minutes and report any adverse reaction to staff immediately.    Vial/Syringe: Single dose vial. Was entire vial of medication used? Yes  Was this medication supplied by the patient? No  Is this a medication the patient will need to receive again? Yes. Patient has no refills remaining. Refill encounter opened, order pended and Routed to the provider

## 2023-08-21 ENCOUNTER — LAB (OUTPATIENT)
Dept: LAB | Facility: CLINIC | Age: 68
End: 2023-08-21
Payer: MEDICARE

## 2023-08-21 DIAGNOSIS — R79.89 ABNORMAL SERUM THYROXINE (T4) LEVEL: ICD-10-CM

## 2023-08-21 DIAGNOSIS — E07.9 DISORDER OF THYROID, UNSPECIFIED: ICD-10-CM

## 2023-08-21 DIAGNOSIS — R79.89 ELEVATED SERUM FREE T4 LEVEL: ICD-10-CM

## 2023-08-21 DIAGNOSIS — R73.09 ELEVATED GLUCOSE: ICD-10-CM

## 2023-08-21 LAB
HBA1C MFR BLD: 6 % (ref 0–5.6)
T4 FREE SERPL-MCNC: 1.99 NG/DL (ref 0.9–1.7)
TSH SERPL DL<=0.005 MIU/L-ACNC: 0.02 UIU/ML (ref 0.3–4.2)

## 2023-08-21 PROCEDURE — 84439 ASSAY OF FREE THYROXINE: CPT

## 2023-08-21 PROCEDURE — 84443 ASSAY THYROID STIM HORMONE: CPT

## 2023-08-21 PROCEDURE — 83036 HEMOGLOBIN GLYCOSYLATED A1C: CPT

## 2023-08-21 PROCEDURE — 36415 COLL VENOUS BLD VENIPUNCTURE: CPT

## 2023-09-15 ENCOUNTER — TELEPHONE (OUTPATIENT)
Dept: FAMILY MEDICINE | Facility: CLINIC | Age: 68
End: 2023-09-15

## 2023-09-15 ENCOUNTER — ALLIED HEALTH/NURSE VISIT (OUTPATIENT)
Dept: FAMILY MEDICINE | Facility: CLINIC | Age: 68
End: 2023-09-15
Payer: MEDICARE

## 2023-09-15 DIAGNOSIS — R79.89 LOW VITAMIN B12 LEVEL: Primary | ICD-10-CM

## 2023-09-15 PROCEDURE — 96372 THER/PROPH/DIAG INJ SC/IM: CPT | Performed by: NURSE PRACTITIONER

## 2023-09-15 PROCEDURE — 99207 PR NO CHARGE NURSE ONLY: CPT

## 2023-09-15 RX ORDER — CYANOCOBALAMIN 1000 UG/ML
1000 INJECTION, SOLUTION INTRAMUSCULAR; SUBCUTANEOUS
Status: COMPLETED | OUTPATIENT
Start: 2023-09-15 | End: 2023-09-15

## 2023-09-15 RX ADMIN — CYANOCOBALAMIN 1000 MCG: 1000 INJECTION, SOLUTION INTRAMUSCULAR; SUBCUTANEOUS at 09:11

## 2023-09-15 NOTE — PROGRESS NOTES
Clinic Administered Medication Documentation      Injectable Medication Documentation    Is there an active order (written within the past 365 days, with administrations remaining, not ) in the chart? No.     Order was obtained by provider in clinic.  Patient was given Cyanocobalamin (B-12). Prior to medication administration, verified patient's identity using patient s name and date of birth. Please see MAR and medication order for additional information. Patient instructed to remain in clinic for 15 minutes and report any adverse reaction to staff immediately.    Vial/Syringe: Single dose vial. Was entire vial of medication used? Yes  Was this medication supplied by the patient?  No  Is this a medication the patient will need to receive again? Yes. Patient has no refills remaining. Refill encounter opened, order pended and Routed to the provider     Layla Delgado MA

## 2023-09-30 DIAGNOSIS — Z78.0 ASYMPTOMATIC MENOPAUSE: ICD-10-CM

## 2023-10-02 RX ORDER — CHOLECALCIFEROL (VITAMIN D3) 1250 MCG
CAPSULE ORAL
Qty: 12 CAPSULE | Refills: 1 | Status: SHIPPED | OUTPATIENT
Start: 2023-10-02 | End: 2024-03-04

## 2023-10-02 NOTE — TELEPHONE ENCOUNTER
Routing refill request to provider for review/approval because:  Drug not on the [facility] refill protocol   Dana De Dios RN

## 2023-10-04 ENCOUNTER — OFFICE VISIT (OUTPATIENT)
Dept: FAMILY MEDICINE | Facility: CLINIC | Age: 68
End: 2023-10-04
Payer: MEDICARE

## 2023-10-04 VITALS
BODY MASS INDEX: 28.11 KG/M2 | HEIGHT: 67 IN | WEIGHT: 179.1 LBS | TEMPERATURE: 97.5 F | HEART RATE: 84 BPM | RESPIRATION RATE: 15 BRPM | SYSTOLIC BLOOD PRESSURE: 124 MMHG | OXYGEN SATURATION: 96 % | DIASTOLIC BLOOD PRESSURE: 74 MMHG

## 2023-10-04 DIAGNOSIS — R53.82 CHRONIC FATIGUE: ICD-10-CM

## 2023-10-04 DIAGNOSIS — K59.01 SLOW TRANSIT CONSTIPATION: ICD-10-CM

## 2023-10-04 DIAGNOSIS — E06.3 HASHIMOTO'S THYROIDITIS: Primary | ICD-10-CM

## 2023-10-04 DIAGNOSIS — R68.89 CHANGE IN WEIGHT: ICD-10-CM

## 2023-10-04 PROCEDURE — G0008 ADMIN INFLUENZA VIRUS VAC: HCPCS | Performed by: INTERNAL MEDICINE

## 2023-10-04 PROCEDURE — 90662 IIV NO PRSV INCREASED AG IM: CPT | Performed by: INTERNAL MEDICINE

## 2023-10-04 PROCEDURE — 99214 OFFICE O/P EST MOD 30 MIN: CPT | Mod: 25 | Performed by: INTERNAL MEDICINE

## 2023-10-04 ASSESSMENT — PAIN SCALES - GENERAL: PAINLEVEL: NO PAIN (0)

## 2023-10-04 NOTE — PROGRESS NOTES
Assessment & Plan     (E06.3) Hashimoto's thyroiditis  (primary encounter diagnosis)  Comment: Thyroid meds for years; prefers the name brand; discussed dosing and timing of eating/meds; significant elevation labs; recommend Endocrine evaluation- possible would benefit with Thyroid US, thyroid uptake scan, etc.  Plan: Adult Endocrinology  Referral        Reiterate, taking thyroid meds  from food intake to decrease risk of food/med interaction and absorption.    (R53.82) Chronic fatigue  Comment: fluctuating energy level  Plan: multifactorial    (K59.01) Slow transit constipation  Comment: Fluctuates with Diarrhea  Plan: assess thyroid and suspect it will improve.     (R68.89) Change in weight  Comment: 5# weight gain in past year  Plan:     Review of the result(s) of each unique test - chart reviewed including meds, labs and plan of care.   Ordering of each unique test  Prescription drug management  30 minutes spent by me on the date of the encounter doing chart review, history and exam, documentation and further activities per the note       MEDICATIONS:   No orders of the defined types were placed in this encounter.        - Continue other medications without change  CONSULTATION/REFERRAL to Endocrine- Thyroid  Regular exercise    Mirela Ballesteros MD  Internal Medicine   [facility] JEIMY Toney is a 67 year old, presenting for the following health issues:  Hypertension, Lipids, and Thyroid Problem (Has been noting increased fatigue and weight gain.  Fluctuations in thyroid levels)        10/4/2023    12:48 PM   Additional Questions   Roomed by Crissy PERSAUD LPN       History of Present Illness       Hypothyroidism:     Since last visit, patient describes the following symptoms::  Constipation, Fatigue and Weight gain    Weight gain::  Less than 5 lbs.    She eats 2-3 servings of fruits and vegetables daily.She consumes 0 sweetened beverage(s) daily.She  exercises with enough effort to increase her heart rate 20 to 29 minutes per day.  She exercises with enough effort to increase her heart rate 3 or less days per week.   She is taking medications regularly.     Thyroid continues to note fluctuations in weight and fatigue.   Lab Results   Component Value Date    TSH 0.02 08/21/2023    TSH 0.02 08/02/2023    TSH 0.38 07/25/2022    TSH 0.44 08/18/2021    TSH 0.65 08/18/2020    TSH 0.10 07/08/2020      Lab Results   Component Value Date    T4 1.99 08/21/2023    T4 1.94 08/02/2023     She has been on Thyroid replacement  Most recent dose adjustment to Levothyroxine 125 mcg ( past several years, has fluctuated 112 mcg and 125 mcg)          Hyperlipidemia Follow-Up    Are you regularly taking any medication or supplement to lower your cholesterol?   Yes- rosuvastatin   Are you having muscle aches or other side effects that you think could be caused by your cholesterol lowering medication?  No  Recent Labs   Lab Test 08/02/23  1057 07/25/22  0831   CHOL 191 188   HDL 60 49*   * 91   TRIG 134 242*         Hypertension Follow-up    Do you check your blood pressure regularly outside of the clinic? No   Are you following a low salt diet? Yes  Are your blood pressures ever more than 140 on the top number (systolic) OR more   than 90 on the bottom number (diastolic), for example 140/90?       BP Readings from Last 3 Encounters:   10/04/23 124/74   08/02/23 126/74   08/01/22 112/62        Review of Systems   CONSTITUTIONAL:weight gain 5# fluctuating  INTEGUMENTARY/SKIN: dry skin, nails breakoff  RESP: NEGATIVE for significant cough or SOB  CV: she is on BB for tachycardia; she has seen cardiology and DX with [facility]  GI: Constipation alternating with Diarrhea  MUSCULOSKELETAL: NEGATIVE for significant arthralgias or myalgia  ENDOCRINE: taking DIANA Levothyroxine.   Lab Results   Component Value Date    TSH 0.02 08/21/2023    TSH 0.02 08/02/2023     Lab Results   Component Value  "Date    T4 1.99 08/21/2023    T4 1.94 08/02/2023          Objective    /74   Pulse 84   Temp 97.5  F (36.4  C) (Oral)   Resp 15   Ht 1.689 m (5' 6.5\")   Wt 81.2 kg (179 lb 1.6 oz)   LMP  (LMP Unknown)   SpO2 96%   BMI 28.47 kg/m    Body mass index is 28.47 kg/m .  Physical Exam   GENERAL: healthy, alert and no distress  NECK: no adenopathy, no asymmetry, masses, or scars and thyroid normal to palpation  RESP: lungs clear to auscultation - no rales, rhonchi or wheezes  CV: regular rates and rhythm, normal S1 S2, no S3 or S4, peripheral pulses strong, and no peripheral edema  ABDOMEN: soft, nontender, no hepatosplenomegaly, no masses and bowel sounds normal  MS: no gross musculoskeletal defects noted, no edema  NEURO: Normal strength and tone, mentation intact and speech normal  PSYCH: mentation appears normal, affect normal/bright                  "

## 2023-10-10 RX ORDER — CYANOCOBALAMIN 1000 UG/ML
1000 INJECTION, SOLUTION INTRAMUSCULAR; SUBCUTANEOUS
Status: ACTIVE | OUTPATIENT
Start: 2023-10-10 | End: 2024-09-04

## 2023-10-11 ENCOUNTER — TELEPHONE (OUTPATIENT)
Dept: ENDOCRINOLOGY | Facility: CLINIC | Age: 68
End: 2023-10-11
Payer: MEDICARE

## 2023-10-11 NOTE — TELEPHONE ENCOUNTER
Patient called to get in sooner [facility] Endocrine than 3/2024 as scheduled per protocol. When sooner wasn't available she requested that her visit  for March be cancelled as  she has an appointment in November elsewhere . New visit cancelled per request Holley Jaime RN on 10/11/2023 at 9:50 AM

## 2023-10-12 ENCOUNTER — TRANSFERRED RECORDS (OUTPATIENT)
Dept: HEALTH INFORMATION MANAGEMENT | Facility: CLINIC | Age: 68
End: 2023-10-12
Payer: MEDICARE

## 2023-10-16 ENCOUNTER — ALLIED HEALTH/NURSE VISIT (OUTPATIENT)
Dept: FAMILY MEDICINE | Facility: CLINIC | Age: 68
End: 2023-10-16
Payer: MEDICARE

## 2023-10-16 DIAGNOSIS — R79.89 LOW VITAMIN B12 LEVEL: Primary | ICD-10-CM

## 2023-10-16 PROCEDURE — 99207 PR NO CHARGE NURSE ONLY: CPT

## 2023-10-16 PROCEDURE — 96372 THER/PROPH/DIAG INJ SC/IM: CPT | Performed by: INTERNAL MEDICINE

## 2023-10-16 RX ADMIN — CYANOCOBALAMIN 1000 MCG: 1000 INJECTION, SOLUTION INTRAMUSCULAR; SUBCUTANEOUS at 11:04

## 2023-10-16 NOTE — PROGRESS NOTES
Clinic Administered Medication Documentation      Injectable Medication Documentation    Is there an active order (written within the past 365 days, with administrations remaining, not ) in the chart? Yes.     Patient was given Cyanocobalamin (B-12). Prior to medication administration, verified patient's identity using patient s name and date of birth. Please see MAR and medication order for additional information. Patient instructed to remain in clinic for 15 minutes and report any adverse reaction to staff immediately.    Vial/Syringe: Single dose vial. Was entire vial of medication used? Yes  Was this medication supplied by the patient? No  Is this a medication the patient will need to receive again? Yes. Verified that the patient has refills remaining in their prescription. Order expires 10/10/2024.    The following medication was given:     MEDICATION: Vitamin B12  1000mcg  ROUTE: IM  SITE: Deltoid - Right  DOSE: 1000mcg  LOT #: [id number]  :  [facility]  EXPIRATION DATE:  2025    Phyllis Taveras CMA

## 2023-10-18 NOTE — PROGRESS NOTES
Sports Medicine Clinic           ASSESSMENT and PLAN:     Dawna was seen today for pain.    Diagnoses and all orders for this visit:    Right shoulder pain, unspecified chronicity, Shoulder impingement, Tendinopathy of right rotator cuff  Chronic right shoulder pain consistent with subacromial impingement and rotator cuff tendinopathy without concern for a high grade tear given relatively preserved strength and good range of motion. Discussed treatment options including PT and CSI. Patient would like to start with PT and will follow up when she is back for [place] for consideration of a CSI if not having significant improvement.   -     Physical Therapy Referral; Future  -     topical diclofenac PRN  -     follow up in 2 months for subacromial CSI if needed    Return sooner if develops new or worsening symptoms.    Options for treatment and/or follow-up care were reviewed with the patient was actively involved in the decision making process. Patient verbalized understanding and was in agreement with the plan.    30 minutes spent on the date of the encounter doing chart reivew, history and exam, documentation and further activities as noted above with the exception of procedures.    Shanita Adams MD, [facility]  Primary Care Sports Medicine           SUBJECTIVE       Luba Nguyen is a 68 year old female presenting to clinic today with a chief complaint of right shoulder pain, referred by Mirela Ballesteros MD.  PT feels her shoulders are rolling forward, and wonders if this is a factor in her pain.    Onset: 6 month(s) ago. Reports insidious onset without acute precipitating event.  Location of Pain: right shoulder  Rating of Pain at worst: 8/10  Rating of Pain Currently: 3/10  Worsened by: movement, external rotation  Better with: rest  Treatments tried: ibuprofen  Associated symptoms: locking or catching  Orthopedic history: NO  Relevant surgical history: NO  Social history: social history: retired    She remembers  "noticing it when golfing last spring. She also notices with reaching to get her seatbelt on and when reaching to get her purse out of the back seat. It comes and goes. There are some days where she doesn't feel it all. She does feel like her golf swing isn't as strong. Her pain is more of a sudden catching. It could happen 2-3 times per day on a bad day. No previous shoulder injuries or dislocations. She isn't having trouble sleeping.     No numbness or tingling.     They winter in [place]. She is leaving in 10 days and then will be back [facility].    PMH, Medications and Allergies were reviewed and updated as needed.    ROS:  As noted above otherwise negative.    Patient Active Problem List   Diagnosis    Hypertension    Hyperlipidemia    Hypothyroidism    Low serum vitamin D    GERD (gastroesophageal reflux disease)    Hyperglycemia    Low vitamin B12 level    Ischemic chest pain (H24)    Heart palpitations    SVT (supraventricular tachycardia)       Current Outpatient Medications   Medication Sig Dispense Refill    ALPRAZolam (XANAX) 0.25 MG tablet Take 1 tablet (0.25 mg) by mouth nightly as needed for anxiety (take prior to flying.) 12 tablet 0    cholecalciferol (D3-50) 1250 mcg (44759 units) capsule TAKE 1 CAPSULE BY MOUTH ONE TIME PER WEEK 12 capsule 1    cyanocobalamin (CYANOCOBALAMIN) 1000 MCG/ML injection Inject 1 mL (1,000 mcg) into the muscle every 30 days Pt will plan to start self injections and will need 12 syringes (25 Guage, 1\" needle) 3 mL 4    estrogen conj (PREMARIN) 0.625 MG tablet TAKE 1/2 TABLET BY MOUTH EVERY DAY 45 tablet 2    metoprolol tartrate (LOPRESSOR) 25 MG tablet Take 25 mg by mouth daily      Multiple Vitamins-Minerals (SYSTANE ICAPS AREDS2) CAPS Take 1 capsule by mouth 2 times daily      pantoprazole (PROTONIX) 40 MG EC tablet Take 1 tablet (40 mg) by mouth daily 90 tablet 4    rosuvastatin (CRESTOR) 5 MG tablet Take 1 tablet (5 mg) by mouth three times a week 30 " tablet 6    SYNTHROID 125 MCG tablet Take 1 tablet (125 mcg) by mouth daily 90 tablet 4    valACYclovir (VALTREX) 500 MG tablet Take 1 tablet (500 mg) by mouth daily as needed (viral outbreak) 30 tablet 3            OBJECTIVE:       Vitals:   Vitals:    10/20/23 0955   BP: 124/84   Weight: 81.2 kg (179 lb)     BMI: Body mass index is 28.46 kg/m .    Gen:  Well nourished and in no acute distress  HEENT: Extraocular movement intact  Pulm:  Breathing Comfortably. No increased respiratory effort.  Psych: Euthymic. Appropriately answers questions    MSK:   RIGHT SHOULDER  Inspection:    no swelling, bruising, discoloration, or obvious deformity or asymmetry  Palpation:    Tender about the supraspinatus insertion and infraspinatus insertion. Remainder of bony and tendinous landmarks are nontender.    Crepitus is Present  Active Range of Motion:     Abduction 1650 / FF 1650 / ER normal0 / IR L4.  Opposite arm abduction/flexion/ER full, IR L4  Strength:    Scapular plane abduction 5-/5 / ER 5/5 / IR 5-/5, painful / biceps 5-/5 / triceps 5/5  Special Tests:    Positive: Tamayo', supraspinatus (empty can), and Speed's    Negative: Neer's, crossed arm adduction, and Yergason's     Imaging was personally reviewed and interpreted by me.   EXAM: XR SHOULDER RIGHT G/E 3 VIEWS  DATE/TIME: 10/20/2023 10:19 AM     INDICATION: Right shoulder pain, unspecified chronicity  COMPARISON: None available.                                                                       IMPRESSION: Normal joint spaces and alignment. No acute fracture.     TINA CALLE DO

## 2023-10-20 ENCOUNTER — ANCILLARY PROCEDURE (OUTPATIENT)
Dept: GENERAL RADIOLOGY | Facility: CLINIC | Age: 68
End: 2023-10-20
Payer: MEDICARE

## 2023-10-20 ENCOUNTER — OFFICE VISIT (OUTPATIENT)
Dept: ORTHOPEDICS | Facility: CLINIC | Age: 68
End: 2023-10-20
Payer: MEDICARE

## 2023-10-20 ENCOUNTER — TELEPHONE (OUTPATIENT)
Dept: FAMILY MEDICINE | Facility: CLINIC | Age: 68
End: 2023-10-20

## 2023-10-20 VITALS — WEIGHT: 179 LBS | SYSTOLIC BLOOD PRESSURE: 124 MMHG | BODY MASS INDEX: 28.46 KG/M2 | DIASTOLIC BLOOD PRESSURE: 84 MMHG

## 2023-10-20 DIAGNOSIS — M25.819 SHOULDER IMPINGEMENT: ICD-10-CM

## 2023-10-20 DIAGNOSIS — M25.511 RIGHT SHOULDER PAIN, UNSPECIFIED CHRONICITY: ICD-10-CM

## 2023-10-20 DIAGNOSIS — M67.911 TENDINOPATHY OF RIGHT ROTATOR CUFF: ICD-10-CM

## 2023-10-20 DIAGNOSIS — M25.511 RIGHT SHOULDER PAIN, UNSPECIFIED CHRONICITY: Primary | ICD-10-CM

## 2023-10-20 PROCEDURE — 99203 OFFICE O/P NEW LOW 30 MIN: CPT | Performed by: STUDENT IN AN ORGANIZED HEALTH CARE EDUCATION/TRAINING PROGRAM

## 2023-10-20 PROCEDURE — 73030 X-RAY EXAM OF SHOULDER: CPT | Mod: TC | Performed by: RADIOLOGY

## 2023-10-20 NOTE — LETTER
10/20/2023         RE: Luba Nguyen  [address] [address] [facility] [id number]        Dear Colleague,    Thank you for referring your patient, Luba Nguyen, to the [facility] SPORTS MEDICINE [facility]INIC [place]. Please see a copy of my visit note below.    Sports Medicine Clinic           ASSESSMENT and PLAN:     Dawna was seen today for pain.    Diagnoses and all orders for this visit:    Right shoulder pain, unspecified chronicity, Shoulder impingement, Tendinopathy of right rotator cuff  Chronic right shoulder pain consistent with subacromial impingement and rotator cuff tendinopathy without concern for a high grade tear given relatively preserved strength and good range of motion. Discussed treatment options including PT and CSI. Patient would like to start with PT and will follow up when she is back for [place] for consideration of a CSI if not having significant improvement.   -     Physical Therapy Referral; Future  -     topical diclofenac PRN  -     follow up in 2 months for subacromial CSI if needed    Return sooner if develops new or worsening symptoms.    Options for treatment and/or follow-up care were reviewed with the patient was actively involved in the decision making process. Patient verbalized understanding and was in agreement with the plan.    30 minutes spent on the date of the encounter doing chart reivew, history and exam, documentation and further activities as noted above with the exception of procedures.    Shanita Adams MD, [facility]  Primary Care Sports Medicine           SUBJECTIVE       Luba Nguyen is a 68 year old female presenting to clinic today with a chief complaint of right shoulder pain, referred by Mirela Ballesteros MD.  PT feels her shoulders are rolling forward, and wonders if this is a factor in her pain.    Onset: 6 month(s) ago. Reports insidious onset without acute precipitating event.  Location of Pain: right shoulder  Rating of Pain at worst:  "8/10  Rating of Pain Currently: 3/10  Worsened by: movement, external rotation  Better with: rest  Treatments tried: ibuprofen  Associated symptoms: locking or catching  Orthopedic history: NO  Relevant surgical history: NO  Social history: social history: retired    She remembers noticing it when golfing last spring. She also notices with reaching to get her seatbelt on and when reaching to get her purse out of the back seat. It comes and goes. There are some days where she doesn't feel it all. She does feel like her golf swing isn't as strong. Her pain is more of a sudden catching. It could happen 2-3 times per day on a bad day. No previous shoulder injuries or dislocations. She isn't having trouble sleeping.     No numbness or tingling.     They winter in [place]. She is leaving in 10 days and then will be back [facility].    PMH, Medications and Allergies were reviewed and updated as needed.    ROS:  As noted above otherwise negative.    Patient Active Problem List   Diagnosis     Hypertension     Hyperlipidemia     Hypothyroidism     Low serum vitamin D     GERD (gastroesophageal reflux disease)     Hyperglycemia     Low vitamin B12 level     Ischemic chest pain (H24)     Heart palpitations     SVT (supraventricular tachycardia)       Current Outpatient Medications   Medication Sig Dispense Refill     ALPRAZolam (XANAX) 0.25 MG tablet Take 1 tablet (0.25 mg) by mouth nightly as needed for anxiety (take prior to flying.) 12 tablet 0     cholecalciferol (D3-50) 1250 mcg (42384 units) capsule TAKE 1 CAPSULE BY MOUTH ONE TIME PER WEEK 12 capsule 1     cyanocobalamin (CYANOCOBALAMIN) 1000 MCG/ML injection Inject 1 mL (1,000 mcg) into the muscle every 30 days Pt will plan to start self injections and will need 12 syringes (25 Guage, 1\" needle) 3 mL 4     estrogen conj (PREMARIN) 0.625 MG tablet TAKE 1/2 TABLET BY MOUTH EVERY DAY 45 tablet 2     metoprolol tartrate (LOPRESSOR) 25 MG tablet Take 25 mg by " mouth daily       Multiple Vitamins-Minerals (SYSTANE ICAPS AREDS2) CAPS Take 1 capsule by mouth 2 times daily       pantoprazole (PROTONIX) 40 MG EC tablet Take 1 tablet (40 mg) by mouth daily 90 tablet 4     rosuvastatin (CRESTOR) 5 MG tablet Take 1 tablet (5 mg) by mouth three times a week 30 tablet 6     SYNTHROID 125 MCG tablet Take 1 tablet (125 mcg) by mouth daily 90 tablet 4     valACYclovir (VALTREX) 500 MG tablet Take 1 tablet (500 mg) by mouth daily as needed (viral outbreak) 30 tablet 3            OBJECTIVE:       Vitals:   Vitals:    10/20/23 0955   BP: 124/84   Weight: 81.2 kg (179 lb)     BMI: Body mass index is 28.46 kg/m .    Gen:  Well nourished and in no acute distress  HEENT: Extraocular movement intact  Pulm:  Breathing Comfortably. No increased respiratory effort.  Psych: Euthymic. Appropriately answers questions    MSK:   RIGHT SHOULDER  Inspection:    no swelling, bruising, discoloration, or obvious deformity or asymmetry  Palpation:    Tender about the supraspinatus insertion and infraspinatus insertion. Remainder of bony and tendinous landmarks are nontender.    Crepitus is Present  Active Range of Motion:     Abduction 1650 / FF 1650 / ER normal0 / IR L4.  Opposite arm abduction/flexion/ER full, IR L4  Strength:    Scapular plane abduction 5-/5 / ER 5/5 / IR 5-/5, painful / biceps 5-/5 / triceps 5/5  Special Tests:    Positive: Tamayo', supraspinatus (empty can), and Speed's    Negative: Neer's, crossed arm adduction, and Yergason's     Imaging was personally reviewed and interpreted by me.   EXAM: XR SHOULDER RIGHT G/E 3 VIEWS  DATE/TIME: 10/20/2023 10:19 AM     INDICATION: Right shoulder pain, unspecified chronicity  COMPARISON: None available.                                                                       IMPRESSION: Normal joint spaces and alignment. No acute fracture.     TINA CALLE, DO            Again, thank you for allowing me to participate in the care of your  patient.        Sincerely,        Shanita Adams MD

## 2023-10-20 NOTE — PATIENT INSTRUCTIONS
Follow up with PT, referral placed today.  You can call [phone] to schedule.    Buy over the counter diclofenac (voltaren) gel and apply to the affected area up to four times per day.    Please call [phone] with questions or concerns.    Shanita Adams MD, [facility]  Primary Care Sports Medicine

## 2023-10-25 ENCOUNTER — THERAPY VISIT (OUTPATIENT)
Dept: PHYSICAL THERAPY | Facility: CLINIC | Age: 68
End: 2023-10-25
Payer: MEDICARE

## 2023-10-25 DIAGNOSIS — M25.511 RIGHT SHOULDER PAIN, UNSPECIFIED CHRONICITY: ICD-10-CM

## 2023-10-25 DIAGNOSIS — M25.819 SHOULDER IMPINGEMENT: ICD-10-CM

## 2023-10-25 DIAGNOSIS — M67.911 TENDINOPATHY OF RIGHT ROTATOR CUFF: ICD-10-CM

## 2023-10-25 PROCEDURE — 97161 PT EVAL LOW COMPLEX 20 MIN: CPT | Mod: GP | Performed by: PHYSICAL THERAPIST

## 2023-10-25 PROCEDURE — 97112 NEUROMUSCULAR REEDUCATION: CPT | Mod: GP | Performed by: PHYSICAL THERAPIST

## 2023-10-25 PROCEDURE — 97110 THERAPEUTIC EXERCISES: CPT | Mod: GP | Performed by: PHYSICAL THERAPIST

## 2023-10-25 NOTE — PROGRESS NOTES
PHYSICAL THERAPY EVALUATION  Type of Visit: Evaluation    See electronic medical record for Abuse and Falls Screening details.    Subjective     Pt c/o R shoulder pain x 6 months.  Denies injury.  Pt is R handed.  Pt notes pain mostly with reaching overhead and backwards (greatest).  Pain improves with getting out of position.   Pain noted with golf and pickle ball as well.  MD order date 10/20/2023.  X-rays (-). Pt leaving for [place] next week (returning for [facility] and [place]).        Presenting condition or subjective complaint: Reaching my arm backwards and pain in right arm.  Shoulders hunching forward  Date of onset: 04/25/23 (MD order date 10/20/2023)    Relevant medical history: Heart problems; Thyroid problems   Dates & types of surgery: Gallbladder removal and hysterectomy    Prior diagnostic imaging/testing results: X-ray     Prior therapy history for the same diagnosis, illness or injury: No      Prior Level of Function  Transfers: Independent  Ambulation: Independent  ADL: Independent  IADL: Driving, Finances, Housekeeping, Laundry, Meal preparation, Medication management    Living Environment  Social support: With a significant other or spouse   Type of home: House   Stairs to enter the home: Yes 2 Is there a railing: No   Ramp: No   Stairs inside the home: Yes 16 Is there a railing: Yes   Help at home: None  Equipment owned:       Employment: No    Hobbies/Interests: Golf, pickleball    Patient goals for therapy: Reach arm without pain or fear of discomfort    Pain assessment: Pain present     Objective   SHOULDER EVALUATION  PAIN: Pain Level at Rest: 0/10  Pain Level with Use: 5/10  Pain Location: shoulder  Pain Quality: Sharp and Shooting  Pain Frequency: intermittent  Pain is Worst: daytime  Pain is Exacerbated By: reaching (greatest pain backwards), lifing  Pain is Relieved By: rest  Pain Progression: Unchanged  INTEGUMENTARY (edema, incisions): WFL  POSTURE: WFL  GAIT:   Weightbearing Status:    Assistive Device(s):   Gait Deviations:   BALANCE/PROPRIOCEPTION:   WEIGHTBEARING ALIGNMENT: Shoulder/UE WB Alignment:Elevated scapula R  ROM:   (Degrees) Left AROM Left PROM Right AROM  Right PROM   Shoulder Flexion   ERT/P    Shoulder Extension       Shoulder Abduction   ERT/P    Shoulder Adduction       Shoulder Internal Rotation T18  T9 +    Shoulder External Rotation   ERT    Shoulder Horizontal Abduction       Shoulder Horizontal Adduction       Shoulder Flexion ER       Shoulder Flexion IR       Elbow Extension       Elbow Flexion       Pain:   End feel:     STRENGTH:  R should flex 5-/5 -, abd 4+/5 +/-, ER 5-/5 -, IR 5-/5 -. Weakness mid/lower trap with UT over recruitment R  FLEXIBILITY: WFL  SPECIAL TESTS:  (+) empty can test R  PALPATION:  TTP R supraspinatus   JOINT MOBILITY:  mild post cap hypomobility noted  CERVICAL SCREEN: [facility]    Assessment & Plan   CLINICAL IMPRESSIONS  Medical Diagnosis: Shoulder impingement    Treatment Diagnosis: R shoulder pain   Impression/Assessment: Patient is a 68 year old female with R shoulder pain complaints.  The following significant findings have been identified: Pain, Decreased ROM/flexibility, Decreased strength, Impaired muscle performance, Decreased activity tolerance, and Impaired posture. These impairments interfere with their ability to perform self care tasks, recreational activities, household chores, and driving  as compared to previous level of function.     Clinical Decision Making (Complexity):  Clinical Presentation: Stable/Uncomplicated  Clinical Presentation Rationale: based on medical and personal factors listed in PT evaluation  Clinical Decision Making (Complexity): Low complexity    PLAN OF CARE  Treatment Interventions:  Modalities: Cryotherapy, Ultrasound  Interventions: Neuromuscular Re-education, Therapeutic Activity, Therapeutic Exercise    Long Term Goals     PT Goal 1  Goal Identifier: Reaching  Goal Description: Be able to reaching  overhead/behind back/out to the side pain free  Rationale:  (for independent personal hygiene;for dressing;for bathing;for meal preparation;for safe driving, hook seat belt, reach shift lever and turn signal, using both hands on steering wheel;)  Target Date: 01/25/24      Frequency of Treatment: 1x/month  Duration of Treatment: 3 month    Recommended Referrals to Other Professionals:   Education Assessment:   Learner/Method: Patient;Demonstration;Pictures/Video  Education Comments: online    Risks and benefits of evaluation/treatment have been explained.   Patient/Family/caregiver agrees with Plan of Care.     Evaluation Time:     PT Eval, Low Complexity Minutes (80977): 15       Signing Clinician: BALA Collins [facility]                                                                                   OUTPATIENT PHYSICAL THERAPY      PLAN OF TREATMENT FOR OUTPATIENT REHABILITATION   Patient's Last Name, First Name, Luba Casas YOB: 1955   Provider's Name   [facility]   Medical Record No.  [id number]     Onset Date: 04/25/23 (MD order date 10/20/2023)  Start of Care Date: 10/25/23     Medical Diagnosis:  Shoulder impingement      PT Treatment Diagnosis:  R shoulder pain Plan of Treatment  Frequency/Duration: 1x/month/ 3 month    Certification date from 10/25/23 to 01/25/24         See note for plan of treatment details and functional goals     Clifford Hagen, PT                         I CERTIFY THE NEED FOR THESE SERVICES FURNISHED UNDER        THIS PLAN OF TREATMENT AND WHILE UNDER MY CARE     (Physician attestation of this document indicates review and certification of the therapy plan).                Referring Provider:  Shanita Adams      Initial Assessment  See Epic Evaluation- Start of Care Date: 10/25/23

## 2023-10-27 ENCOUNTER — VIRTUAL VISIT (OUTPATIENT)
Dept: FAMILY MEDICINE | Facility: CLINIC | Age: 68
End: 2023-10-27
Payer: MEDICARE

## 2023-10-27 DIAGNOSIS — Z20.818 EXPOSURE TO STREP THROAT: ICD-10-CM

## 2023-10-27 DIAGNOSIS — J02.9 ACUTE PHARYNGITIS, UNSPECIFIED ETIOLOGY: Primary | ICD-10-CM

## 2023-10-27 DIAGNOSIS — Z78.0 MENOPAUSE: ICD-10-CM

## 2023-10-27 PROCEDURE — 99213 OFFICE O/P EST LOW 20 MIN: CPT | Mod: 95 | Performed by: FAMILY MEDICINE

## 2023-10-27 ASSESSMENT — ENCOUNTER SYMPTOMS
SORE THROAT: 1
COUGH: 1

## 2023-10-27 NOTE — PROGRESS NOTES
Dawna is a 68 year old who is being evaluated via a billable video visit.      How would you like to obtain your AVS? MyChart  If the video visit is dropped, the invitation should be resent by: Text to cell phone: [phone]  Will anyone else be joining your video visit? No          Assessment & Plan     Acute pharyngitis, unspecified etiology  - amoxicillin-clavulanate (AUGMENTIN) 875-125 MG tablet; Take 1 tablet by mouth 2 times daily for 10 days  Exposure to strep throat  - amoxicillin-clavulanate (AUGMENTIN) 875-125 MG tablet; Take 1 tablet by mouth 2 times daily for 10 days    EHR reviewed.   Past medical history, problem list, past surgical history, family history, social history, medications reviewed, updated, reconciled.   She notes exposure to strep throat.   We reviewed option of throat swab for strep throat or treatment due to exposure. She requests treatment. Augmentin prescribed. Reviewed possible adverse affects. Complete entire series. Reviewed supportive care. Follow up with PCP if no better or any change in symptoms.        Damian Sykes MD  [facility]   Dawna is a 68 year old, presenting for the following health issues:  Pharyngitis (Sore throat started a few days ago, lymph nodes swollen, hard to swallow, getting ) and Cough (Cough that is getting better, mucus )      10/27/2023    11:58 AM   Additional Questions   Roomed by Kiera       Pharyngitis   Associated symptoms include cough.   Cough  Associated symptoms include sore throat.   History of Present Illness       Reason for visit:  I think i have Strep  Symptom onset:  1-3 days ago  Symptoms include:  Sore throat with white spots and  headache  tired and sweaty  Symptom intensity:  Severe  Symptom progression:  Worsening  Had these symptoms before:  Yes  Has tried/received treatment for these symptoms:  Yes  Previous treatment was successful:  Yes  Prior treatment description:  Prescription    She eats  "2-3 servings of fruits and vegetables daily.She consumes 0 sweetened beverage(s) daily.She exercises with enough effort to increase her heart rate 20 to 29 minutes per day.  She exercises with enough effort to increase her heart rate 4 days per week.   She is taking medications regularly.     [age] old female with history of hypertension, SVT, hashimoto's thyroiditis, hyperlipidemia seen with concerns of sore throat and cough. Everyone in her family has similar symptoms, so and daughter were treated for strep. Her throat is very sore, she had a cough too but that is improving, just feels like something is stuck in her throat as well. Her lymph notes feel swollen. No fever but temperature was 99. She feels very tired, was cold and then sweaty, yesterday. She has had strep throat before and worried this is it again. She has a trip on Monday and really wants this treated since she will be away. Over the counter pain medicine is not really helping as much as it should.     Review of Systems   HENT:  Positive for sore throat.    Respiratory:  Positive for cough.           Objective    Vitals - Patient Reported  Weight (Patient Reported): 78.5 kg (173 lb)  Height (Patient Reported): 167.6 cm (5' 6\")  BMI (Based on Pt Reported Ht/Wt): 27.92  Pain Score: Extreme Pain (9)        Physical Exam   GENERAL: Healthy, alert and no distress  EYES: Eyes grossly normal to inspection.  No discharge or erythema, or obvious scleral/conjunctival abnormalities.  RESP: No audible wheeze, cough, or visible cyanosis.  No visible retractions or increased work of breathing.    SKIN: Visible skin clear. No significant rash, abnormal pigmentation or lesions.  NEURO: Cranial nerves grossly intact.  Mentation and speech appropriate for age.  PSYCH: Mentation appears normal, affect normal/bright, judgement and insight intact, normal speech and appearance well-groomed.      Video-Visit Details    Type of service:  Video Visit  Video Start " Time: 12:48  Video End Time: 12:55    Originating Location (pt. Location): Home    Distant Location (provider location):  On-site  Platform used for Video Visit: Alden

## 2023-11-20 ENCOUNTER — TELEPHONE (OUTPATIENT)
Dept: FAMILY MEDICINE | Facility: CLINIC | Age: 68
End: 2023-11-20

## 2023-11-20 ENCOUNTER — ALLIED HEALTH/NURSE VISIT (OUTPATIENT)
Dept: FAMILY MEDICINE | Facility: CLINIC | Age: 68
End: 2023-11-20
Payer: MEDICARE

## 2023-11-20 DIAGNOSIS — R79.89 LOW VITAMIN B12 LEVEL: Primary | ICD-10-CM

## 2023-11-20 PROCEDURE — 99207 PR NO CHARGE NURSE ONLY: CPT

## 2023-11-20 PROCEDURE — 96372 THER/PROPH/DIAG INJ SC/IM: CPT | Performed by: INTERNAL MEDICINE

## 2023-11-20 RX ADMIN — CYANOCOBALAMIN 1000 MCG: 1000 INJECTION, SOLUTION INTRAMUSCULAR; SUBCUTANEOUS at 09:18

## 2023-11-20 NOTE — PROGRESS NOTES
Prior to immunization administration, verified patients identity using patient s name and date of birth. Please see Immunization Activity for additional information.     Screening Questionnaire for Adult Immunization    Are you sick today?   No   Do you have allergies to medications, food, a vaccine component or latex?   Yes   Have you ever had a serious reaction after receiving a vaccination?   No   Do you have a long-term health problem with heart, lung, kidney, or metabolic disease (e.g., diabetes), asthma, a blood disorder, no spleen, complement component deficiency, a cochlear implant, or a spinal fluid leak?  Are you on long-term aspirin therapy?   No   Do you have cancer, leukemia, HIV/AIDS, or any other immune system problem?   No   Do you have a parent, brother, or sister with an immune system problem?   No   In the past 3 months, have you taken medications that affect  your immune system, such as prednisone, other steroids, or anticancer drugs; drugs for the treatment of rheumatoid arthritis, Crohn s disease, or psoriasis; or have you had radiation treatments?   No   Have you had a seizure, or a brain or other nervous system problem?   No   During the past year, have you received a transfusion of blood or blood    products, or been given immune (gamma) globulin or antiviral drug?   No   For women: Are you pregnant or is there a chance you could become       pregnant during the next month?   No   Have you received any vaccinations in the past 4 weeks?   No     Immunization questionnaire was positive for at least one answer.  Notified Nazanin Rodriguez, .DAVID,CNP    I have reviewed the following standing orders: Not Applicable; Order were already placed prior to ancillary visit    Patient instructed to remain in clinic for 15 minutes afterwards, and to report any adverse reactions.     Screening performed by Lisa A. Magill, CMA on 11/20/2023 at 9:16 AM.

## 2023-11-20 NOTE — TELEPHONE ENCOUNTER
Symptoms    Describe your symptoms: Fullness pain in stomach after she eat, doesn't matter what she eats.  It is affecting her eating habits, is losing weight.      Any pain: Yes:     How long have you been having symptoms: 2 weeks, seems to be getting worse    Have you been seen for this:  No    Appointment offered?: Yes:     Triage offered?: Yes:     Home remedies tried: Trying PPI, not helping.      Preferred Pharmacy:   [facility]/pharmacy #[id number] - [facility] [address]  [address] [facility] [address]  Phone: [phone] Fax: [phone]      Could we send this information to you in [employer] or would you prefer to receive a phone call?:   Patient would prefer a phone call   Okay to leave a detailed message?: Yes at Cell number on file:    Telephone Information:   Mobile [phone]

## 2023-11-21 ENCOUNTER — ANCILLARY PROCEDURE (OUTPATIENT)
Dept: CT IMAGING | Facility: CLINIC | Age: 68
End: 2023-11-21
Attending: INTERNAL MEDICINE
Payer: MEDICARE

## 2023-11-21 DIAGNOSIS — R10.12 LEFT UPPER QUADRANT PAIN: ICD-10-CM

## 2023-11-21 PROCEDURE — 74177 CT ABD & PELVIS W/CONTRAST: CPT

## 2023-11-21 PROCEDURE — 250N000009 HC RX 250: Performed by: INTERNAL MEDICINE

## 2023-11-21 PROCEDURE — 250N000011 HC RX IP 250 OP 636: Performed by: INTERNAL MEDICINE

## 2023-11-21 RX ORDER — IOPAMIDOL 755 MG/ML
92 INJECTION, SOLUTION INTRAVASCULAR ONCE
Status: COMPLETED | OUTPATIENT
Start: 2023-11-21 | End: 2023-11-21

## 2023-11-21 RX ADMIN — SODIUM CHLORIDE 40 ML: 9 INJECTION, SOLUTION INTRAVENOUS at 15:32

## 2023-11-21 RX ADMIN — IOPAMIDOL 92 ML: 755 INJECTION, SOLUTION INTRAVENOUS at 15:32

## 2023-11-22 ENCOUNTER — TRANSFERRED RECORDS (OUTPATIENT)
Dept: HEALTH INFORMATION MANAGEMENT | Facility: CLINIC | Age: 68
End: 2023-11-22
Payer: MEDICARE

## 2023-11-22 LAB
ALT SERPL-CCNC: 28 IU/L (ref 0–32)
AST SERPL-CCNC: 28 IU/L (ref 0–40)

## 2023-11-27 ENCOUNTER — THERAPY VISIT (OUTPATIENT)
Dept: PHYSICAL THERAPY | Facility: CLINIC | Age: 68
End: 2023-11-27
Payer: MEDICARE

## 2023-11-27 DIAGNOSIS — M67.911 TENDINOPATHY OF RIGHT ROTATOR CUFF: ICD-10-CM

## 2023-11-27 DIAGNOSIS — M25.819 SHOULDER IMPINGEMENT: ICD-10-CM

## 2023-11-27 DIAGNOSIS — M25.511 RIGHT SHOULDER PAIN, UNSPECIFIED CHRONICITY: Primary | ICD-10-CM

## 2023-11-27 PROCEDURE — 97110 THERAPEUTIC EXERCISES: CPT | Mod: GP | Performed by: PHYSICAL THERAPIST

## 2023-11-27 PROCEDURE — 97112 NEUROMUSCULAR REEDUCATION: CPT | Mod: GP | Performed by: PHYSICAL THERAPIST

## 2023-12-05 DIAGNOSIS — E06.3 HASHIMOTO'S THYROIDITIS: ICD-10-CM

## 2023-12-05 NOTE — TELEPHONE ENCOUNTER
Recommend appt sooner than March. Please assist with provider, consider any acute care provider.   Thanks.     Mirela Ballesteros MD  Internal Medicine  electronically signed

## 2023-12-06 DIAGNOSIS — E06.3 HASHIMOTO'S THYROIDITIS: ICD-10-CM

## 2023-12-06 RX ORDER — LEVOTHYROXINE SODIUM 125 MCG
125 TABLET ORAL DAILY
Qty: 90 TABLET | Refills: 1 | OUTPATIENT
Start: 2023-12-06

## 2023-12-08 ENCOUNTER — MYC MEDICAL ADVICE (OUTPATIENT)
Dept: INTERNAL MEDICINE | Facility: CLINIC | Age: 68
End: 2023-12-08
Payer: MEDICARE

## 2023-12-11 ENCOUNTER — TRANSFERRED RECORDS (OUTPATIENT)
Dept: HEALTH INFORMATION MANAGEMENT | Facility: CLINIC | Age: 68
End: 2023-12-11
Payer: MEDICARE

## 2023-12-12 ENCOUNTER — MYC MEDICAL ADVICE (OUTPATIENT)
Dept: FAMILY MEDICINE | Facility: CLINIC | Age: 68
End: 2023-12-12
Payer: MEDICARE

## 2023-12-12 NOTE — TELEPHONE ENCOUNTER
Course reviewed  hospital records reviewed  admission orders were placed for following medical problems     A/P   1- Alcohol withdrawl    with delerium   on detox protocol    2- liver cirrhosis   alcohol induced  LFTs high    3- Hypokalemia= replaced  will monitor electrolytes.     4- Thrombocytopenia=  No bleeding episodes  will monitor platelets.      ORDERS   Medications (11) Active  Scheduled: (4)  folic acid 1 mg Tab  1 mg 1 tab, PO, qDay  multiple vitamin with minerals Tab  1 tab, PO, qDay  pantoprazole 40 mg IV Vial  40 mg 1 vial, IV Push, qDayACB  thiamine 100 mg Tab  100 mg 1 tab, PO, qDay  Continuous: (1)  thiamine Vial 100 mg + multiple vitamin IV 10 mL + folic acid Vial 1 mg + mag sulfate 1 g/2 mL Vial  1,000 mL, IV, 100 mL/hr  PRN: (6)  LORazepam 2 mg/mL Vial  1 mg 0.5 mL, IV Push, q30min  LORazepam 2 mg/mL Vial  2 mg 1 mL, IV Push, q30min  LORazepam 2 mg/mL Vial  4 mg 2 mL, IV Push, q30min  LORazepam 2 mg/mL Vial  6 mg 3 mL, IV Push, q30min  morphine 2 mg/mL PF Inj 1 mL  0.5 mg 0.25 mL, IV Push, q4hr  ondansetron 4 mg/2 mL Vial  4 mg 2 mL, IV Push, q4hr        Exam    Vital Signs (last 24 hrs)_____ Last Charted___________Minimum____________ Maximum____________  Temp    98.6  (AUG 31 07:41) 98.0  (AUG 30 16:22) 98.9  (AUG 30 20:53)  Heart Rate   82  (AUG 31 07:41) 78  (AUG 30 16:35) H 115 (AUG 30 14:11)  Resp Rate       16  (AUG 31 07:41) 14  (AUG 30 20:53) 18  (AUG 30 13:00)  SBP    H 143 (AUG 31 07:41) 118  (AUG 30 20:53) H 150 (AUG 31 00:33)  DBP    81  (AUG 31 07:41) 68  (AUG 30 20:53) 86  (AUG 31 05:05)    Gen=  no distress  HENT:No facial weakness or neck swelling  Neck: supple  Respiratory: clear   CV: S1S2  GI:Soft non tender. No abdominal distention. Bowel sounds present  G-U= No bladder distention, flank tenderness.  Musculoskeletal:No swelling with erythema and tenderness in joints  Mental Status:Awake   no seizuers or paralysis   Skin= No hematoma   Ext= No Calf tenderness    No  Sent Mychart.  Can almost always get in with extender @ [facility] same week- should look into those providers if patient calls/responds.   bluish discoloration of toes   Labs (Last four charted values)  WBC                  L 3.3 (AUG 31) 4.5 (AUG 30)   Hgb                  12.2 (AUG 31) 13.0 (AUG 30)   Hct                  35 (AUG 31) 37 (AUG 30)   Plt                  L 83 (AUG 31) L 97 (AUG 30)   Na                   136 (AUG 31) 140 (AUG 30)   K                    C 2.7 (AUG 31) C 2.5 (AUG 30)   CO2                  24 (AUG 31) 23 (AUG 30)   Cl                   103 (AUG 31) 101 (AUG 30)   Cr                   0.72 (AUG 31) 0.73 (AUG 30)   BUN                  6 (AUG 31) 6 (AUG 30)   Glucose              H 113 (AUG 31) 95 (AUG 30)   Ca                   L 7.7 (AUG 31) L 7.9 (AUG 30)     Discussed with patient, care explained.

## 2023-12-18 ENCOUNTER — ALLIED HEALTH/NURSE VISIT (OUTPATIENT)
Dept: FAMILY MEDICINE | Facility: CLINIC | Age: 68
End: 2023-12-18
Payer: MEDICARE

## 2023-12-18 DIAGNOSIS — E53.8 VITAMIN B12 DEFICIENCY (NON ANEMIC): Primary | ICD-10-CM

## 2023-12-18 PROCEDURE — 99207 PR NO CHARGE NURSE ONLY: CPT

## 2023-12-18 PROCEDURE — 96372 THER/PROPH/DIAG INJ SC/IM: CPT | Performed by: INTERNAL MEDICINE

## 2023-12-18 RX ADMIN — CYANOCOBALAMIN 1000 MCG: 1000 INJECTION, SOLUTION INTRAMUSCULAR; SUBCUTANEOUS at 10:14

## 2023-12-22 ENCOUNTER — TRANSFERRED RECORDS (OUTPATIENT)
Dept: HEALTH INFORMATION MANAGEMENT | Facility: CLINIC | Age: 68
End: 2023-12-22

## 2023-12-22 ENCOUNTER — HOSPITAL ENCOUNTER (OUTPATIENT)
Dept: MAMMOGRAPHY | Facility: CLINIC | Age: 68
Discharge: HOME | End: 2023-12-22
Admitting: INTERNAL MEDICINE
Payer: MEDICARE

## 2023-12-22 DIAGNOSIS — Z12.31 OTHER SCREENING MAMMOGRAM: ICD-10-CM

## 2023-12-22 PROCEDURE — 77067 SCR MAMMO BI INCL CAD: CPT

## 2024-01-12 DIAGNOSIS — Z78.0 ASYMPTOMATIC MENOPAUSE: ICD-10-CM

## 2024-01-12 RX ORDER — CHOLECALCIFEROL (VITAMIN D3) 1250 MCG
CAPSULE ORAL
Qty: 12 CAPSULE | Refills: 1 | OUTPATIENT
Start: 2024-01-12

## 2024-01-15 ENCOUNTER — TELEPHONE (OUTPATIENT)
Dept: FAMILY MEDICINE | Facility: CLINIC | Age: 69
End: 2024-01-15
Payer: MEDICARE

## 2024-01-15 NOTE — CONFIDENTIAL NOTE
Patient Quality Outreach    Patient is due for the following:   IVD  -  IVD Follow-up Visit    Next Steps:   Schedule a office visit for ivd     Type of outreach:    Sent [employer] message.      Questions for provider review:    None           Junior Hawkins MA

## 2024-01-18 ENCOUNTER — ALLIED HEALTH/NURSE VISIT (OUTPATIENT)
Dept: FAMILY MEDICINE | Facility: CLINIC | Age: 69
End: 2024-01-18
Payer: MEDICARE

## 2024-01-18 DIAGNOSIS — E53.8 VITAMIN B12 DEFICIENCY (NON ANEMIC): Primary | ICD-10-CM

## 2024-01-18 PROCEDURE — 99207 PR NO CHARGE NURSE ONLY: CPT

## 2024-01-18 PROCEDURE — 96372 THER/PROPH/DIAG INJ SC/IM: CPT | Performed by: INTERNAL MEDICINE

## 2024-01-18 RX ADMIN — CYANOCOBALAMIN 1000 MCG: 1000 INJECTION, SOLUTION INTRAMUSCULAR; SUBCUTANEOUS at 08:55

## 2024-02-02 NOTE — PROGRESS NOTES
DISCHARGE  Reason for Discharge: Patient has failed to schedule further appointments.    Equipment Issued: none    Discharge Plan: Patient to continue home program.    Referring Provider:  Shanita Adams       11/27/23 0500   Appointment Info   Signing clinician's name / credentials Clifford Hagen PT   Total/Authorized Visits 4   Visits Used 2   Medical Diagnosis Shoulder impingement   PT Tx Diagnosis R shoulder pain   Quick Adds Certification   Progress Note/Certification   Start of Care Date 10/25/23   Onset of illness/injury or Date of Surgery 04/25/23  (MD order date 10/20/2023)   Therapy Frequency 1x/month   Predicted Duration 3 month   Certification date from 10/25/23   Certification date to 01/25/24   Progress Note Completed Date 10/25/23   PT Goal 1   Goal Identifier Reaching   Goal Description Be able to reaching overhead/behind back/out to the side pain free   Rationale   (for independent personal hygiene;for dressing;for bathing;for meal preparation;for safe driving, hook seat belt, reach shift lever and turn signal, using both hands on steering wheel;)   Target Date 01/25/24   Subjective Report   Subjective Report Noting less pain.  Feeling stronger, thao HEP well   Objective Measures   Objective Measures Objective Measure 1;Objective Measure 2   Objective Measure 1   Objective Measure Pain 6/10 at IE   Details pain 3-4/10 at greatest   Objective Measure 2   Objective Measure ERT vs ERP with IR/ext.  Improved scap stab control   Treatment Interventions (PT)   Interventions Therapeutic Procedure/Exercise;Neuromuscular Re-education   Therapeutic Procedure/Exercise   Therapeutic Procedures: strength, endurance, ROM, flexibillity minutes (70462) 20   PTRx Ther Proc 1 Prone Arm Raise #1   PTRx Ther Proc 1 - Details 20x mod/max scap stab cuing   PTRx Ther Proc 2 Shoulder Scaption Full Can   PTRx Ther Proc 2 - Details 20x AG   PTRx Ther Proc 3 Shoulder Horizontal Abduction Standing   PTRx Ther Proc 3 -  Details 20x AG min/mod scap stab cuing   Skilled Intervention postural/scap stab cuing to ensure proper mm engagment   PTRx Ther Proc 4 Standing Posterior Capsule Stretch A/B    PTRx Ther Proc 4 - Details 5x 10 sec hold decreased tightness noted after   PTRx Ther Proc 5 Shoulder Theraband Internal Rotation   PTRx Ther Proc 5 - Details 10x GTB mod scap stab cuing   PTRx Ther Proc 6 Shoulder Theraband External Rotation   PTRx Ther Proc 6 - Details 10x GTB mod scap stab and smaller ROM cuing   Neuromuscular Re-education   Neuromuscular re-ed of mvmt, balance, coord, kinesthetic sense, posture, proprioception minutes (25725) 10   Neuro Re-ed 1 sitting postural ed   PTRx Neuro Re-ed 1 Shoulder Theraband Rows   PTRx Neuro Re-ed 1 - Details 20x GTB   Education   Learner/Method Patient;Demonstration;Pictures/Video   Education Comments online   Plan   Home program See PTRx   Total Session Time   Timed Code Treatment Minutes 30   Total Treatment Time (sum of timed and untimed services) 30

## 2024-02-07 DIAGNOSIS — Z78.0 ASYMPTOMATIC MENOPAUSE: ICD-10-CM

## 2024-02-07 RX ORDER — CHOLECALCIFEROL (VITAMIN D3) 1250 MCG
CAPSULE ORAL
Qty: 12 CAPSULE | Refills: 1 | OUTPATIENT
Start: 2024-02-07

## 2024-02-12 ENCOUNTER — ALLIED HEALTH/NURSE VISIT (OUTPATIENT)
Dept: FAMILY MEDICINE | Facility: CLINIC | Age: 69
End: 2024-02-12
Payer: MEDICARE

## 2024-02-12 DIAGNOSIS — R79.89 LOW VITAMIN B12 LEVEL: Primary | ICD-10-CM

## 2024-02-12 PROCEDURE — 96372 THER/PROPH/DIAG INJ SC/IM: CPT | Performed by: INTERNAL MEDICINE

## 2024-02-12 PROCEDURE — 99207 PR NO CHARGE NURSE ONLY: CPT

## 2024-02-12 RX ADMIN — CYANOCOBALAMIN 1000 MCG: 1000 INJECTION, SOLUTION INTRAMUSCULAR; SUBCUTANEOUS at 14:07

## 2024-02-12 NOTE — PROGRESS NOTES
Clinic Administered Medication Documentation      Injectable Medication Documentation    Is there an active order (written within the past 365 days, with administrations remaining, not ) in the chart? Yes.     Patient was given Cyanocobalamin (B-12). Prior to medication administration, verified patient's identity using patient s name and date of birth. Please see MAR and medication order for additional information. Patient instructed to remain in clinic for 15 minutes and report any adverse reaction to staff immediately.    Vial/Syringe: Single dose vial. Was entire vial of medication used? Yes  Was this medication supplied by the patient? No  Is this a medication the patient will need to receive again? Yes. Verified that the patient has refills remaining in their prescription.    The following medication was given:     MEDICATION: Vitamin B12 1000mcg  ROUTE: IM  SITE: Deltoid - Left  DOSE: 1000mcg  LOT #: [id number]  :  Abrahan  EXPIRATION DATE:  2024    Phyllis Taveras CMA

## 2024-02-23 ENCOUNTER — TELEPHONE (OUTPATIENT)
Dept: FAMILY MEDICINE | Facility: CLINIC | Age: 69
End: 2024-02-23
Payer: MEDICARE

## 2024-02-23 NOTE — TELEPHONE ENCOUNTER
Patient calling and concerned if B12 appt still ok since Dr. Ballesteros gone.  Discussed can still have her shot.  Discussed new order in future will be needed.  Also worried about her medications.  Discussed other providers will be covering for Dr. Ballesteros.  Hetal Francis RN

## 2024-03-03 DIAGNOSIS — Z78.0 ASYMPTOMATIC MENOPAUSE: ICD-10-CM

## 2024-03-04 RX ORDER — CHOLECALCIFEROL (VITAMIN D3) 1250 MCG
CAPSULE ORAL
Qty: 12 CAPSULE | Refills: 1 | Status: SHIPPED | OUTPATIENT
Start: 2024-03-04 | End: 2024-09-25

## 2024-03-12 ENCOUNTER — ALLIED HEALTH/NURSE VISIT (OUTPATIENT)
Dept: FAMILY MEDICINE | Facility: CLINIC | Age: 69
End: 2024-03-12
Payer: MEDICARE

## 2024-03-12 DIAGNOSIS — E53.8 VITAMIN B12 DEFICIENCY (NON ANEMIC): Primary | ICD-10-CM

## 2024-03-12 PROCEDURE — 96372 THER/PROPH/DIAG INJ SC/IM: CPT | Performed by: INTERNAL MEDICINE

## 2024-03-12 PROCEDURE — 99207 PR NO CHARGE NURSE ONLY: CPT

## 2024-03-12 RX ADMIN — CYANOCOBALAMIN 1000 MCG: 1000 INJECTION, SOLUTION INTRAMUSCULAR; SUBCUTANEOUS at 09:12

## 2024-03-13 ENCOUNTER — OFFICE VISIT (OUTPATIENT)
Dept: INTERNAL MEDICINE | Facility: CLINIC | Age: 69
End: 2024-03-13
Payer: MEDICARE

## 2024-03-13 VITALS
TEMPERATURE: 98.4 F | HEART RATE: 76 BPM | WEIGHT: 174 LBS | SYSTOLIC BLOOD PRESSURE: 124 MMHG | RESPIRATION RATE: 16 BRPM | DIASTOLIC BLOOD PRESSURE: 78 MMHG | BODY MASS INDEX: 27.31 KG/M2 | HEIGHT: 67 IN | OXYGEN SATURATION: 97 %

## 2024-03-13 DIAGNOSIS — L60.9 LONGITUDINAL NAIL RIDGE: Primary | ICD-10-CM

## 2024-03-13 DIAGNOSIS — R53.83 OTHER FATIGUE: ICD-10-CM

## 2024-03-13 DIAGNOSIS — E78.2 MIXED HYPERLIPIDEMIA: ICD-10-CM

## 2024-03-13 DIAGNOSIS — I47.10 SVT (SUPRAVENTRICULAR TACHYCARDIA) (H): ICD-10-CM

## 2024-03-13 DIAGNOSIS — R73.03 PREDIABETES: ICD-10-CM

## 2024-03-13 DIAGNOSIS — E06.3 HYPOTHYROIDISM DUE TO HASHIMOTO'S THYROIDITIS: ICD-10-CM

## 2024-03-13 DIAGNOSIS — E78.5 HYPERLIPIDEMIA LDL GOAL <100: ICD-10-CM

## 2024-03-13 DIAGNOSIS — F40.243 ANXIETY WITH FLYING: ICD-10-CM

## 2024-03-13 LAB
ALBUMIN SERPL BCG-MCNC: 4.5 G/DL (ref 3.5–5.2)
ALP SERPL-CCNC: 73 U/L (ref 40–150)
ALT SERPL W P-5'-P-CCNC: 24 U/L (ref 0–50)
ANION GAP SERPL CALCULATED.3IONS-SCNC: 13 MMOL/L (ref 7–15)
AST SERPL W P-5'-P-CCNC: 30 U/L (ref 0–45)
BASOPHILS # BLD AUTO: 0 10E3/UL (ref 0–0.2)
BASOPHILS NFR BLD AUTO: 1 %
BILIRUB SERPL-MCNC: 0.4 MG/DL
BUN SERPL-MCNC: 16.2 MG/DL (ref 8–23)
CALCIUM SERPL-MCNC: 9.6 MG/DL (ref 8.8–10.2)
CHLORIDE SERPL-SCNC: 101 MMOL/L (ref 98–107)
CREAT SERPL-MCNC: 0.65 MG/DL (ref 0.51–0.95)
DEPRECATED HCO3 PLAS-SCNC: 24 MMOL/L (ref 22–29)
EGFRCR SERPLBLD CKD-EPI 2021: >90 ML/MIN/1.73M2
EOSINOPHIL # BLD AUTO: 0.1 10E3/UL (ref 0–0.7)
EOSINOPHIL NFR BLD AUTO: 1 %
ERYTHROCYTE [DISTWIDTH] IN BLOOD BY AUTOMATED COUNT: 12.4 % (ref 10–15)
GLUCOSE SERPL-MCNC: 118 MG/DL (ref 70–99)
HBA1C MFR BLD: 6.1 % (ref 0–5.6)
HCT VFR BLD AUTO: 43 % (ref 35–47)
HGB BLD-MCNC: 14.1 G/DL (ref 11.7–15.7)
IMM GRANULOCYTES # BLD: 0 10E3/UL
IMM GRANULOCYTES NFR BLD: 0 %
LYMPHOCYTES # BLD AUTO: 1.8 10E3/UL (ref 0.8–5.3)
LYMPHOCYTES NFR BLD AUTO: 29 %
MCH RBC QN AUTO: 30.1 PG (ref 26.5–33)
MCHC RBC AUTO-ENTMCNC: 32.8 G/DL (ref 31.5–36.5)
MCV RBC AUTO: 92 FL (ref 78–100)
MONOCYTES # BLD AUTO: 0.3 10E3/UL (ref 0–1.3)
MONOCYTES NFR BLD AUTO: 4 %
NEUTROPHILS # BLD AUTO: 3.9 10E3/UL (ref 1.6–8.3)
NEUTROPHILS NFR BLD AUTO: 65 %
PLATELET # BLD AUTO: 219 10E3/UL (ref 150–450)
POTASSIUM SERPL-SCNC: 4.3 MMOL/L (ref 3.4–5.3)
PROT SERPL-MCNC: 7.4 G/DL (ref 6.4–8.3)
RBC # BLD AUTO: 4.69 10E6/UL (ref 3.8–5.2)
SODIUM SERPL-SCNC: 138 MMOL/L (ref 135–145)
WBC # BLD AUTO: 6 10E3/UL (ref 4–11)

## 2024-03-13 PROCEDURE — 80053 COMPREHEN METABOLIC PANEL: CPT | Performed by: INTERNAL MEDICINE

## 2024-03-13 PROCEDURE — 36415 COLL VENOUS BLD VENIPUNCTURE: CPT | Performed by: INTERNAL MEDICINE

## 2024-03-13 PROCEDURE — 85025 COMPLETE CBC W/AUTO DIFF WBC: CPT | Performed by: INTERNAL MEDICINE

## 2024-03-13 PROCEDURE — 99213 OFFICE O/P EST LOW 20 MIN: CPT | Performed by: INTERNAL MEDICINE

## 2024-03-13 PROCEDURE — 83036 HEMOGLOBIN GLYCOSYLATED A1C: CPT | Performed by: INTERNAL MEDICINE

## 2024-03-13 RX ORDER — ROSUVASTATIN CALCIUM 5 MG/1
5 TABLET, COATED ORAL
Qty: 90 TABLET | Refills: 1 | Status: SHIPPED | OUTPATIENT
Start: 2024-03-14 | End: 2024-04-10

## 2024-03-13 RX ORDER — ALPRAZOLAM 0.25 MG
0.25 TABLET ORAL
Qty: 20 TABLET | Refills: 0 | Status: SHIPPED | OUTPATIENT
Start: 2024-03-13

## 2024-03-13 RX ORDER — LEVOTHYROXINE SODIUM 100 UG/1
100 TABLET ORAL DAILY
COMMUNITY
Start: 2023-12-11

## 2024-03-13 RX ORDER — ASPIRIN 81 MG/1
81 TABLET ORAL DAILY
COMMUNITY
End: 2024-09-25

## 2024-03-13 NOTE — PROGRESS NOTES
"Subjective   Dawna is a 68 year old, presenting for the following health issues:  Nail Problem        3/13/2024     9:35 AM   Additional Questions   Roomed by Deb     History of Present Illness       Reason for visit:  Changes in fingernails horizontal ridges and white areas changing shape and extending into my lower fingernails  Symptom onset:  3-4 weeks ago  Symptoms include:  My endocrinologist changed my thyroid medication i thought it was a symptom of that but she didn't think it was and said to see my primary Dr. My primary Dr. recently left her practice.  Symptom intensity:  Mild  Symptom progression:  Worsening  Had these symptoms before:  No    She eats 2-3 servings of fruits and vegetables daily.She consumes 0 sweetened beverage(s) daily.She exercises with enough effort to increase her heart rate 20 to 29 minutes per day.  She exercises with enough effort to increase her heart rate 5 days per week.   She is taking medications regularly.       Objective    /78 (BP Location: Right arm, Patient Position: Sitting, Cuff Size: Adult Regular)   Pulse 76   Temp 98.4  F (36.9  C)   Resp 16   Ht 1.689 m (5' 6.5\")   Wt 78.9 kg (174 lb)   LMP  (LMP Unknown)   SpO2 97%   BMI 27.66 kg/m    Body mass index is 27.66 kg/m .  Physical Exam   Transverse vail ridges, with 6 or 7 mm of healthy nail growing in behind it.      ASSESSMENT AND PLAN    Dawna comes in today with a concern about her fingernails and the possibility that there could be some metabolic problem accounting for horizontal ridging, which was called out to her by her manicurist.    Dawna works closely with Dr. Ale Puckett at [facility], and Dr. Puckett is working on adjusting Dawna's Synthroid dose which is currently 100 mcg taken 6 out of 7 days/week.  Dawna generally feels well, no problems with heat or cold intolerance.  Dawna also works with [facility] because of supraventricular " tachycardia and intermittent left bundle branch block, no longer takes flecainide, but does take metoprolol.  Dawna eats what sounds like a well-rounded American style diet, and has no history of gastrointestinal surgery except for cholecystectomy.  No reason to suspect any problems with malabsorption.  Her thyroid condition is her only endocrinology issue in addition to mild prediabetes where she is run an A1c level of about 6.04 years.  Takes rosuvastatin for cholesterol.    Horizontal ridging of fingernails, which appears to be about a year ago, based upon the fact that there is 6 or 7 mm of healthy nail growing in behind it.  Dawna recalls having had COVID-19 infection maybe a year ago.  Figuring that fingernails grow approximately 1 mm/week, maybe that is an explanation.  She does not recall any other severe illnesses.    Lets check some general metabolic parameters today including a comprehensive metabolic panel, blood cell counts, A1c to monitor prediabetes.  I told her that kidney function will be reflected within the comprehensive metabolic panel.    She told me that her skin seems to be okay otherwise.  General skin has been dry, but humidity has been very low these days, so she should use plenty of moisturizer.    My sense is that her general health actually pretty good, and I do not think that her nails reflect any serious underlying problem.    Signed Electronically by: MARA FARAH MD

## 2024-04-10 ENCOUNTER — PATIENT OUTREACH (OUTPATIENT)
Dept: FAMILY MEDICINE | Facility: CLINIC | Age: 69
End: 2024-04-10

## 2024-04-10 ENCOUNTER — OFFICE VISIT (OUTPATIENT)
Dept: FAMILY MEDICINE | Facility: CLINIC | Age: 69
End: 2024-04-10
Payer: MEDICARE

## 2024-04-10 VITALS
DIASTOLIC BLOOD PRESSURE: 82 MMHG | HEIGHT: 67 IN | RESPIRATION RATE: 16 BRPM | BODY MASS INDEX: 27.47 KG/M2 | HEART RATE: 64 BPM | TEMPERATURE: 98.3 F | SYSTOLIC BLOOD PRESSURE: 130 MMHG | WEIGHT: 175 LBS | OXYGEN SATURATION: 97 %

## 2024-04-10 DIAGNOSIS — E78.5 HYPERLIPIDEMIA LDL GOAL <100: ICD-10-CM

## 2024-04-10 DIAGNOSIS — K22.70 BARRETT'S ESOPHAGUS WITHOUT DYSPLASIA: ICD-10-CM

## 2024-04-10 DIAGNOSIS — Z78.0 MENOPAUSE: ICD-10-CM

## 2024-04-10 PROCEDURE — 96372 THER/PROPH/DIAG INJ SC/IM: CPT | Performed by: INTERNAL MEDICINE

## 2024-04-10 PROCEDURE — 99213 OFFICE O/P EST LOW 20 MIN: CPT

## 2024-04-10 RX ORDER — ROSUVASTATIN CALCIUM 5 MG/1
5 TABLET, COATED ORAL
Qty: 90 TABLET | Refills: 1 | Status: SHIPPED | OUTPATIENT
Start: 2024-04-11 | End: 2024-09-25

## 2024-04-10 RX ORDER — RESPIRATORY SYNCYTIAL VIRUS VACCINE 120MCG/0.5
0.5 KIT INTRAMUSCULAR ONCE
Qty: 1 EACH | Refills: 0 | Status: CANCELLED | OUTPATIENT
Start: 2024-04-10 | End: 2024-04-10

## 2024-04-10 RX ORDER — PANTOPRAZOLE SODIUM 40 MG/1
40 TABLET, DELAYED RELEASE ORAL DAILY
Qty: 90 TABLET | Refills: 4 | Status: SHIPPED | OUTPATIENT
Start: 2024-04-10

## 2024-04-10 RX ADMIN — CYANOCOBALAMIN 1000 MCG: 1000 INJECTION, SOLUTION INTRAMUSCULAR; SUBCUTANEOUS at 08:36

## 2024-04-10 ASSESSMENT — PAIN SCALES - GENERAL: PAINLEVEL: NO PAIN (0)

## 2024-04-10 NOTE — LETTER
Josemanuel Toney,     Thank you for choosing [facility] today for your health care needs.     [facility] is transforming primary care  At [facility], we re dedicated to constantly improve how we serve the health care needs of our patients and communities. We re currently making changes to the way we deliver care.     Changes you ll notice include:  An emphasis on building a relationship with a primary care provider  Access to a PAL (patient advocate and liaison) to help guide you with your care needs  Appointment lengths tailored to your specific needs and greater access to a care team to help you and your provider improve and maintain your health and well-being  Improved online access to your care team    Benefits of a primary care provider  If you don t have a designated primary care provider, we encourage you to get to know our care team online and find a provider you d like to see. Most of our providers have a short video on their online provider page. Visit [email or web address] to explore our providers and locations.    Benefits of having a primary care provider include:    They get to know you - your health history, family history and goals, making it easier to make a health plan together.   You get to know them - making health-related conversations and decisions easier    Primary care doctors help you when you re sick or hurt - but also focus on keeping you healthy with preventive care and screenings.    A doctor who sees you regularly is more likely to notice changes in your health.   You ll be connected to a broad care team who partners with your provider to support you.    Patient Advocate Liaison (PAL)   To help make sure you get the right care, at the right time, we include PALs, or Patient Advocate Liaisons, as part of your care team. Your PAL will be your first line of contact. They ll advocate for your needs and help you navigate our services, connecting you with care team members and community  resources to ensure your care is well coordinated. You may be introduced to a PAL in an upcoming visit.     Expanded care team access with tailored appointment lengths  Depending on your health care needs, you may have longer or shorter appointments and see additional care team providers - including Medication Therapy Management (MTM) pharmacists, diabetes educators, behavioral health clinicians, or social workers. At times, they may be included in your visit with your provider, or you may see them individually.     Online access to your health care records and care team  [email or web address] is our online tool that makes it easy to see your health care information and communicate with your care team.     [email or web address] allows you to:   View your health maintenance plan so you know when you re due for a preventive screening  Send secure messages to your care team  View your health history and visit summaries   Schedule appointments   Complete questionnaires and eCheck-in before appointments    Get care from your provider with an e-visit    View and pay your bill     Sign up at [email or web address]. Once you have an account, you also can download the mobile kenia.     Connecting to fast and convenient care  When you need fast, convenient care - consider one of the following options:     Video Visit: A convenient care option for visiting with your provider out of the comfort of your own home. Most of the things you come to the clinic to address with your provider can now be done virtually through a video. This includes your chronic medication follow up, questions or concerns you may have, and even your annual Medicare Wellness Visit.     Phone Visit: Another convenient option for follow up of common problems that may require a more in-depth discussion with your provider.     E-visit: When you need acute care quickly, or have a quick question about your medication, an E-visit is completed through [email or web address] and your provider will respond  within one business day.      Take care,     Elaine RODRIGUEZ RN   PAL (Patient Advocate Liaison)  [facility]  [phone]

## 2024-04-10 NOTE — PROGRESS NOTES
Clinic Administered Medication Documentation      Injectable Medication Documentation    Is there an active order (written within the past 365 days, with administrations remaining, not ) in the chart? Yes.     Patient was given Cyanocobalamin (B-12). Prior to medication administration, verified patient's identity using patient s name and date of birth. Please see MAR and medication order for additional information. Patient instructed to remain in clinic for 15 minutes and report any adverse reaction to staff immediately.    Vial/Syringe: Single dose vial. Was entire vial of medication used? Yes  Was this medication supplied by the patient? No  Is this a medication the patient will need to receive again? No - not necessary to check for refills remaining.  Amelie Cruz, CMA

## 2024-04-10 NOTE — TELEPHONE ENCOUNTER
SB3/5 PAL welcome letter sent     NISHA Fong (Patient Advocate Liaison)  [facility]  [phone]

## 2024-04-10 NOTE — PROGRESS NOTES
"  Assessment & Plan     (E78.5) Hyperlipidemia LDL goal <100  Comment: tolerating Rosuvastatin 3 times per week; reviewed LDL and goals of treatment.  Plan: rosuvastatin (CRESTOR) 5 MG tablet          (K22.70) Cueto's esophagus without dysplasia  Comment: Dx 1990, EGD every 3 years; last done in 5/11/2023- reviewed; Pantoprazole ( PPI); large hiatal hernia and polyp  Plan: pantoprazole (PROTONIX) 40 MG EC tablet          (Z78.0) Menopause  Comment: S/p hysterectomy. Currently on premarin for symptom management. Doing well without adverse side effects.  Plan: estrogen conj (PREMARIN) 0.625 MG tablet    BMI  Estimated body mass index is 27.82 kg/m  as calculated from the following:    Height as of this encounter: 1.689 m (5' 6.5\").    Weight as of this encounter: 79.4 kg (175 lb).       Follow up in September for physical; sooner with any acute concerns.    Arnulfo Toney is a 68 year old, presenting for the following health issues:  Recheck Medication, Medication Injection (B12- current order in chart), and GI Problem (Constipation and diahrrea)        4/10/2024     8:05 AM   Additional Questions   Roomed by Amelie SANCHEZ MA     History of Present Illness       Hypothyroidism:     Since last visit, patient describes the following symptoms::  Constipation, Fatigue and Weight gain    Weight gain::  5 lbs.    She eats 2-3 servings of fruits and vegetables daily.She consumes 0 sweetened beverage(s) daily.She exercises with enough effort to increase her heart rate 20 to 29 minutes per day.  She exercises with enough effort to increase her heart rate 5 days per week.   She is taking medications regularly.     Constipation and diarrhea since returning from recent trip. Mild diarrhea.      Review of Systems  Constitutional, HEENT, cardiovascular, pulmonary, GI, , musculoskeletal, neuro, skin, endocrine and psych systems are negative, except as otherwise noted.        Objective    /82 (BP Location: Right arm, " "Patient Position: Sitting, Cuff Size: Adult Regular)   Pulse 64   Temp 98.3  F (36.8  C) (Temporal)   Resp 16   Ht 1.689 m (5' 6.5\")   Wt 79.4 kg (175 lb)   LMP  (LMP Unknown)   SpO2 97%   BMI 27.82 kg/m    Body mass index is 27.82 kg/m .  Physical Exam   GENERAL: alert and no distress  RESP: no respiratory distress, no audible wheezes  MS: no gross musculoskeletal defects noted      Signed Electronically by: Karla Urias PA-C    "

## 2024-04-23 ENCOUNTER — NURSE TRIAGE (OUTPATIENT)
Dept: FAMILY MEDICINE | Facility: CLINIC | Age: 69
End: 2024-04-23
Payer: MEDICARE

## 2024-04-23 DIAGNOSIS — R73.09 ELEVATED GLUCOSE: Primary | ICD-10-CM

## 2024-04-23 NOTE — TELEPHONE ENCOUNTER
Karla Urias PA-C- Please review and advise.     Pended glucometer and test strips, if appropriate.     Nurse Triage SBAR    Is this a 2nd Level Triage? NO    Situation: Itching on palm of hand and feet off and on. She is wondering if this may be related to having possible elevated blood sugars and would like a prescription for new glucometer.     Background: GERD, Cueto's esophagus without dysplasia, hyperlipidemia, hyperglycemia, supraventricular tachycardia and intermittent left bundle branch block, HTN, shoulder impingement. Hgb A1c on 3/13 6.1.     Assessment:     Pt reports on/off itching In the middle of palms of bilateral hands and feet. Primarily in the morning. This began to occur last week in the middle of the night. Around 4/18.    She did have some sweating at night when noticing the itching. She reports the itching as mild and that it does not interfere with activity. She has no open areas.     She states she may have noticed that she has been urinating more recently. That is the only other symptom she can recall.     She denies fever or rash.     Protocol Recommended Disposition:   No disposition on file.    Recommendation:  Given care advise for home care.     Routed to provider    Does the patient meet one of the following criteria for ADS visit consideration? 16+ years old, with an [facility] PCP     TIP  Providers, please consider if this condition is appropriate for management at one of our Acute and Diagnostic Services sites.     If patient is a good candidate, please use dotphrase <dot>triageresponse and select Refer to ADS to document.  Received a call on PAL RN line,     -Pt calling to report symptoms of itching on palms of hand and feet. She is wanting to refill glucometer testing supplies. She is requesting a call back.     NISHA Fong (Patient Advocate Liaison)  [facility]  [phone]    Reason for Disposition   Mild localized itching    Additional Information    "Negative: Athlete's foot suspected (e.g., itchy rash of feet, especially 3rd-4th web spaces)   Negative: Insect bites suspected   Negative: Jock Itch suspected (e.g., itchy rash on inner thighs near genital area)   Negative: Pubic lice suspected (e.g., genital itching and lice or nits are seen)   Negative: Poison ivy, oak, or sumac rash suspected (e.g., itchy rash after contact with poison ivy)   Negative: Genital itching - female   Negative: Genital itching - male   Negative: Rectal (anus) itching   Negative: Localized rash also present   Negative: Patient sounds very sick or weak to the triager   Negative: Looks infected (e.g., spreading redness, red streak, pus)   Negative: MODERATE-SEVERE local itching (i.e., interferes with work, school, activities) and not improved after 24 hours of hydrocortisone cream   Negative: Patient wants to be seen   Negative: Cause unknown and present > 7 days    Answer Assessment - Initial Assessment Questions  1. DESCRIPTION: \"Describe the itching you are having.\" \"Where is it located?\"      In the middle of palms of bilateral hands and feet. It occurs off and on. Primarily in the morning.   2. SEVERITY: \"How bad is it?\"     - MILD: Doesn't interfere with normal activities.    - MODERATE-SEVERE: Interferes with work, school, sleep, or other activities.       Mild  3. SCRATCHING: \"Are there any scratch marks? Bleeding?\"      No  4. ONSET: \"When did the itching begin?\"       Last week in the middle of the night. Around 4/18.   5. CAUSE: \"What do you think is causing the itching?\"       Possible elevated blood sugar.   6. OTHER SYMPTOMS: \"Do you have any other symptoms?\"       Night time sweating when palms were itching. Possible increased urination.   7. PREGNANCY: \"Is there any chance you are pregnant?\" \"When was your last menstrual period?\"      no    Protocols used: Itching - [id number]    Elaine RODRIGUEZ RN   PAL (Patient Advocate Liaison)  [facility]  [phone] " [phone]

## 2024-04-24 NOTE — TELEPHONE ENCOUNTER
Signed pended orders for glucometer and strips. I would recommend that if symptoms are not improving or if there is any worsening she consider doing a visit to further discuss.    Thanks!  Karla Urias PA-C

## 2024-04-24 NOTE — TELEPHONE ENCOUNTER
Called and spoke with pt,     Relayed message below from Karla.     Pt verbalizes understanding and is agreeable with plan. She states she has not had any more itching, however will call to schedule an appointment if needed.     Elaine RODRIGUEZ RN   PAL (Patient Advocate Liaison)  [facility]  [phone]

## 2024-05-08 ENCOUNTER — ALLIED HEALTH/NURSE VISIT (OUTPATIENT)
Dept: FAMILY MEDICINE | Facility: CLINIC | Age: 69
End: 2024-05-08
Payer: MEDICARE

## 2024-05-08 DIAGNOSIS — E53.8 VITAMIN B12 DEFICIENCY (NON ANEMIC): Primary | ICD-10-CM

## 2024-05-08 PROCEDURE — 99207 PR NO CHARGE NURSE ONLY: CPT

## 2024-05-08 PROCEDURE — 96372 THER/PROPH/DIAG INJ SC/IM: CPT | Performed by: INTERNAL MEDICINE

## 2024-05-08 RX ADMIN — CYANOCOBALAMIN 1000 MCG: 1000 INJECTION, SOLUTION INTRAMUSCULAR; SUBCUTANEOUS at 08:59

## 2024-05-08 NOTE — PROGRESS NOTES
Clinic Administered Medication Documentation      Injectable Medication Documentation    Is there an active order (written within the past 365 days, with administrations remaining, not ) in the chart? Yes.     Patient was given Cyanocobalamin (B-12). Prior to medication administration, verified patient's identity using patient s name and date of birth. Please see MAR and medication order for additional information. Patient instructed to remain in clinic for 15 minutes and report any adverse reaction to staff immediately.    Vial/Syringe: Single dose vial. Was entire vial of medication used? Yes  Was this medication supplied by the patient? No  Is this a medication the patient will need to receive again? Yes. Verified that the patient has refills remaining in their prescription.  Veronica Llamas, CMA

## 2024-06-10 ENCOUNTER — ALLIED HEALTH/NURSE VISIT (OUTPATIENT)
Dept: FAMILY MEDICINE | Facility: CLINIC | Age: 69
End: 2024-06-10
Payer: MEDICARE

## 2024-06-10 DIAGNOSIS — R79.89 LOW VITAMIN B12 LEVEL: Primary | ICD-10-CM

## 2024-06-10 PROCEDURE — 99207 PR NO CHARGE NURSE ONLY: CPT

## 2024-06-10 PROCEDURE — 96372 THER/PROPH/DIAG INJ SC/IM: CPT | Performed by: INTERNAL MEDICINE

## 2024-06-10 RX ORDER — CYANOCOBALAMIN 1000 UG/ML
1000 INJECTION, SOLUTION INTRAMUSCULAR; SUBCUTANEOUS
Qty: 3 ML | Refills: 4 | Status: CANCELLED | OUTPATIENT
Start: 2024-06-10

## 2024-06-10 RX ADMIN — CYANOCOBALAMIN 1000 MCG: 1000 INJECTION, SOLUTION INTRAMUSCULAR; SUBCUTANEOUS at 09:07

## 2024-06-10 NOTE — PROGRESS NOTES
Clinic Administered Medication Documentation      Injectable Medication Documentation    Is there an active order (written within the past 365 days, with administrations remaining, not ) in the chart? Yes.     Patient was given Cyanocobalamin (B-12). Prior to medication administration, verified patient's identity using patient s name and date of birth. Please see MAR and medication order for additional information. Patient instructed to remain in clinic for 15 minutes and report any adverse reaction to staff immediately.    Vial/Syringe: Single dose vial. Was entire vial of medication used? Yes  Was this medication supplied by the patient? No  Is this a medication the patient will need to receive again? Yes. Verified that the patient has refills remaining in their prescription.  Marlee Chamorro CMA ([facility])

## 2024-07-08 ENCOUNTER — MYC MEDICAL ADVICE (OUTPATIENT)
Dept: FAMILY MEDICINE | Facility: CLINIC | Age: 69
End: 2024-07-08
Payer: MEDICARE

## 2024-07-09 ENCOUNTER — NURSE TRIAGE (OUTPATIENT)
Dept: FAMILY MEDICINE | Facility: CLINIC | Age: 69
End: 2024-07-09
Payer: MEDICARE

## 2024-07-09 NOTE — TELEPHONE ENCOUNTER
S-(situation): Received call from patient, returning call to speak with triage r/t SPEEDELO message.     B-(background): Patient MyChart message 07/08/24: Rocky Acosta, I've been running a fever off and on for about 4 days. It was low last week 96.1 and I was feeling pretty lousy, but now I'm having chills and very fatigued. My temperature is up to 99.8 tonight. My heart rate is up during the night and  I'm having trouble sleeping and not much of an appetite.   I'm wondering if I could get in to see you? I thought I would shake it by now. Taking Tylenol or Motrin nightly. Took a COVID test it was negative.    A-(assessment): Current temp 97.4. Patient denies any chills at this time. Patient continues to feel fatigue and lack of appetite. Patient reports she is drinking plenty of water. HR last night was 73bpm while sleeping and temp was increased 0.8 from the previous night. 86bpm HR now. Denies any heart palpitations. Patient notes she felt lightheaded this morning when she was in the shower, this lasted 15-20min. Patient drank water and had a banana, felt better after this. Patient notes she had some diarrhea last week. No current diarrhea. No current lightheadedness. Patient feels she is urinating more during the day, notes she is drinking more water than usual.     R-(recommendations): Per protocol, reviewed care advice under care tab. RN advised temp and HR are within normal reference ranges. RN advised if any new symptoms develop (if feeling lightheaded again, if fever develops, etc) to call back and ask to speak with any triage nurse. Patient verbalized understanding and agreed with plan.       Reason for Disposition   MILD weakness or fatigue with acute minor illness (e.g., colds)    Additional Information   Negative: SEVERE difficulty breathing (e.g., struggling for each breath, speaks in single words)   Negative: Shock suspected (e.g., cold/pale/clammy skin, too weak to stand, low BP, rapid pulse)    Negative: Difficult to awaken or acting confused (e.g., disoriented, slurred speech)   Negative: Fainted > 15 minutes ago and still feels too weak or dizzy to stand   Negative: SEVERE weakness (i.e., unable to walk or barely able to walk, requires support) and new-onset or worsening   Negative: Sounds like a life-threatening emergency to the triager   Negative: Weakness of the face, arm or leg on one side of the body   Negative: Has diabetes mellitus and weakness from low blood sugar (i.e., < 60 mg/dL or 3.5 mmol/L)   Negative: Recent heat exposure, suspected cause of weakness   Negative: Vomiting is main symptom   Negative: Diarrhea is main symptom   Negative: Difficulty breathing   Negative: Heart beating < 50 beats per minute OR > 140 beats per minute   Negative: Extra heartbeats, irregular heart beating, or heart is beating very fast (i.e., 'palpitations')   Negative: Follows large amount of bleeding (e.g., from vomiting, rectum, vagina) (Exception: Small transient weakness from sight of a small amount blood.)   Negative: Black or tarry bowel movements   Negative: MODERATE weakness or fatigue from poor fluid intake with no improvement after 2 hours of rest and fluids   Negative: Drinking very little and dehydration suspected (e.g., no urine > 12 hours, very dry mouth, very lightheaded)   Negative: Patient sounds very sick or weak to the triager   Negative: Taking a medicine that could cause weakness (e.g., blood pressure medications, diuretics)   Negative: MODERATE weakness (i.e., interferes with work, school, normal activities) and cause unknown (Exceptions: Weakness from acute minor illness or from poor fluid intake; weakness is chronic and not worse.)   Negative: Fever > 103 F  (39.4 C) and not able to get the Fever down using CARE ADVICE   Negative: Fever > 100.0 F (37.8 C) and bedridden (e.g., CVA, chronic illness, recovering from surgery)   Negative: Fever > 101 F (38.3 C) and over 60 years of age    "Negative: Fever > 100.0 F (37.8 C) and diabetes mellitus or weak immune system (e.g., HIV positive, cancer chemo, splenectomy, organ transplant, chronic steroids)   Negative: Pale skin (pallor)   Negative: MODERATE weakness (i.e., interferes with work, school, normal activities) and persists > 3 days   Negative: Patient wants to be seen   Negative: MILD weakness (i.e., does not interfere with ability to work, go to school, normal activities) and persists > 1 week   Negative: Fatigue (i.e., tires easily, decreased energy) and persists > 1 week   Negative: Weakness is a chronic symptom (recurrent or ongoing AND lasting > 4 weeks)   Negative: Fatigue is a chronic symptom (recurrent or ongoing AND present > 4 weeks)    Answer Assessment - Initial Assessment Questions  1. DESCRIPTION: \"Describe how you are feeling.\"      Fatigued   2. SEVERITY: \"How bad is it?\"  \"Can you stand and walk?\"    - MILD (0-3): Feels weak or tired, but does not interfere with work, school or normal activities.    - MODERATE (4-7): Able to stand and walk; weakness interferes with work, school, or normal activities.    - SEVERE (8-10): Unable to stand or walk; unable to do usual activities.      Mild-mod  3. ONSET: \"When did these symptoms begin?\" (e.g., hours, days, weeks, months)      4 days ago  4. CAUSE: \"What do you think is causing the weakness or fatigue?\" (e.g., not drinking enough fluids, medical problem, trouble sleeping)      unsure  5. NEW MEDICINES:  \"Have you started on any new medicines recently?\" (e.g., opioid pain medicines, benzodiazepines, muscle relaxants, antidepressants, antihistamines, neuroleptics, beta blockers)      no  6. OTHER SYMPTOMS: \"Do you have any other symptoms?\" (e.g., chest pain, fever, cough, SOB, vomiting, diarrhea, bleeding, other areas of pain)      no  7. PREGNANCY: \"Is there any chance you are pregnant?\" \"When was your last menstrual period?\"      no    Protocols used: Weakness (Generalized) and " Fatigue-A-NENA YING RN 7/9/2024 at 10:48 AM

## 2024-07-09 NOTE — TELEPHONE ENCOUNTER
See my chart - attempted to call patient, phone rang no option for leaving voicemail     Josy Blanca, Registered Nurse  [facility]

## 2024-07-10 ENCOUNTER — ALLIED HEALTH/NURSE VISIT (OUTPATIENT)
Dept: FAMILY MEDICINE | Facility: CLINIC | Age: 69
End: 2024-07-10
Payer: MEDICARE

## 2024-07-10 DIAGNOSIS — E53.8 VITAMIN B 12 DEFICIENCY: Primary | ICD-10-CM

## 2024-07-10 PROCEDURE — 96372 THER/PROPH/DIAG INJ SC/IM: CPT | Performed by: INTERNAL MEDICINE

## 2024-07-10 PROCEDURE — 99207 PR NO CHARGE NURSE ONLY: CPT

## 2024-07-10 RX ADMIN — CYANOCOBALAMIN 1000 MCG: 1000 INJECTION, SOLUTION INTRAMUSCULAR; SUBCUTANEOUS at 09:13

## 2024-07-10 NOTE — PROGRESS NOTES
Clinic Administered Medication Documentation      Injectable Medication Documentation    Is there an active order (written within the past 365 days, with administrations remaining, not ) in the chart? Yes.     Patient was given Cyanocobalamin (B-12). Prior to medication administration, verified patient's identity using patient s name and date of birth. Please see MAR and medication order for additional information. Patient instructed to remain in clinic for 15 minutes and report any adverse reaction to staff immediately.    Vial/Syringe: Single dose vial. Was entire vial of medication used? Yes  Was this medication supplied by the patient? No  Is this a medication the patient will need to receive again? Yes. Verified that the patient has refills remaining in their prescription.  Marlee Chamorro CMA ([facility]) on 7/10/2024 at 9:18 AM

## 2024-07-19 NOTE — TELEPHONE ENCOUNTER
Filled 4/22/19 90/3  Lakshmi Rea RN Care Connection Triage/Medication Refill     Physical Therapy Visit    Visit Type: Daily Treatment Note  Visit: 3  Referring Provider: Cliff Gomez DO  Medical Diagnosis (from order): M72.2 - Plantar fasciitis of right foot     SUBJECTIVE                                                                                                               Patient verbally consented to allow observer present during session. (Student (Lee))    Patient notes that her foot has been feeling rather good since last session. States she is considering going for a longer walk outside today which she has not been doing previously due to pain. States her home exercises have been going well and by doing them before getting out of bed there is minimal pain with walking in the morning.  Functional Change: She is pretty close to being back to her prior level of function. Still has some concerns with walking longer distances.      OBJECTIVE                                                                                                                                     Treatment     Therapeutic Exercise  NuStep seat 5, arms 0 x 5 min  Standing gastroc stretch x 30 seconds each leg  Standing soleus stretch x 30 seconds each leg    Standing heel raises on stairs  x 10 with slow eccentric  Standing heel raises, 2 feet up, down with only right foot 2 x 10  Step downs from 8 inch stair focusing on eccentric control x 10 each leg    Heel walks in parallel bars x 2  Toe walks in parallel bars x 2     Standing plantar fascia stretch 2 x 20 seconds    Single leg balance in parallel bars 2 x 20 seconds each leg  Single leg balance moving contralateral leg in a clock x 3 each leg  Review updated HEP     Skilled input: verbal instruction/cues, tactile instruction/cues and demonstration    Writer verbally educated and received verbal consent for hand placement, positioning of patient, and techniques to be performed today from patient for clothing adjustments for techniques, therapist  position for techniques and hand placement and palpation for techniques as described above and how they are pertinent to the patient's plan of care.  Home Exercise Program  Access Code: [email or web address]  URL: [email or web address]/  Date: 07/19/2024  Prepared by: Christina Gardner    Exercises  - Seated Ankle Alphabet  - 2 x daily - 7 x weekly - 2 sets - 1 reps  - Standing Gastroc Stretch  - 2 x daily - 7 x weekly - 2 sets - 30 hold  - Standing Soleus Stretch  - 2 x daily - 7 x weekly - 2 sets - 30 hold  - Seated Plantar Fascia Stretch  - 1 x daily - 7 x weekly - 3 sets - 20 hold  - Toe Yoga - Alternating Great Toe and Lesser Toe Extension  - 1 x daily - 4-5 x weekly - 2 sets - 10 reps  - Towel Scrunches  - 1 x daily - 4-5 x weekly - 2 sets - 10 reps  - Standing Bilateral Heel Raise on Step  - 1 x daily - 4-5 x weekly - 2 sets - 10 reps  - Standing Eccentric Heel Raise  - 1 x daily - 4-5 x weekly - 2 sets - 10 reps  - Single Leg Stance  - 2 x daily - 7 x weekly - 2 sets - 30 hold      ASSESSMENT                                                                                                            Patient shows continued improvement performing her functional daily activities with minimal to no pain. She brought up the idea of potentially making this her last appointment. Discussed concerns with discharging too early as symptoms may return with increased activity. Decreased appointments down to 1x per week so she can trial a short period of self management. Reviewed and updated HEP. If she continues to do well, may consider discharge at upcoming visits.   Education:   - Results of above outlined education: Verbalizes understanding and Needs reinforcement    PLAN                                                                                                                           Suggestions for next session as indicated: Progress per plan of care  Review HEP, discuss self management  between sessions and potential discharge        I was in the immediate presence of the student and directed the student’s performance of the services. I am responsible for all treatment, assessment, documentation, and billing rendered for this patient.   Christina Gardner, PT        Therapy procedure time and total treatment time can be found documented on the Time Entry flowsheet

## 2024-08-05 ENCOUNTER — ALLIED HEALTH/NURSE VISIT (OUTPATIENT)
Dept: FAMILY MEDICINE | Facility: CLINIC | Age: 69
End: 2024-08-05
Payer: MEDICARE

## 2024-08-05 DIAGNOSIS — E53.8 VITAMIN B 12 DEFICIENCY: Primary | ICD-10-CM

## 2024-08-05 PROCEDURE — 96372 THER/PROPH/DIAG INJ SC/IM: CPT | Performed by: INTERNAL MEDICINE

## 2024-08-05 PROCEDURE — 99207 PR NO CHARGE NURSE ONLY: CPT

## 2024-08-05 RX ADMIN — CYANOCOBALAMIN 1000 MCG: 1000 INJECTION, SOLUTION INTRAMUSCULAR; SUBCUTANEOUS at 09:03

## 2024-08-20 ENCOUNTER — MYC MEDICAL ADVICE (OUTPATIENT)
Dept: FAMILY MEDICINE | Facility: CLINIC | Age: 69
End: 2024-08-20
Payer: MEDICARE

## 2024-08-20 ENCOUNTER — MYC REFILL (OUTPATIENT)
Dept: FAMILY MEDICINE | Facility: CLINIC | Age: 69
End: 2024-08-20
Payer: MEDICARE

## 2024-08-20 DIAGNOSIS — Z78.0 ASYMPTOMATIC MENOPAUSE: ICD-10-CM

## 2024-08-20 RX ORDER — CHOLECALCIFEROL (VITAMIN D3) 1250 MCG
CAPSULE ORAL
Qty: 12 CAPSULE | Refills: 1 | OUTPATIENT
Start: 2024-08-20

## 2024-08-20 NOTE — TELEPHONE ENCOUNTER
Medication Question or Refill        What medication are you calling about (include dose and sig)?: Vitamin D3    Preferred Pharmacy:   [facility]/pharmacy #[id number] - [place], [place] - [address]  [address] [facility] [address]  Phone: [phone] Fax: [phone]      Controlled Substance Agreement on file:   CSA -- Patient Level:    CSA: None found at the patient level.       Who prescribed the medication?: Karla Urias    Do you need a refill? Yes    When did you use the medication last?      Patient offered an appointment? No    Do you have any questions or concerns?  Yes: Pt went to renew prescription and was told prescription was not okayed.      Could we send this information to you in [employer] or would you prefer to receive a phone call?:   No preference   Okay to leave a detailed message?: Yes at Cell number on file:    Telephone Information:   Mobile [phone]

## 2024-08-29 ENCOUNTER — TRANSFERRED RECORDS (OUTPATIENT)
Dept: HEALTH INFORMATION MANAGEMENT | Facility: CLINIC | Age: 69
End: 2024-08-29
Payer: MEDICARE

## 2024-09-03 ENCOUNTER — TRANSFERRED RECORDS (OUTPATIENT)
Dept: HEALTH INFORMATION MANAGEMENT | Facility: CLINIC | Age: 69
End: 2024-09-03
Payer: MEDICARE

## 2024-09-07 ENCOUNTER — HEALTH MAINTENANCE LETTER (OUTPATIENT)
Age: 69
End: 2024-09-07

## 2024-09-13 ENCOUNTER — ALLIED HEALTH/NURSE VISIT (OUTPATIENT)
Dept: FAMILY MEDICINE | Facility: CLINIC | Age: 69
End: 2024-09-13
Payer: MEDICARE

## 2024-09-13 DIAGNOSIS — E53.8 VITAMIN B12 DEFICIENCY (NON ANEMIC): Primary | ICD-10-CM

## 2024-09-13 PROCEDURE — 96372 THER/PROPH/DIAG INJ SC/IM: CPT | Performed by: INTERNAL MEDICINE

## 2024-09-13 PROCEDURE — 99207 PR NO CHARGE NURSE ONLY: CPT

## 2024-09-13 RX ADMIN — CYANOCOBALAMIN 1000 MCG: 1000 INJECTION, SOLUTION INTRAMUSCULAR; SUBCUTANEOUS at 09:00

## 2024-09-13 NOTE — PROGRESS NOTES
Clinic Administered Medication Documentation      Injectable Medication Documentation    Is there an active order (written within the past 365 days, with administrations remaining, not ) in the chart? Yes.     Patient was given Cyanocobalamin (B-12). Prior to medication administration, verified patient's identity using patient s name and date of birth. Please see MAR and medication order for additional information. Patient instructed to remain in clinic for 15 minutes and report any adverse reaction to staff immediately.    Vial/Syringe: Single dose vial. Was entire vial of medication used? Yes  Was this medication supplied by the patient? No  Is this a medication the patient will need to receive again? Yes. Patient has no refills remaining. Patient has a annual scheduled 2024

## 2024-09-25 ENCOUNTER — OFFICE VISIT (OUTPATIENT)
Dept: PEDIATRICS | Facility: CLINIC | Age: 69
End: 2024-09-25
Payer: MEDICARE

## 2024-09-25 VITALS
BODY MASS INDEX: 27.27 KG/M2 | SYSTOLIC BLOOD PRESSURE: 124 MMHG | HEIGHT: 66 IN | OXYGEN SATURATION: 96 % | WEIGHT: 169.7 LBS | DIASTOLIC BLOOD PRESSURE: 82 MMHG | TEMPERATURE: 97.4 F | HEART RATE: 79 BPM | RESPIRATION RATE: 16 BRPM

## 2024-09-25 DIAGNOSIS — E78.5 HYPERLIPIDEMIA, UNSPECIFIED HYPERLIPIDEMIA TYPE: ICD-10-CM

## 2024-09-25 DIAGNOSIS — Z23 NEED FOR PROPHYLACTIC VACCINATION AND INOCULATION AGAINST INFLUENZA: ICD-10-CM

## 2024-09-25 DIAGNOSIS — E03.9 HYPOTHYROIDISM, UNSPECIFIED TYPE: ICD-10-CM

## 2024-09-25 DIAGNOSIS — B00.9 HSV (HERPES SIMPLEX VIRUS) INFECTION: ICD-10-CM

## 2024-09-25 DIAGNOSIS — E53.8 VITAMIN B12 DEFICIENCY (NON ANEMIC): ICD-10-CM

## 2024-09-25 DIAGNOSIS — N95.1 SYMPTOMATIC MENOPAUSAL OR FEMALE CLIMACTERIC STATES: ICD-10-CM

## 2024-09-25 DIAGNOSIS — R79.89 LOW VITAMIN B12 LEVEL: ICD-10-CM

## 2024-09-25 DIAGNOSIS — Z00.00 ROUTINE GENERAL MEDICAL EXAMINATION AT A HEALTH CARE FACILITY: Primary | ICD-10-CM

## 2024-09-25 DIAGNOSIS — R73.03 PREDIABETES: ICD-10-CM

## 2024-09-25 DIAGNOSIS — E55.9 VITAMIN D DEFICIENCY: ICD-10-CM

## 2024-09-25 PROBLEM — I47.10 SVT (SUPRAVENTRICULAR TACHYCARDIA) (H): Status: RESOLVED | Noted: 2023-08-08 | Resolved: 2024-09-25

## 2024-09-25 PROBLEM — I20.9 ISCHEMIC CHEST PAIN (H): Status: RESOLVED | Noted: 2017-05-08 | Resolved: 2024-09-25

## 2024-09-25 LAB
CHOLEST SERPL-MCNC: 218 MG/DL
EST. AVERAGE GLUCOSE BLD GHB EST-MCNC: 117 MG/DL
FASTING STATUS PATIENT QL REPORTED: YES
HBA1C MFR BLD: 5.7 % (ref 0–5.6)
HDLC SERPL-MCNC: 60 MG/DL
LDLC SERPL CALC-MCNC: 125 MG/DL
NONHDLC SERPL-MCNC: 158 MG/DL
TRIGL SERPL-MCNC: 167 MG/DL
TSH SERPL DL<=0.005 MIU/L-ACNC: 0.81 UIU/ML (ref 0.3–4.2)
VIT B12 SERPL-MCNC: 770 PG/ML (ref 232–1245)
VIT D+METAB SERPL-MCNC: 111 NG/ML (ref 20–50)

## 2024-09-25 PROCEDURE — 84443 ASSAY THYROID STIM HORMONE: CPT | Performed by: INTERNAL MEDICINE

## 2024-09-25 PROCEDURE — 83036 HEMOGLOBIN GLYCOSYLATED A1C: CPT | Performed by: INTERNAL MEDICINE

## 2024-09-25 PROCEDURE — 82607 VITAMIN B-12: CPT | Performed by: INTERNAL MEDICINE

## 2024-09-25 PROCEDURE — G0439 PPPS, SUBSEQ VISIT: HCPCS | Performed by: INTERNAL MEDICINE

## 2024-09-25 PROCEDURE — 80061 LIPID PANEL: CPT | Performed by: INTERNAL MEDICINE

## 2024-09-25 PROCEDURE — 82306 VITAMIN D 25 HYDROXY: CPT | Performed by: INTERNAL MEDICINE

## 2024-09-25 PROCEDURE — 99214 OFFICE O/P EST MOD 30 MIN: CPT | Mod: 25 | Performed by: INTERNAL MEDICINE

## 2024-09-25 PROCEDURE — 90662 IIV NO PRSV INCREASED AG IM: CPT | Performed by: INTERNAL MEDICINE

## 2024-09-25 PROCEDURE — 36415 COLL VENOUS BLD VENIPUNCTURE: CPT | Performed by: INTERNAL MEDICINE

## 2024-09-25 PROCEDURE — G0008 ADMIN INFLUENZA VIRUS VAC: HCPCS | Performed by: INTERNAL MEDICINE

## 2024-09-25 RX ORDER — ROSUVASTATIN CALCIUM 5 MG/1
5 TABLET, COATED ORAL DAILY
Qty: 90 TABLET | Refills: 3 | Status: SHIPPED | OUTPATIENT
Start: 2024-09-25

## 2024-09-25 RX ORDER — VALACYCLOVIR HYDROCHLORIDE 500 MG/1
500 TABLET, FILM COATED ORAL DAILY PRN
Qty: 30 TABLET | Refills: 3 | Status: SHIPPED | OUTPATIENT
Start: 2024-09-25

## 2024-09-25 RX ORDER — CYANOCOBALAMIN 1000 UG/ML
1000 INJECTION, SOLUTION INTRAMUSCULAR; SUBCUTANEOUS
Qty: 1 ML | Refills: 11 | Status: SHIPPED | OUTPATIENT
Start: 2024-09-25

## 2024-09-25 RX ORDER — CHOLECALCIFEROL (VITAMIN D3) 1250 MCG
CAPSULE ORAL
Qty: 12 CAPSULE | Refills: 3 | Status: SHIPPED | OUTPATIENT
Start: 2024-09-25 | End: 2024-09-27

## 2024-09-25 ASSESSMENT — PAIN SCALES - GENERAL: PAINLEVEL: MODERATE PAIN (4)

## 2024-09-25 NOTE — PROGRESS NOTES
Preventive Care Visit  [facility] JOSE LUIS Tamez MD, Internal Medicine - Pediatrics  Sep 25, 2024      Assessment & Plan     Routine general medical examination at a health care facility  Counseling as below. Influenza vaccine today.     Hyperlipidemia, unspecified hyperlipidemia type  Due for labs. Given ASCVD risk of 9.2% encouraged patient to increase her rosuvastatin to daily use to help with risk management, particularly if she is electing to continue with HRT.   - rosuvastatin (CRESTOR) 5 MG tablet; Take 1 tablet (5 mg) by mouth daily.  - Lipid panel reflex to direct LDL Non-fasting    Hypothyroidism, unspecified type  Follows with endocrine, is requesting lab work today.   - TSH with free T4 reflex    HSV (herpes simplex virus) infection  Intermittent flares, would like valtrex on hand.   - valACYclovir (VALTREX) 500 MG tablet; Take 1 tablet (500 mg) by mouth daily as needed (viral outbreak).    Symptomatic menopausal or female climacteric states  Prefers to continue HRT, reviewed risks. Given age and ASCVD risk score, discussed that she is higher risk for cardiac related complications, encouraged her to discuss risk/benefits with gynecology - may benefit from switching to patch if she prefers to continue on HRT to avoid first pass effect.   - Ob/Gyn  Referral; Future    Vitamin D deficiency  Due for labs, medication refilled.   - cholecalciferol (VITAMIN D3) 1250 mcg (92792 units) capsule; TAKE 1 CAPSULE BY MOUTH ONE TIME PER WEEK  - Vitamin D Deficiency    Vitamin B12 deficiency (non anemic)  Due for labs and medication refills. She will arrange for nurse only appts for these injections.   -- cyanocobalamin (CYANOCOBALAMIN) 1000 mcg/mL injection; Inject 1 mL (1,000 mcg) into the muscle every 30 days.  - Vitamin B12    Prediabetes  - Hemoglobin A1c              BMI  Estimated body mass index is 27.11 kg/m  as calculated from the following:    Height as of this encounter: 1.685  "m (5' 6.34\").    Weight as of this encounter: 77 kg (169 lb 11.2 oz).       Counseling  Appropriate preventive services were addressed with this patient via screening, questionnaire, or discussion as appropriate for fall prevention, nutrition, physical activity, Tobacco-use cessation, social engagement, weight loss and cognition.  Checklist reviewing preventive services available has been given to the patient.  Reviewed patient's diet, addressing concerns and/or questions.   She is at risk for psychosocial distress and has been provided with information to reduce risk.   Discussed possible causes of fatigue.     Patient Instructions   Schedule appointment with gynecology to discuss ongoing estrogen therapy.     Increase rosuvastatin to 5mg daily - let me know if you have side effects with this.     Switch B12 injections to our clinic.     Labs and flu shot today.     Arnulfo Toney is a 68 year old, presenting for the following:  Annual Visit and Establish Care        9/25/2024    10:48 AM   Additional Questions   Roomed by [facility]   Accompanied by self         Health Care Directive  Patient does not have a Health Care Directive or Living Will: Discussed advance care planning with patient; however, patient declined at this time.    HPI  Has a hx of hypothyroidism, following with endocrinology for management of thyroid medications.   Hx of vitamin D deficiency and vitamin B12 deficiency, takes monthly injections.  Sees physical therapist for R shoulder pain.   Sees cardiologist for racing heart beats. Also has LBBB. Did a stress echocardiogram in 2024. No hx of ischemia.  Hx of prediabetes, never diagnosed with DM.   Has been on HRT for some time without issues, would like to continue this.        9/20/2024   General Health   How would you rate your overall physical health? Good   Feel stress (tense, anxious, or unable to sleep) Only a little      (!) STRESS CONCERN      9/20/2024   Nutrition   Diet: Regular (no " restrictions)            9/20/2024   Exercise   Days per week of moderate/strenous exercise 4 days   Average minutes spent exercising at this level 30 min            9/20/2024   Social Factors   Frequency of gathering with friends or relatives More than three times a week   Worry food won't last until get money to buy more No   Food not last or not have enough money for food? No   Do you have housing? (Housing is defined as stable permanent housing and does not include staying ouside in a car, in a tent, in an abandoned building, in an overnight shelter, or couch-surfing.) Yes   Are you worried about losing your housing? No   Lack of transportation? No   Unable to get utilities (heat,electricity)? No            9/20/2024   Fall Risk   Fallen 2 or more times in the past year? No   Trouble with walking or balance? No             9/20/2024   Activities of Daily Living- Home Safety   Needs help with the following daily activites None of the above   Safety concerns in the home None of the above            9/20/2024   Dental   Dentist two times every year? Yes            9/20/2024   Hearing Screening   Hearing concerns? None of the above            9/20/2024   Driving Risk Screening   Patient/family members have concerns about driving No            9/20/2024   General Alertness/Fatigue Screening   Have you been more tired than usual lately? (!) YES            9/20/2024   Urinary Incontinence Screening   Bothered by leaking urine in past 6 months No            9/20/2024   TB Screening   Were you born outside of the [place]? No            Today's PHQ-2 Score:       9/25/2024    10:37 AM   PHQ-2 ( 1999 [employer])   Q1: Little interest or pleasure in doing things 0   Q2: Feeling down, depressed or hopeless 0   PHQ-2 Score 0   Q1: Little interest or pleasure in doing things Not at all   Q2: Feeling down, depressed or hopeless Not at all   PHQ-2 Score 0           9/20/2024   Substance Use   Alcohol more than 3/day or more than 7/wk No    Do you have a current opioid prescription? No   How severe/bad is pain from 1 to 10? 2/10   Do you use any other substances recreationally? No        Social History     Tobacco Use    Smoking status: Never     Passive exposure: Never    Smokeless tobacco: Never   Vaping Use    Vaping status: Never Used   Substance Use Topics    Alcohol use: Yes    Drug use: Never           12/22/2023   LAST FHS-7 RESULTS   1st degree relative breast or ovarian cancer No   Any relative bilateral breast cancer No   Any male have breast cancer No   Any ONE woman have BOTH breast AND ovarian cancer No   Any woman with breast cancer before 50yrs No   2 or more relatives with breast AND/OR ovarian cancer No   2 or more relatives with breast AND/OR bowel cancer No           Mammogram Screening - Mammogram every 1-2 years updated in Health Maintenance based on mutual decision making      History of abnormal Pap smear: Status post hysterectomy with removal of cervix and no history of CIN2 or greater or cervical cancer. Health Maintenance and Surgical History updated.       ASCVD Risk   The 10-year ASCVD risk score (Minnie VÁSQUEZ, et al., 2019) is: 9.2%    Values used to calculate the score:      Age: 68 years      Sex: Female      Is Non- : No      Diabetic: No      Tobacco smoker: No      Systolic Blood Pressure: 124 mmHg      Is BP treated: Yes      HDL Cholesterol: 60 mg/dL      Total Cholesterol: 191 mg/dL            Reviewed and updated as needed this visit by Provider                    Patient Active Problem List   Diagnosis    Hypertension    Hyperlipidemia    Hypothyroidism    Low serum vitamin D    GERD (gastroesophageal reflux disease)    Prediabetes    Low vitamin B12 level    Heart palpitations    Right shoulder pain, unspecified chronicity    Shoulder impingement    Tendinopathy of right rotator cuff     Past Surgical History:   Procedure Laterality Date    BIOPSY  5/2023    Biopsy of stomach and  colon polyps    CHOLECYSTECTOMY      lap onel    CHOLECYSTECTOMY      COLONOSCOPY  2017    DILATION AND CURETTAGE      LAPAROSCOPIC TRANSVAGINAL    HYSTERECTOMY  2009    HYSTERECTOMY TOTAL ABDOMINAL, BILATERAL SALPINGO-OOPHORECTOMY, COMBINED  01/01/2009       Social History     Tobacco Use    Smoking status: Never     Passive exposure: Never    Smokeless tobacco: Never   Substance Use Topics    Alcohol use: Yes     Family History   Problem Relation Age of Onset    Anxiety Disorder Mother     Anesthesia Reaction Mother     Thyroid Disease Mother     Hypertension Mother     Cerebrovascular Disease Mother     Anemia Mother     Mitral Regurgitation Mother     Atrial fibrillation Mother     Hyperlipidemia Father     Prostate Cancer Father     Dementia Father     Heart Failure Father         HEART DISEASE    Coronary Artery Disease Father     Thyroid Disease Sister     Diabetes Maternal Grandmother     Ovarian Cancer Paternal Grandmother     Colon Cancer Paternal Grandmother     Breast Cancer Paternal Aunt          Current providers sharing in care for this patient include:  Patient Care Team:  Karla Urias PA-C as PCP - General (Family Medicine)  Perez Leroy MD Newell, Debra Conlon, MD as Assigned PCP  Robert Winters MD as Physician (Endocrinology, Diabetes, and Metabolism)    The following health maintenance items are reviewed in Epic and correct as of today:  Health Maintenance   Topic Date Due    RSV VACCINE (1 - Risk 60-74 years 1-dose series) Never done    MEDICARE ANNUAL WELLNESS VISIT  08/02/2024    LIPID  08/02/2024    TSH W/FREE T4 REFLEX  08/21/2024    INFLUENZA VACCINE (1) 09/01/2024    COVID-19 Vaccine (6 - 2024-25 season) 09/01/2024    ANNUAL REVIEW OF HM ORDERS  10/04/2024    BMP  03/13/2025    FALL RISK ASSESSMENT  09/25/2025    MAMMO SCREENING  12/22/2025    COLORECTAL CANCER SCREENING  05/11/2026    DEXA  05/12/2026    GLUCOSE  03/13/2027    ADVANCE CARE PLANNING  08/05/2028    DTAP/TDAP/TD  "IMMUNIZATION (7 - Td or Tdap) 08/17/2030    PHQ-2 (once per calendar year)  Completed    Pneumococcal Vaccine: 65+ Years  Completed    ZOSTER IMMUNIZATION  Completed    HPV IMMUNIZATION  Aged Out    MENINGITIS IMMUNIZATION  Aged Out    RSV MONOCLONAL ANTIBODY  Aged Out    HEPATITIS C SCREENING  Discontinued         Review of Systems  Constitutional, neuro, ENT, endocrine, pulmonary, cardiac, gastrointestinal, genitourinary, musculoskeletal, integument and psychiatric systems are negative, except as otherwise noted.     Objective    Exam  /82 (BP Location: Right arm, Patient Position: Sitting, Cuff Size: Adult Regular)   Pulse 79   Temp 97.4  F (36.3  C) (Tympanic)   Resp 16   Ht 1.685 m (5' 6.34\")   Wt 77 kg (169 lb 11.2 oz)   LMP  (LMP Unknown)   SpO2 96%   BMI 27.11 kg/m     Estimated body mass index is 27.11 kg/m  as calculated from the following:    Height as of this encounter: 1.685 m (5' 6.34\").    Weight as of this encounter: 77 kg (169 lb 11.2 oz).    Physical Exam  GENERAL: alert and no distress  EYES: Eyes grossly normal to inspection, PERRL and conjunctivae and sclerae normal  HENT: ear canals and TM's normal, nose and mouth without ulcers or lesions  NECK: no adenopathy, no asymmetry, masses, or scars  RESP: lungs clear to auscultation - no rales, rhonchi or wheezes  CV: regular rate and rhythm, normal S1 S2, no S3 or S4, no murmur, click or rub, no peripheral edema  ABDOMEN: soft, nontender, no hepatosplenomegaly, no masses and bowel sounds normal  MS: no gross musculoskeletal defects noted, no edema  SKIN: no suspicious lesions or rashes  NEURO: Normal strength and tone, mentation intact and speech normal  PSYCH: mentation appears normal, affect normal/bright        9/25/2024   Mini Cog   Clock Draw Score 2 Normal   3 Item Recall 3 objects recalled   Mini Cog Total Score 5                 Signed Electronically by: Elaine Tamez MD    "

## 2024-09-25 NOTE — PATIENT INSTRUCTIONS
Schedule appointment with gynecology to discuss ongoing estrogen therapy.     Increase rosuvastatin to 5mg daily - let me know if you have side effects with this.     Switch B12 injections to our clinic.     Labs and flu shot today.

## 2024-10-02 ENCOUNTER — MYC REFILL (OUTPATIENT)
Dept: PEDIATRICS | Facility: CLINIC | Age: 69
End: 2024-10-02
Payer: MEDICARE

## 2024-10-02 DIAGNOSIS — E55.9 VITAMIN D DEFICIENCY: ICD-10-CM

## 2024-10-02 RX ORDER — FAMOTIDINE 20 MG
1 TABLET ORAL DAILY
Qty: 90 CAPSULE | Refills: 3 | OUTPATIENT
Start: 2024-10-02

## 2024-10-15 ENCOUNTER — ALLIED HEALTH/NURSE VISIT (OUTPATIENT)
Dept: PEDIATRICS | Facility: CLINIC | Age: 69
End: 2024-10-15
Payer: MEDICARE

## 2024-10-15 DIAGNOSIS — R79.89 LOW VITAMIN B12 LEVEL: Primary | ICD-10-CM

## 2024-10-15 PROCEDURE — 96372 THER/PROPH/DIAG INJ SC/IM: CPT | Performed by: INTERNAL MEDICINE

## 2024-10-15 PROCEDURE — 99207 PR NO CHARGE NURSE ONLY: CPT

## 2024-10-15 RX ADMIN — CYANOCOBALAMIN 1000 MCG: 1000 INJECTION, SOLUTION INTRAMUSCULAR; SUBCUTANEOUS at 13:58

## 2024-10-19 ENCOUNTER — MYC MEDICAL ADVICE (OUTPATIENT)
Dept: FAMILY MEDICINE | Facility: CLINIC | Age: 69
End: 2024-10-19
Payer: MEDICARE

## 2024-11-07 ENCOUNTER — VIRTUAL VISIT (OUTPATIENT)
Dept: FAMILY MEDICINE | Facility: CLINIC | Age: 69
End: 2024-11-07
Payer: MEDICARE

## 2024-11-07 DIAGNOSIS — Z79.899 CURRENT USE OF PROTON PUMP INHIBITOR: ICD-10-CM

## 2024-11-07 DIAGNOSIS — B00.9 HSV (HERPES SIMPLEX VIRUS) INFECTION: ICD-10-CM

## 2024-11-07 DIAGNOSIS — Z78.0 MENOPAUSE: ICD-10-CM

## 2024-11-07 DIAGNOSIS — R79.89 LOW SERUM VITAMIN D: Primary | ICD-10-CM

## 2024-11-07 DIAGNOSIS — M25.551 HIP PAIN, RIGHT: ICD-10-CM

## 2024-11-07 PROCEDURE — 99214 OFFICE O/P EST MOD 30 MIN: CPT | Mod: 95

## 2024-11-07 PROCEDURE — G2211 COMPLEX E/M VISIT ADD ON: HCPCS | Mod: 95

## 2024-11-07 RX ORDER — VALACYCLOVIR HYDROCHLORIDE 500 MG/1
500 TABLET, FILM COATED ORAL DAILY PRN
Qty: 30 TABLET | Refills: 3 | Status: SHIPPED | OUTPATIENT
Start: 2024-11-07

## 2024-11-07 ASSESSMENT — ACTIVITIES OF DAILY LIVING (ADL)
HOW_WOULD_YOU_RATE_YOUR_CURRENT_LEVEL_OF_FUNCTION_DURING_YOUR_USUAL_ACTIVITIES_OF_DAILY_LIVING_FROM_0_TO_100_WITH_100_BEING_YOUR_LEVEL_OF_FUNCTION_PRIOR_TO_YOUR_HIP_PROBLEM_AND_0_BEING_THE_INABILITY_TO_PERFORM_ANY_OF_YOUR_USUAL_DAILY_ACTIVITIES?: 90
RUNNING_ONE_MILE: EXTREME DIFFICULTY
SPORTS_COUNT: 9
AS_A_RESULT_OF_YOUR_KNEE_INJURY,_HOW_WOULD_YOU_RATE_YOUR_CURRENT_LEVEL_OF_DAILY_ACTIVITY?: NORMAL
PUTTING_ON_SOCKS_AND_SHOES: NO DIFFICULTY AT ALL
WALKING_FOR_APPROXIMATELY_10_MINUTES: NO DIFFICULTY AT ALL
HOW_WOULD_YOU_RATE_THE_CURRENT_FUNCTION_OF_YOUR_KNEE_DURING_YOUR_USUAL_DAILY_ACTIVITIES_ON_A_SCALE_FROM_0_TO_100_WITH_100_BEING_YOUR_LEVEL_OF_KNEE_FUNCTION_PRIOR_TO_YOUR_INJURY_AND_0_BEING_THE_INABILITY_TO_PERFORM_ANY_OF_YOUR_USUAL_DAILY_ACTIVITIES?: 100
ADL_SCORE(%): 0
SWINGING_OBJECTS_LIKE_A_GOLF_CLUB: NO DIFFICULTY AT ALL
GOING DOWN 1 FLIGHT OF STAIRS: SLIGHT DIFFICULTY
GIVING WAY, BUCKLING OR SHIFTING OF KNEE: I DO NOT HAVE THE SYMPTOM
HOW_WOULD_YOU_RATE_YOUR_CURRENT_LEVEL_OF_FUNCTION?: ABNORMAL
SITTING_FOR_15_MINUTES: MODERATE DIFFICULTY
WALK: ACTIVITY IS MINIMALLY DIFFICULT
HOS_ADL_ITEM_SCORE_TOTAL: 49
SIT WITH YOUR KNEE BENT: ACTIVITY IS NOT DIFFICULT
LIGHT_TO_MODERATE_WORK: MODERATE DIFFICULTY
GO DOWN STAIRS: ACTIVITY IS MINIMALLY DIFFICULT
DEEP SQUATTING: NO DIFFICULTY AT ALL
SPORTS_HIGHEST_POTENTIAL_SCORE: 36
GETTING_INTO_AND_OUT_OF_A_BATHTUB: NO DIFFICULTY AT ALL
STANDING FOR 15 MINUTES: SLIGHT DIFFICULTY
STIFFNESS: THE SYMPTOM AFFECTS MY ACTIVITY MODERATELY
SQUAT: ACTIVITY IS MINIMALLY DIFFICULT
WALKING_15_MINUTES_OR_GREATER: NO DIFFICULTY AT ALL
STANDING_FOR_15_MINUTES: SLIGHT DIFFICULTY
STAND: ACTIVITY IS SOMEWHAT DIFFICULT
WALKING_UP_STEEP_HILLS: MODERATE DIFFICULTY
SWELLING: I DO NOT HAVE THE SYMPTOM
WALKING_UP_STEEP_HILLS: MODERATE DIFFICULTY
WALKING_DOWN_STEEP_HILLS: SLIGHT DIFFICULTY
RISE FROM A CHAIR: ACTIVITY IS SOMEWHAT DIFFICULT
RECREATIONAL ACTIVITIES: SLIGHT DIFFICULTY
RISE FROM A CHAIR: ACTIVITY IS SOMEWHAT DIFFICULT
PAIN: THE SYMPTOM AFFECTS MY ACTIVITY SLIGHTLY
STAND: ACTIVITY IS SOMEWHAT DIFFICULT
ROLLING_OVER_IN_BED: MODERATE DIFFICULTY
GO UP STAIRS: ACTIVITY IS MINIMALLY DIFFICULT
GO UP STAIRS: ACTIVITY IS MINIMALLY DIFFICULT
SPORTS_TOTAL_ITEM_SCORE: 0
WALKING_DOWN_STEEP_HILLS: SLIGHT DIFFICULTY
WALKING_15_MINUTES_OR_GREATER: NO DIFFICULTY AT ALL
GETTING_INTO_AND_OUT_OF_A_BATHTUB: NO DIFFICULTY AT ALL
HOW_WOULD_YOU_RATE_THE_OVERALL_FUNCTION_OF_YOUR_KNEE_DURING_YOUR_USUAL_DAILY_ACTIVITIES?: NEARLY NORMAL
PLEASE_INDICATE_YOR_PRIMARY_REASON_FOR_REFERRAL_TO_THERAPY:: KNEE
KNEE_ACTIVITY_OF_DAILY_LIVING_SUM: 51
ABILITY_TO_PERFORM_ACTIVITY_WITH_YOUR_NORMAL_TECHNIQUE: SLIGHT DIFFICULTY
WALK: ACTIVITY IS MINIMALLY DIFFICULT
KNEE_ACTIVITY_OF_DAILY_LIVING_SCORE: 72.86
SQUAT: ACTIVITY IS MINIMALLY DIFFICULT
SITTING FOR 15 MINUTES: MODERATE DIFFICULTY
LIMPING: THE SYMPTOM AFFECTS MY ACTIVITY MODERATELY
GIVING WAY, BUCKLING OR SHIFTING OF KNEE: I DO NOT HAVE THE SYMPTOM
WALKING_APPROXIMATELY_10_MINUTES: NO DIFFICULTY AT ALL
GOING_DOWN_1_FLIGHT_OF_STAIRS: SLIGHT DIFFICULTY
STARTING_AND_STOPPING_QUICKLY: MODERATE DIFFICULTY
ROLLING OVER IN BED: MODERATE DIFFICULTY
DEEP_SQUATTING: NO DIFFICULTY AT ALL
LIGHT_TO_MODERATE_WORK: MODERATE DIFFICULTY
STIFFNESS: THE SYMPTOM AFFECTS MY ACTIVITY MODERATELY
AS_A_RESULT_OF_YOUR_KNEE_INJURY,_HOW_WOULD_YOU_RATE_YOUR_CURRENT_LEVEL_OF_DAILY_ACTIVITY?: NORMAL
GO DOWN STAIRS: ACTIVITY IS MINIMALLY DIFFICULT
ADL_COUNT: 17
KNEEL ON THE FRONT OF YOUR KNEE: ACTIVITY IS MINIMALLY DIFFICULT
KNEEL ON THE FRONT OF YOUR KNEE: ACTIVITY IS MINIMALLY DIFFICULT
SIT WITH YOUR KNEE BENT: ACTIVITY IS NOT DIFFICULT
RECREATIONAL_ACTIVITIES: SLIGHT DIFFICULTY
LANDING: SLIGHT DIFFICULTY
PUTTING ON SOCKS AND SHOES: NO DIFFICULTY AT ALL
LOW_IMPACT_ACTIVITIES_LIKE_FAST_WALKING: NO DIFFICULTY AT ALL
WEAKNESS: THE SYMPTOM AFFECTS MY ACTIVITY SLIGHTLY
GOING UP 1 FLIGHT OF STAIRS: SLIGHT DIFFICULTY
WALKING_INITIALLY: MODERATE DIFFICULTY
HOS_ADL_SCORE(%): 76.56
TWISTING/PIVOTING ON INVOLVED LEG: SLIGHT DIFFICULTY
STEPPING UP AND DOWN CURBS: SLIGHT DIFFICULTY
GETTING_INTO_AND_OUT_OF_AN_AVERAGE_CAR: NO DIFFICULTY AT ALL
HOW_WOULD_YOU_RATE_THE_CURRENT_FUNCTION_OF_YOUR_KNEE_DURING_YOUR_USUAL_DAILY_ACTIVITIES_ON_A_SCALE_FROM_0_TO_100_WITH_100_BEING_YOUR_LEVEL_OF_KNEE_FUNCTION_PRIOR_TO_YOUR_INJURY_AND_0_BEING_THE_INABILITY_TO_PERFORM_ANY_OF_YOUR_USUAL_DAILY_ACTIVITIES?: 100
JUMPING: EXTREME DIFFICULTY
HOS_ADL_HIGHEST_POTENTIAL_SCORE: 64
WALKING_INITIALLY: MODERATE DIFFICULTY
HOW_WOULD_YOU_RATE_YOUR_CURRENT_LEVEL_OF_FUNCTION_DURING_YOUR_USUAL_ACTIVITIES_OF_DAILY_LIVING_FROM_0_TO_100_WITH_100_BEING_YOUR_LEVEL_OF_FUNCTION_PRIOR_TO_YOUR_HIP_PROBLEM_AND_0_BEING_THE_INABILITY_TO_PERFORM_ANY_OF_YOUR_USUAL_DAILY_ACTIVITIES?: 90
WEAKNESS: THE SYMPTOM AFFECTS MY ACTIVITY SLIGHTLY
HOW_WOULD_YOU_RATE_THE_OVERALL_FUNCTION_OF_YOUR_KNEE_DURING_YOUR_USUAL_DAILY_ACTIVITIES?: NEARLY NORMAL
TWISTING/PIVOTING_ON_INVOLVED_LEG: SLIGHT DIFFICULTY
GETTING INTO AND OUT OF AN AVERAGE CAR: NO DIFFICULTY AT ALL
GOING_UP_1_FLIGHT_OF_STAIRS: SLIGHT DIFFICULTY
PAIN: THE SYMPTOM AFFECTS MY ACTIVITY SLIGHTLY
PLEASE_INDICATE_YOR_PRIMARY_REASON_FOR_REFERRAL_TO_THERAPY:: HIP
SWELLING: I DO NOT HAVE THE SYMPTOM
LIMPING: THE SYMPTOM AFFECTS MY ACTIVITY MODERATELY
STEPPING_UP_AND_DOWN_CURBS: SLIGHT DIFFICULTY
ADL_HIGHEST_POTENTIAL_SCORE: 68
ADL_TOTAL_ITEM_SCORE: 0
SPORTS_SCORE(%): 0
RAW_SCORE: 51

## 2024-11-07 NOTE — PROGRESS NOTES
Dawna is a 69 year old who is being evaluated via a billable video visit.    How would you like to obtain your AVS? MyChart  If the video visit is dropped, the invitation should be resent by: Text to cell phone: [phone]  Will anyone else be joining your video visit? No      Assessment & Plan     (R79.89) Low serum vitamin D  (primary encounter diagnosis)  (Z79.899) Current use of proton pump inhibitor  Most recent Vitamin D testing was elevated. Was on 50,000 international unit(s) of Vitamin D at the time. This was discontinued in September. Patient is concerned as her father required high dose Vitamin D weekly throughout his life. Discussed that with the elevated level it was good to reduce the dose. She would prefer to do something weekly vs daily. Did discuss that the 50,000 international unit(s) dose was too high but could do 10,000 international unit(s) once weekly but would need to recheck Vitamin D to ensure we are avoiding any Vitamin D toxicity. Vitamin D level ordered.  Plan: vitamin D3 (CHOLECALCIFEROL) 250 mcg (33220         units) capsule, Vitamin D Deficiency    (Z78.0) Menopause  Comment: S/p hysterectomy. Currently on premarin for symptom management which was started by previous PCP. Did discuss at her physical and recommendation was for GYN follow up due to ongoing hormone use. Did discuss with her again today that estrogen has risks associated with ongoing use and that given her age we may want to consult with GYN for other options or to advise for ongoing management. Will plan to refill at this time but patient is aware that we should potentially attempt a trial off medication or consider GYN follow up in the future when she returns for [place]. Will discuss further at follow up visit.  Plan: estrogen conj (PREMARIN) 0.625 MG tablet    (B00.9) HSV (herpes simplex virus) infection  Using valacyclovir PRN for flares. No recent issues.  Plan: valACYclovir (VALTREX) 500 MG tablet           (M25.273) Hip pain, right  Having right hip discomfort. Will start with physical therapy and she is aware that if not improving would recommend xray imaging and orthopedic follow up. Patient is hoping to wait to do this until she gets back from [place].  Plan: Physical Therapy  Referral            The longitudinal plan of care for the diagnosis(es)/condition(s) as documented were addressed during this visit. Due to the added complexity in care, I will continue to support Dawna in the subsequent management and with ongoing continuity of care.      Follow up in Spring for routine follow up; sooner with any acute concerns.    Subjective   Dawna is a 69 year old, presenting for the following health issues:  Recheck Medication (Premarin, Vit D and Valtrex) and Musculoskeletal Problem (/)        11/7/2024     7:10 AM   Additional Questions   Roomed by Marita Felipe     Video Start Time: 7:22 AM    History of Present Illness       Reason for visit:  Medication orders    She eats 2-3 servings of fruits and vegetables daily.She consumes 0 sweetened beverage(s) daily.She exercises with enough effort to increase her heart rate 20 to 29 minutes per day.  She exercises with enough effort to increase her heart rate 5 days per week.   She is taking medications regularly.       Medication Followup of Premarin  Taking Medication as prescribed: yes  Side Effects:  None  Medication Helping Symptoms:  yes    Pain History:  Where in your body do you have pain? Musculoskeletal problem/pain  Onset/Duration: 3 weeks  Description  Location: hip  - right  Joint Swelling: No  Redness: No  Pain: YES  Warmth: No  Intensity:  moderate  Progression of Symptoms:  intermittent  Accompanying signs and symptoms:   Fevers: No  Numbness/tingling/weakness: No  History  Trauma to the area: No  Previous evaluation:  No  Precipitating or alleviating factors:  Aggravating factors include: sitting  Therapies tried and outcome: heat, ice, and  acetaminophen    Medication Followup of Valtrex  Taking Medication as prescribed: yes  Side Effects:  None  Medication Helping Symptoms:  yes       Review of Systems  Constitutional, HEENT, cardiovascular, pulmonary, gi and gu systems are negative, except as otherwise noted.      Objective           Vitals:  No vitals were obtained today due to virtual visit.    Physical Exam   GENERAL: alert and no distress  EYES: Eyes grossly normal to inspection.  No discharge or erythema, or obvious scleral/conjunctival abnormalities.  RESP: No audible wheeze, cough, or visible cyanosis.    SKIN: Visible skin clear. No significant rash, abnormal pigmentation or lesions.  NEURO: Cranial nerves grossly intact.  Mentation and speech appropriate for age.  PSYCH: Appropriate affect, tone, and pace of words    Video-Visit Details    Type of service:  Video Visit     Video End Time: 7:37 AM    Originating Location (pt. Location): Home  Distant Location (provider location):  Off-site  Platform used for Video Visit: Alden    Signed Electronically by: Karla Urias PA-C

## 2024-11-08 ENCOUNTER — THERAPY VISIT (OUTPATIENT)
Dept: PHYSICAL THERAPY | Facility: CLINIC | Age: 69
End: 2024-11-08
Payer: MEDICARE

## 2024-11-08 DIAGNOSIS — M25.551 HIP PAIN, RIGHT: ICD-10-CM

## 2024-11-08 PROCEDURE — 97161 PT EVAL LOW COMPLEX 20 MIN: CPT | Mod: GP | Performed by: PHYSICAL THERAPIST

## 2024-11-08 PROCEDURE — 97110 THERAPEUTIC EXERCISES: CPT | Mod: GP | Performed by: PHYSICAL THERAPIST

## 2024-11-08 NOTE — PROGRESS NOTES
PHYSICAL THERAPY EVALUATION  Type of Visit: Evaluation       Fall Risk Screen:  Fall screen completed by: PT  Have you fallen 2 or more times in the past year?: No  Have you fallen and had an injury in the past year?: No  Is patient a fall risk?: No    Subjective         Pt reports she went on a trip in September and went hiking and spent a lot of time in the car, since then has struggled with R hip pain. Pain at greater trochanter and radiating down into lateral R knee. Pt got a massage and was told her IT band was very tight. Reports movement feels pretty good but gets really stiff and painful with prolonged sitting > 30 mins. Denies issues with sleeping although has been trying to sleep more on her L side to avoid pressure on R bursa.    Presenting condition or subjective complaint: (Patient-Rptd) Soreness in hip area radiating from right thigh to right knee  Date of onset: 11/07/24 (MD order)    Relevant medical history:     Dates & types of surgery:      Prior diagnostic imaging/testing results:       Prior therapy history for the same diagnosis, illness or injury: (Patient-Rptd) No        Living Environment  Social support: (Patient-Rptd) With a significant other or spouse   Type of home: (Patient-Rptd) House; 2-story   Stairs to enter the home: (Patient-Rptd) Yes (Patient-Rptd) 2 Is there a railing: (Patient-Rptd) Yes     Ramp: (Patient-Rptd) No   Stairs inside the home: (Patient-Rptd) Yes (Patient-Rptd) 16 Is there a railing: (Patient-Rptd) Yes     Help at home: (Patient-Rptd) None  Equipment owned:       Employment: (Patient-Rptd) No    Hobbies/Interests: (Patient-Rptd) Golf, playing cards, watching grandchildren    Patient goals for therapy: (Patient-Rptd) Have no pain or stiffness in my hip and leg after sitting.    Pain assessment: Pain present     Objective   HIP EVALUATION  PAIN: Pain Level at Rest: 0/10  Pain Level with Use: 6/10  INTEGUMENTARY (edema, incisions): WNL  BALANCE/PROPRIOCEPTION: Single  Leg Stance Eyes Open (seconds): 30 seconds B although more instability on R side   WEIGHTBEARING ALIGNMENT: WFL  ROM: AROM WNL  Some pain in R hip with end range ER and flexion   STRENGTH: WNL  FUNCTIONAL TESTS: Double Leg Squat: Improper use of glutes/hips and Able to perform full deep squat without pain  PALPATION:  TTP around greater trochanter and lateral R knee     Assessment & Plan   CLINICAL IMPRESSIONS  Medical Diagnosis: R hip pain    Treatment Diagnosis: R hip and knee pain   Impression/Assessment: Patient is a 69 year old female with R hip complaints.  The following significant findings have been identified: Pain, Decreased ROM/flexibility, Decreased strength, Impaired gait, Impaired muscle performance, and Decreased activity tolerance. These impairments interfere with their ability to perform self care tasks, recreational activities, and community mobility as compared to previous level of function.     Clinical Decision Making (Complexity):  Clinical Presentation: Stable/Uncomplicated  Clinical Presentation Rationale: based on medical and personal factors listed in PT evaluation  Clinical Decision Making (Complexity): Low complexity    PLAN OF CARE  Treatment Interventions:  Interventions: Gait Training, Manual Therapy, Neuromuscular Re-education, Therapeutic Activity, Therapeutic Exercise    Long Term Goals     PT Goal 1  Goal Identifier: sitting  Goal Description: pt will be able to sit for 30+ minutes without limitaiton of pain  Rationale: to maximize safety and independence with performance of ADLs and functional tasks  Target Date: 01/31/25      Frequency of Treatment: once per week  Duration of Treatment: 12 weeks    Recommended Referrals to Other Professionals:  NA  Education Assessment:   Learner/Method: Patient  Education Comments: Pt educated on PT role and plan of care    Risks and benefits of evaluation/treatment have been explained.   Patient/Family/caregiver agrees with Plan of Care.      Evaluation Time:     PT Eval, Low Complexity Minutes (91094): 20  Signing Clinician: BALA Jones [facility]                                                                                   OUTPATIENT PHYSICAL THERAPY      PLAN OF TREATMENT FOR OUTPATIENT REHABILITATION   Patient's Last Name, First Name, Luba Casas YOB: 1955   Provider's Name   [facility]   Medical Record No.  [id number]     Onset Date: 11/07/24 (MD order)  Start of Care Date: 11/08/24     Medical Diagnosis:  R hip pain      PT Treatment Diagnosis:  R hip and knee pain Plan of Treatment  Frequency/Duration: once per week/ 12 weeks    Certification date from 11/08/24 to 01/31/25         See note for plan of treatment details and functional goals     Rita Oreilly PT                         I CERTIFY THE NEED FOR THESE SERVICES FURNISHED UNDER        THIS PLAN OF TREATMENT AND WHILE UNDER MY CARE     (Physician attestation of this document indicates review and certification of the therapy plan).              Referring Provider:  Karla Urias    Initial Assessment  See Epic Evaluation- Start of Care Date: 11/08/24

## 2024-11-18 ENCOUNTER — ALLIED HEALTH/NURSE VISIT (OUTPATIENT)
Dept: FAMILY MEDICINE | Facility: CLINIC | Age: 69
End: 2024-11-18
Payer: MEDICARE

## 2024-11-18 DIAGNOSIS — E53.8 VITAMIN B 12 DEFICIENCY: Primary | ICD-10-CM

## 2024-11-18 PROCEDURE — 99207 PR NO CHARGE NURSE ONLY: CPT

## 2024-11-18 RX ADMIN — CYANOCOBALAMIN 1000 MCG: 1000 INJECTION, SOLUTION INTRAMUSCULAR; SUBCUTANEOUS at 08:05

## 2024-11-18 NOTE — PROGRESS NOTES
Clinic Administered Medication Documentation      Injectable Medication Documentation    Is there an active order (written within the past 365 days, with administrations remaining, not ) in the chart? Yes.     Patient was given Cyanocobalamin (B-12). Prior to medication administration, verified patient's identity using patient s name and date of birth. Please see MAR and medication order for additional information. Patient instructed to report any adverse reaction to staff immediately.    Vial/Syringe: Single dose vial. Was entire vial of medication used? Yes  Was this medication supplied by the patient? No  Is this a medication the patient will need to receive again? Yes. Verified that the patient has refills remaining in their prescription.  Amelie Cruz MA

## 2024-12-02 ENCOUNTER — THERAPY VISIT (OUTPATIENT)
Dept: PHYSICAL THERAPY | Facility: CLINIC | Age: 69
End: 2024-12-02
Payer: MEDICARE

## 2024-12-02 DIAGNOSIS — M25.551 HIP PAIN, RIGHT: Primary | ICD-10-CM

## 2024-12-02 PROCEDURE — 97110 THERAPEUTIC EXERCISES: CPT | Mod: GP | Performed by: PHYSICAL THERAPIST

## 2024-12-12 ENCOUNTER — LAB (OUTPATIENT)
Dept: LAB | Facility: CLINIC | Age: 69
End: 2024-12-12
Payer: MEDICARE

## 2024-12-12 DIAGNOSIS — Z79.899 CURRENT USE OF PROTON PUMP INHIBITOR: ICD-10-CM

## 2024-12-12 DIAGNOSIS — R79.89 LOW SERUM VITAMIN D: ICD-10-CM

## 2024-12-12 LAB — VIT D+METAB SERPL-MCNC: 74 NG/ML (ref 20–50)

## 2024-12-16 ENCOUNTER — MYC REFILL (OUTPATIENT)
Dept: PEDIATRICS | Facility: CLINIC | Age: 69
End: 2024-12-16
Payer: MEDICARE

## 2024-12-16 ENCOUNTER — TRANSFERRED RECORDS (OUTPATIENT)
Dept: HEALTH INFORMATION MANAGEMENT | Facility: CLINIC | Age: 69
End: 2024-12-16

## 2024-12-16 ENCOUNTER — E-VISIT (OUTPATIENT)
Dept: FAMILY MEDICINE | Facility: CLINIC | Age: 69
End: 2024-12-16
Payer: MEDICARE

## 2024-12-16 DIAGNOSIS — R79.89 LOW VITAMIN B12 LEVEL: ICD-10-CM

## 2024-12-16 RX ORDER — CYANOCOBALAMIN 1000 UG/ML
1000 INJECTION, SOLUTION INTRAMUSCULAR; SUBCUTANEOUS
Qty: 2 ML | Refills: 1 | Status: SHIPPED | OUTPATIENT
Start: 2024-12-16

## 2024-12-16 RX ORDER — CYANOCOBALAMIN 1000 UG/ML
1000 INJECTION, SOLUTION INTRAMUSCULAR; SUBCUTANEOUS
Qty: 1 ML | Refills: 11 | OUTPATIENT
Start: 2024-12-16

## 2024-12-18 ENCOUNTER — ALLIED HEALTH/NURSE VISIT (OUTPATIENT)
Dept: FAMILY MEDICINE | Facility: CLINIC | Age: 69
End: 2024-12-18
Payer: MEDICARE

## 2024-12-18 DIAGNOSIS — R79.89 LOW VITAMIN B12 LEVEL: Primary | ICD-10-CM

## 2024-12-18 RX ADMIN — CYANOCOBALAMIN 1000 MCG: 1000 INJECTION, SOLUTION INTRAMUSCULAR; SUBCUTANEOUS at 08:55

## 2024-12-18 NOTE — NURSING NOTE
Pt states she is heading to [place] for the next 3 months and will be self administering medication. Next clinic appt scheduled for March when she will be back.

## 2025-01-06 ENCOUNTER — THERAPY VISIT (OUTPATIENT)
Dept: PHYSICAL THERAPY | Facility: CLINIC | Age: 70
End: 2025-01-06
Payer: MEDICARE

## 2025-01-06 ENCOUNTER — TRANSCRIBE ORDERS (OUTPATIENT)
Dept: OTHER | Age: 70
End: 2025-01-06

## 2025-01-06 ENCOUNTER — TELEPHONE (OUTPATIENT)
Dept: FAMILY MEDICINE | Facility: CLINIC | Age: 70
End: 2025-01-06

## 2025-01-06 DIAGNOSIS — H81.13 BENIGN PAROXYSMAL POSITIONAL VERTIGO, BILATERAL: Primary | ICD-10-CM

## 2025-01-06 DIAGNOSIS — R42 VERTIGO: Primary | ICD-10-CM

## 2025-01-06 DIAGNOSIS — H81.11 BENIGN PAROXYSMAL POSITIONAL VERTIGO, RIGHT: Primary | ICD-10-CM

## 2025-01-06 DIAGNOSIS — H81.11 BENIGN PAROXYSMAL POSITIONAL VERTIGO, RIGHT: ICD-10-CM

## 2025-01-06 PROCEDURE — 95992 CANALITH REPOSITIONING PROC: CPT | Mod: GP | Performed by: PHYSICAL THERAPIST

## 2025-01-06 PROCEDURE — 97161 PT EVAL LOW COMPLEX 20 MIN: CPT | Mod: GP | Performed by: PHYSICAL THERAPIST

## 2025-01-06 NOTE — TELEPHONE ENCOUNTER
Outgoing call to patient - attempt #1. Left  requesting call back to [phone] and ask to speak with a nurse. Awaiting call back at this time.     RN sent NICO message advising patient to start eVisit.     Janina VITALE RN  [facility]

## 2025-01-06 NOTE — PROGRESS NOTES
PHYSICAL THERAPY EVALUATION  Type of Visit: Evaluation        Fall Risk Screen:  Fall screen completed by: PT  Have you fallen 2 or more times in the past year?: (Patient-Rptd) No  Have you fallen and had an injury in the past year?: (Patient-Rptd) No  Is patient a fall risk?: Yes  Fall screen comments: Dizziness    Subjective         Presenting condition or subjective complaint: (Patient-Rptd) Vertigo  Date of onset: 01/05/25    Relevant medical history:     Dates & types of surgery: (Patient-Rptd) Hysterectomy, Gallbladder removal    Prior diagnostic imaging/testing results:       Prior therapy history for the same diagnosis, illness or injury: (Patient-Rptd) Yes (Patient-Rptd) 11/2022. Patient was treated for right PC BPPV in 11/2022.     Prior Level of Function  Transfers: Independent  Ambulation: Independent  ADL: Independent  IADL: Childcare, Driving, Finances, Housekeeping, Laundry, Meal preparation, Medication management    Living Environment  Social support: (Patient-Rptd) With a significant other or spouse   Type of home: (Patient-Rptd) House   Stairs to enter the home: (Patient-Rptd) No       Ramp: (Patient-Rptd) No   Stairs inside the home: (Patient-Rptd) Yes (Patient-Rptd) 15 Is there a railing: (Patient-Rptd) Yes     Help at home: (Patient-Rptd) None  Equipment owned:       Employment: (Patient-Rptd) No    Hobbies/Interests:      Patient goals for therapy: (Patient-Rptd) Not feel dizxy    Pain assessment: Pain denied     Objective      SENSATION:  No changes per patient report       VESTIBULAR EVALUATION  ADDITIONAL HISTORY: patient notes yesterday she was lying on her back yesterday with her feet up, got up and felt vertigo. She tried to some maneuvers on the bed, didn't help. She took Bonine last night around 7:30 pm, did help with some nausea but she is getting some now.   Description of symptoms: (Patient-Rptd) I feel like I am spinning; Attacks of dizziness; I feel like the room is spinning  Dizzy  attacks:   Start: (Patient-Rptd) Yesterday   Last attack: (Patient-Rptd) This morning   Frequency of occurrences:     Length of attack:    Difficulty hearing:    Noise in ears? (Patient-Rptd) Yes (Patient-Rptd) Ringing  Alleviates symptoms: (Patient-Rptd) If I lower my head to the right things  start spinning  Worsens symptoms: (Patient-Rptd) Lying on my right side  Activities that bring on symptoms: (Patient-Rptd) Bending over       Pertinent visual history: No acute changes  Pertinent history of current vestibular problem: Anxiety, Motion sickness  DHI: Total Score: (Patient-Rptd) 24    Cervicogenic Screen    Neck ROM WFL    Vertebral Artery Test    Alar Ligament Test    Transverse Ligament Test    Distraction    Neck Torsion Test (head still, body rotating)    Neck Torsion Test (head and body rotating)         Infrared Goggle Exam Vestibular Suppressant in Last 24 Hours? No  Exam Completed With: Infrared goggles   Spontaneous Nystagmus Negative   Gaze Evoked Nystagmus    Head Shake Horizontal Nystagmus    Positional Testing    Supine Head-Hanging Test     Left Right   Debora-Hallpike Upbeating, very mild, asymptomatic, long duration Upbeating R torsional, strong burst of nystagmus about 15 seconds in duration and concurrent symptoms, increased neck guarding and apprehension observed. Repeat assessment on second and third test with mild but nearly 60 second duration of nystagmus, onset after 5-6 second delay   Sidelying Test     [facility] Supine Roll Test Upbeating, very mild, asymptomatic, long duration Upbeating R torsional, strong burst of nystagmus and concurrent symptoms, increased neck guarding and apprehension observed   [facility] Forward Roll Test     [place] and Lean Test -  Sitting Erect    [place] and Lean Test - Seated, Head Bent 60 Degrees Forward    [place] and Lean Test - Seated, Head Bent Backwards       BPPV Canal(s): R Posterior  BPPV Type: Canalithasis    Assessment & Plan   CLINICAL IMPRESSIONS  Medical Diagnosis:  Benign paroxysmal positional vertigo, right (H81.11    Treatment Diagnosis: s/s BPPV   Impression/Assessment: Patient is a 69 year old female with dizziness complaints.  The following significant findings have been identified: Instability, Dizziness, and Disequilibrium . These impairments interfere with their ability to perform self care tasks, work tasks, recreational activities, household chores, household mobility, and community mobility as compared to previous level of function.     Clinical Decision Making (Complexity):  Clinical Presentation: Stable/Uncomplicated  Clinical Presentation Rationale: based on medical and personal factors listed in PT evaluation  Clinical Decision Making (Complexity): Low complexity    PLAN OF CARE  Treatment Interventions:  Interventions: Gait Training, Manual Therapy, Neuromuscular Re-education, Therapeutic Activity, Therapeutic Exercise, Self-Care/Home Management, Canalith Repositioning    Long Term Goals     PT Goal 1  Goal Identifier: DHI  Goal Description: Patient will complete the DHI with a score of <6/100 to demonstrate decreased perception of handicap and improved quality of life.  Rationale: to maximize safety and independence with performance of ADLs and functional tasks;to maximize safety and independence within the home;to maximize safety and independence with self cares;to maximize safety and independence within the community  Goal Progress: Eval: 24/100  Target Date: 02/03/25  PT Goal 2  Goal Identifier: Position changes  Goal Description: Patient will deny dizziness with change of body position for independent bed mobility and transfers for return to daily activities without limitation.  Rationale: to maximize safety and independence with performance of ADLs and functional tasks;to maximize safety and independence within the home;to maximize safety and independence with self cares  Goal Progress: Eval: s/s R BPPV  Target Date: 02/03/25      Frequency of Treatment:  Up to 4 sessions, however planning to winter in [place] starting tomorrow  Duration of Treatment: 4 weeks    Recommended Referrals to Other Professionals:   Education Assessment:   Learner/Method: Patient;Demonstration;Pictures/Video  Education Comments: Exam findings, POC, mechanism and treatment of BPPV    Risks and benefits of evaluation/treatment have been explained.   Patient/Family/caregiver agrees with Plan of Care.     Evaluation Time:     PT Eval, Low Complexity Minutes (11361): 10       Signing Clinician: BALA Nolan [facility]                                                                                   OUTPATIENT PHYSICAL THERAPY      PLAN OF TREATMENT FOR OUTPATIENT REHABILITATION   Patient's Last Name, First Name, Luba Casas YOB: 1955   Provider's Name   [facility]   Medical Record No.  [id number]     Onset Date: 01/05/25  Start of Care Date: 01/06/25     Medical Diagnosis:  Benign paroxysmal positional vertigo, right (H81.11      PT Treatment Diagnosis:  s/s BPPV Plan of Treatment  Frequency/Duration: Up to 4 sessions, however planning to winter in [place] starting tomorrow/ 4 weeks    Certification date from 01/06/25 to 02/03/25         See note for plan of treatment details and functional goals     Rita Tijerina, PT                         I CERTIFY THE NEED FOR THESE SERVICES FURNISHED UNDER        THIS PLAN OF TREATMENT AND WHILE UNDER MY CARE     (Physician attestation of this document indicates review and certification of the therapy plan).              Referring Provider:  Karla Urias    Initial Assessment  See Epic Evaluation- Start of Care Date: 01/06/25

## 2025-01-06 NOTE — TELEPHONE ENCOUNTER
Just seeing this message. She does need an e-visit for a referral. Can be completed today but unsure if she is able to get seen today still by physical therapy?    Thanks!  Karla Urias PA-C

## 2025-01-06 NOTE — TELEPHONE ENCOUNTER
RN chart review     Patient did review my chart advising to complete Evisit     Closing this encounter     Josy Blanca, Registered Nurse  [facility]

## 2025-01-06 NOTE — TELEPHONE ENCOUNTER
Pt calling for referral for PT for vertigo. Pt reporting she is going on a trip tomorrow and will have to cancel her trip if she can't be seen with PT.   PT is holding an apt spot for her at 10:15 am and requesting the referral be faxed in the next 15 minutes.     Advised pt that a referral requires an e-visit and that we would be unable to send a referral in the next 15 minutes.     Pt requesting we ask Karla Urias to see if she will send a referral without an e-visit. Advised pt I would send a message but that it would still not be completed in the next 15 minutes.      Karla- please advise. Referral pended below if appropriate.     Fax: [phone]    Brittani Ceron RN

## 2025-01-08 PROBLEM — M25.551 HIP PAIN, RIGHT: Status: RESOLVED | Noted: 2024-12-02 | Resolved: 2025-01-08

## 2025-02-05 ENCOUNTER — MYC MEDICAL ADVICE (OUTPATIENT)
Dept: FAMILY MEDICINE | Facility: CLINIC | Age: 70
End: 2025-02-05
Payer: MEDICARE

## 2025-03-18 ENCOUNTER — ALLIED HEALTH/NURSE VISIT (OUTPATIENT)
Dept: FAMILY MEDICINE | Facility: CLINIC | Age: 70
End: 2025-03-18
Payer: MEDICARE

## 2025-03-18 DIAGNOSIS — E53.8 VITAMIN B 12 DEFICIENCY: Primary | ICD-10-CM

## 2025-03-18 PROCEDURE — 99207 PR NO CHARGE NURSE ONLY: CPT

## 2025-03-18 RX ADMIN — CYANOCOBALAMIN 1000 MCG: 1000 INJECTION, SOLUTION INTRAMUSCULAR; SUBCUTANEOUS at 09:34

## 2025-03-18 NOTE — PROGRESS NOTES
Clinic Administered Medication Documentation      Injectable Medication Documentation    Is there an active order (written within the past 365 days, with administrations remaining, not ) in the chart? Yes.     Patient was given Cyanocobalamin (B-12). Prior to medication administration, verified patient's identity using patient s name and date of birth. Please see MAR and medication order for additional information. Patient instructed to remain in clinic for 15 minutes and report any adverse reaction to staff immediately.    Vial/Syringe: Single dose vial. Was entire vial of medication used? Yes  Was this medication supplied by the patient? No  Is this a medication the patient will need to receive again? Yes. Verified that the patient has refills remaining in their prescription.    Sarah Mccormick [facility]

## 2025-03-28 ENCOUNTER — TRANSFERRED RECORDS (OUTPATIENT)
Dept: HEALTH INFORMATION MANAGEMENT | Facility: CLINIC | Age: 70
End: 2025-03-28

## 2025-03-28 PROBLEM — E55.9 VITAMIN D DEFICIENCY: Status: ACTIVE | Noted: 2025-03-28

## 2025-04-03 ENCOUNTER — TRANSFERRED RECORDS (OUTPATIENT)
Dept: HEALTH INFORMATION MANAGEMENT | Facility: CLINIC | Age: 70
End: 2025-04-03
Payer: MEDICARE

## 2025-04-08 ENCOUNTER — THERAPY VISIT (OUTPATIENT)
Dept: PHYSICAL THERAPY | Facility: CLINIC | Age: 70
End: 2025-04-08
Payer: MEDICARE

## 2025-04-08 DIAGNOSIS — H81.11 BENIGN PAROXYSMAL POSITIONAL VERTIGO, RIGHT: Primary | ICD-10-CM

## 2025-04-08 PROCEDURE — 97112 NEUROMUSCULAR REEDUCATION: CPT | Mod: GP | Performed by: PHYSICAL THERAPIST

## 2025-04-08 NOTE — PATIENT INSTRUCTIONS
Physical therapy recommends follow up with ENT due to upbeat nystagmus in left and right Debora Hallpike. Nystagmus is less intense after resolution of right BPPV on 4/8/25 but Dawna still has a mild upbeat nystagmus. She stopped taking her daily multivitamin about 1 week prior to the 4/8/25 visit and notes improved headaches, but this constant headache was also a concern for referral to ENT.       Rita Tijerina, PT, DPT  [facility]

## 2025-04-16 ENCOUNTER — ALLIED HEALTH/NURSE VISIT (OUTPATIENT)
Dept: FAMILY MEDICINE | Facility: CLINIC | Age: 70
End: 2025-04-16
Payer: MEDICARE

## 2025-04-16 DIAGNOSIS — E53.8 VITAMIN B 12 DEFICIENCY: Primary | ICD-10-CM

## 2025-04-16 PROCEDURE — 99207 PR NO CHARGE NURSE ONLY: CPT

## 2025-04-16 RX ADMIN — CYANOCOBALAMIN 1000 MCG: 1000 INJECTION, SOLUTION INTRAMUSCULAR; SUBCUTANEOUS at 09:56

## 2025-04-16 NOTE — PROGRESS NOTES
Clinic Administered Medication Documentation      Injectable Medication Documentation    Is there an active order (written within the past 365 days, with administrations remaining, not ) in the chart? Yes.     Patient was given Cyanocobalamin (B-12). Prior to medication administration, verified patient's identity using patient s name and date of birth. Please see MAR and medication order for additional information. Patient instructed to remain in clinic for 15 minutes and report any adverse reaction to staff immediately.    Vial/Syringe: Single dose vial. Was entire vial of medication used? Yes  Was this medication supplied by the patient? No  Is this a medication the patient will need to receive again? Yes. Verified that the patient has refills remaining in their prescription.    Sarah Mccormick [facility]

## 2025-04-28 ENCOUNTER — MYC MEDICAL ADVICE (OUTPATIENT)
Dept: FAMILY MEDICINE | Facility: CLINIC | Age: 70
End: 2025-04-28
Payer: MEDICARE

## 2025-05-19 ENCOUNTER — ALLIED HEALTH/NURSE VISIT (OUTPATIENT)
Dept: FAMILY MEDICINE | Facility: CLINIC | Age: 70
End: 2025-05-19
Payer: MEDICARE

## 2025-05-19 DIAGNOSIS — E53.8 VITAMIN B 12 DEFICIENCY: Primary | ICD-10-CM

## 2025-05-19 PROCEDURE — 99207 PR NO CHARGE NURSE ONLY: CPT

## 2025-05-19 RX ADMIN — CYANOCOBALAMIN 1000 MCG: 1000 INJECTION, SOLUTION INTRAMUSCULAR; SUBCUTANEOUS at 08:59

## 2025-06-04 LAB — TSH SERPL-ACNC: 7.12 UIU/ML (ref 0.47–4.68)

## 2025-06-30 ENCOUNTER — MYC REFILL (OUTPATIENT)
Dept: FAMILY MEDICINE | Facility: CLINIC | Age: 70
End: 2025-06-30
Payer: MEDICARE

## 2025-06-30 ENCOUNTER — MYC MEDICAL ADVICE (OUTPATIENT)
Dept: FAMILY MEDICINE | Facility: CLINIC | Age: 70
End: 2025-06-30
Payer: MEDICARE

## 2025-06-30 DIAGNOSIS — Z78.0 MENOPAUSE: ICD-10-CM

## 2025-06-30 DIAGNOSIS — Z79.899 CURRENT USE OF PROTON PUMP INHIBITOR: ICD-10-CM

## 2025-06-30 DIAGNOSIS — R79.89 LOW SERUM VITAMIN D: ICD-10-CM

## 2025-06-30 NOTE — TELEPHONE ENCOUNTER
RN spoke to patient - see my chart     Explained that we have received multiple refill requests for premarin   We request 72 business hours to process refills,, multiple requests delays all patient's refills getting processed   Advised that we did receive the request and it will be processed     Suggested in the future for patient to only call pharmacy to request refill to prevent duplicates     Josy Blanca Registered Nurse  [facility]

## 2025-07-01 RX ORDER — CONJUGATED ESTROGENS 0.62 MG/1
0.31 TABLET, FILM COATED ORAL DAILY
Qty: 45 TABLET | Refills: 0 | Status: SHIPPED | OUTPATIENT
Start: 2025-07-01

## 2025-07-01 RX ORDER — MAGNESIUM GLYCINATE 100 MG
CAPSULE ORAL
Qty: 12 CAPSULE | Refills: 2 | OUTPATIENT
Start: 2025-07-01

## 2025-07-07 ENCOUNTER — ANCILLARY PROCEDURE (OUTPATIENT)
Dept: BONE DENSITY | Facility: CLINIC | Age: 70
End: 2025-07-07
Payer: MEDICARE

## 2025-07-07 DIAGNOSIS — M85.80 OSTEOPENIA, UNSPECIFIED LOCATION: ICD-10-CM

## 2025-07-07 DIAGNOSIS — Z78.0 ASYMPTOMATIC POSTMENOPAUSAL STATE: ICD-10-CM

## 2025-07-07 PROCEDURE — 77080 DXA BONE DENSITY AXIAL: CPT | Mod: TC | Performed by: RADIOLOGY

## 2025-07-21 ENCOUNTER — ALLIED HEALTH/NURSE VISIT (OUTPATIENT)
Dept: FAMILY MEDICINE | Facility: CLINIC | Age: 70
End: 2025-07-21
Payer: MEDICARE

## 2025-07-21 DIAGNOSIS — E53.8 VITAMIN B 12 DEFICIENCY: Primary | ICD-10-CM

## 2025-07-21 DIAGNOSIS — R79.89 LOW VITAMIN B12 LEVEL: ICD-10-CM

## 2025-07-21 PROCEDURE — 96372 THER/PROPH/DIAG INJ SC/IM: CPT | Performed by: PHYSICIAN ASSISTANT

## 2025-07-21 PROCEDURE — 99207 PR NO CHARGE NURSE ONLY: CPT

## 2025-07-21 RX ORDER — CYANOCOBALAMIN 1000 UG/ML
1000 INJECTION, SOLUTION INTRAMUSCULAR; SUBCUTANEOUS
Status: COMPLETED | OUTPATIENT
Start: 2025-07-21 | End: 2025-07-21

## 2025-07-21 RX ORDER — CYANOCOBALAMIN 1000 UG/ML
1000 INJECTION, SOLUTION INTRAMUSCULAR; SUBCUTANEOUS
Status: ACTIVE | OUTPATIENT
Start: 2025-08-20 | End: 2026-08-14

## 2025-07-21 RX ADMIN — CYANOCOBALAMIN 1000 MCG: 1000 INJECTION, SOLUTION INTRAMUSCULAR; SUBCUTANEOUS at 09:57

## 2025-07-21 NOTE — PROGRESS NOTES
Patient was given a new Prescription for B12 by her PCP. Scheduled patient for next b12 injection and informed patient. Patient does not have any questions at this time.     Sarah Mccormick [facility]

## 2025-07-21 NOTE — PROGRESS NOTES
Clinic Administered Medication Documentation        Patient was given B12 injection. Prior to medication administration, verified patient's identity using patient s name and date of birth. Please see MAR and medication order for additional information. Patient instructed to remain in clinic for 15 minutes and report any adverse reaction to staff immediately.    Vial/Syringe: Single dose vial. Was entire vial of medication used? Yes      Patient did not have an active order for B12 injection due to it expiring prior to patient's appointment. Requested provider of the day to order one for today. Spoke to the patient who normally schedules her B12 injection appointments will schedule the next one and inform her once we get an active order from her PCP. PCP was informed and requested to renew B12 injection order. PCP agreed and will create a renewal. Once that is completed writer will schedule next B12 injection. Patient requested to have her next appointment on Augst 20th between 8:00am and 9:00am.     Sarah Mccormick [facility]

## 2025-07-29 ENCOUNTER — MYC MEDICAL ADVICE (OUTPATIENT)
Dept: FAMILY MEDICINE | Facility: CLINIC | Age: 70
End: 2025-07-29
Payer: MEDICARE

## 2025-07-29 NOTE — TELEPHONE ENCOUNTER
Called patient to discuss mychart message.  She has been having urinary frequency the last few weeks.  Fatigued for the last few weeks.  Last week she was constipated and this week having diarrhea.  Had some grey stools.  Scheduled 7/31/25 1:40 pm with Julius Francis RN

## 2025-07-31 ENCOUNTER — OFFICE VISIT (OUTPATIENT)
Dept: FAMILY MEDICINE | Facility: CLINIC | Age: 70
End: 2025-07-31
Payer: MEDICARE

## 2025-07-31 VITALS
TEMPERATURE: 98.5 F | WEIGHT: 175.2 LBS | BODY MASS INDEX: 27.5 KG/M2 | HEIGHT: 67 IN | DIASTOLIC BLOOD PRESSURE: 85 MMHG | RESPIRATION RATE: 12 BRPM | SYSTOLIC BLOOD PRESSURE: 150 MMHG | HEART RATE: 77 BPM | OXYGEN SATURATION: 97 %

## 2025-07-31 DIAGNOSIS — N39.41 URGE INCONTINENCE OF URINE: Primary | ICD-10-CM

## 2025-07-31 DIAGNOSIS — R10.12 LUQ ABDOMINAL PAIN: ICD-10-CM

## 2025-07-31 DIAGNOSIS — R10.11 RUQ ABDOMINAL PAIN: ICD-10-CM

## 2025-07-31 LAB
ALBUMIN UR-MCNC: NEGATIVE MG/DL
APPEARANCE UR: CLEAR
BASOPHILS # BLD AUTO: 0 10E3/UL (ref 0–0.2)
BASOPHILS NFR BLD AUTO: 0 %
BILIRUB UR QL STRIP: NEGATIVE
CLUE CELLS: ABNORMAL
COLOR UR AUTO: YELLOW
EOSINOPHIL # BLD AUTO: 0 10E3/UL (ref 0–0.7)
EOSINOPHIL NFR BLD AUTO: 1 %
ERYTHROCYTE [DISTWIDTH] IN BLOOD BY AUTOMATED COUNT: 12.3 % (ref 10–15)
GLUCOSE UR STRIP-MCNC: NEGATIVE MG/DL
HCT VFR BLD AUTO: 42.3 % (ref 35–47)
HGB BLD-MCNC: 13.9 G/DL (ref 11.7–15.7)
HGB UR QL STRIP: NEGATIVE
IMM GRANULOCYTES # BLD: 0 10E3/UL
IMM GRANULOCYTES NFR BLD: 0 %
KETONES UR STRIP-MCNC: ABNORMAL MG/DL
LEUKOCYTE ESTERASE UR QL STRIP: NEGATIVE
LYMPHOCYTES # BLD AUTO: 2.4 10E3/UL (ref 0.8–5.3)
LYMPHOCYTES NFR BLD AUTO: 34 %
MCH RBC QN AUTO: 30.1 PG (ref 26.5–33)
MCHC RBC AUTO-ENTMCNC: 32.9 G/DL (ref 31.5–36.5)
MCV RBC AUTO: 92 FL (ref 78–100)
MONOCYTES # BLD AUTO: 0.3 10E3/UL (ref 0–1.3)
MONOCYTES NFR BLD AUTO: 5 %
NEUTROPHILS # BLD AUTO: 4.2 10E3/UL (ref 1.6–8.3)
NEUTROPHILS NFR BLD AUTO: 60 %
NITRATE UR QL: NEGATIVE
PH UR STRIP: 5 [PH] (ref 5–7)
PLATELET # BLD AUTO: 214 10E3/UL (ref 150–450)
RBC # BLD AUTO: 4.62 10E6/UL (ref 3.8–5.2)
SP GR UR STRIP: 1.02 (ref 1–1.03)
TRICHOMONAS, WET PREP: ABNORMAL
UROBILINOGEN UR STRIP-ACNC: 0.2 E.U./DL
WBC # BLD AUTO: 7 10E3/UL (ref 4–11)
WBC'S/HIGH POWER FIELD, WET PREP: ABNORMAL
YEAST, WET PREP: ABNORMAL

## 2025-07-31 ASSESSMENT — ENCOUNTER SYMPTOMS: DIARRHEA: 1

## 2025-07-31 ASSESSMENT — PAIN SCALES - GENERAL: PAINLEVEL_OUTOF10: MILD PAIN (3)

## 2025-07-31 NOTE — PROGRESS NOTES
Assessment & Plan     (N39.41) Urge incontinence of urine  (primary encounter diagnosis)  Comment: wet prep negative. Will get UA to evaluate for UTI given acute onset of increased urgency and frequency. Will follow up on results and whether further direction is necessary. Push fluids in the meantime. Patient fully understands and is agreeable with plan of care, at this point patient will follow up as needed unless acute concerns arise in the meantime.  Plan: Wet prep - Clinic Collect, UA Macroscopic with         reflex to Microscopic and Culture - Lab Collect    (R10.11) RUQ abdominal pain  (R10.12) LUQ abdominal pain  Comment: noted on exam. Patient leaving for [place] Saturday. Will evaluate w/ CMP, CBC, and lipase to ensure stable. Will follow up on results and whether further direction is necessary. Patient fully understands and is agreeable with plan of care, at this point patient will follow up as needed unless acute concerns arise in the meantime.  Plan: Comprehensive metabolic panel (BMP + Alb, Alk         Phos, ALT, AST, Total. Bili, TP), CBC with         platelets and differential, Lipase      Subjective   Dawna is a 69 year old, presenting for the following health issues:  URI (Urinary frequency // fatigue //) and Diarrhea (Loose stools)        7/31/2025     1:40 PM   Additional Questions   Roomed by matt DE OLIVEIRA    Diarrhea    History of Present Illness       Reason for visit:  New symptoms wondering if it s due to new medication  Symptoms include:  Frequent urination, diarrhea with light colored stools alternating with constipation, feeling more tired than usual  Symptom progression:  Improving  Had these symptoms before:  No  What makes it worse:  Not getting enough sleep   She is taking medications regularly.        Pain History:  When did you first notice your pain? This week // off and on   Have you seen anyone else for your pain? No  How has your pain affected your ability to work?  "  Where in your body do you have pain? Abdominal/Flank Pain  Onset/Duration: about a week now  Description:   Character: Dull ache  Location: right lower quadrant  Radiation: also middle   Intensity: 3/10  Progression of Symptoms:  waxing and waning  Accompanying Signs & Symptoms:  Fever/Chills: No  Gas/Bloating: YES  Nausea: No  Vomitting: No  Diarrhea: YES  Constipation: YES  Dysuria or Hematuria: YES  History:   Trauma: No  Previous similar pain: No  Previous tests done:   Precipitating factors:   Does the pain change with:     Food: No    Bowel Movement: change in stools - pale    Urination: YES   Other factors:  would like to make sure not appendicitis     Therapies tried and outcome:       No LMP recorded (lmp unknown). Patient has had a hysterectomy.      Review of Systems  Constitutional, HEENT, cardiovascular, pulmonary, gi and gu systems are negative, except as otherwise noted.      Objective    BP (!) 150/85 (BP Location: Right arm, Patient Position: Sitting, Cuff Size: Adult Large)   Pulse 77   Temp 98.5  F (36.9  C) (Oral)   Resp 12   Ht 1.689 m (5' 6.5\")   Wt 79.5 kg (175 lb 3.2 oz)   LMP  (LMP Unknown)   SpO2 97%   BMI 27.85 kg/m    Body mass index is 27.85 kg/m .  Physical Exam  Vitals and nursing note reviewed.   Constitutional:       General: She is not in acute distress.     Appearance: Normal appearance. She is well-developed. She is not ill-appearing, toxic-appearing or diaphoretic.   Cardiovascular:      Rate and Rhythm: Normal rate and regular rhythm.      Pulses: Normal pulses.      Heart sounds: Normal heart sounds, S1 normal and S2 normal. No murmur heard.     No friction rub. No gallop. No S3 or S4 sounds.   Pulmonary:      Effort: Pulmonary effort is normal. No respiratory distress.      Breath sounds: Normal breath sounds. No stridor. No wheezing, rhonchi or rales.   Chest:      Chest wall: No tenderness.   Abdominal:      General: Bowel sounds are normal. There is no distension. "      Palpations: Abdomen is soft. There is no mass.      Tenderness: There is abdominal tenderness in the right upper quadrant and left upper quadrant. There is no right CVA tenderness, left CVA tenderness, guarding or rebound.   Skin:     General: Skin is warm and dry.   Neurological:      General: No focal deficit present.      Mental Status: She is alert and oriented to person, place, and time.      Motor: No abnormal muscle tone.      Deep Tendon Reflexes: Reflexes are normal and symmetric.   Psychiatric:         Mood and Affect: Mood normal.         Behavior: Behavior normal.         Thought Content: Thought content normal.         Judgment: Judgment normal.            Recent Results (from the past 24 hours)   Wet prep - Clinic Collect    Specimen: Vagina; Swab   Result Value Ref Range    Trichomonas Absent Absent    Yeast Absent Absent    Clue Cells Absent Absent    WBCs/high power field 2+ (A) None           Signed Electronically by: DAVID Neumann CNP

## 2025-08-20 ENCOUNTER — ALLIED HEALTH/NURSE VISIT (OUTPATIENT)
Dept: FAMILY MEDICINE | Facility: CLINIC | Age: 70
End: 2025-08-20
Payer: MEDICARE

## 2025-08-20 DIAGNOSIS — R79.89 LOW VITAMIN B12 LEVEL: Primary | ICD-10-CM

## 2025-08-20 PROCEDURE — 96372 THER/PROPH/DIAG INJ SC/IM: CPT

## 2025-08-20 RX ADMIN — CYANOCOBALAMIN 1000 MCG: 1000 INJECTION, SOLUTION INTRAMUSCULAR; SUBCUTANEOUS at 08:09

## 2025-09-02 ENCOUNTER — MYC REFILL (OUTPATIENT)
Dept: FAMILY MEDICINE | Facility: CLINIC | Age: 70
End: 2025-09-02
Payer: MEDICARE

## 2025-09-02 DIAGNOSIS — E78.5 HYPERLIPIDEMIA, UNSPECIFIED HYPERLIPIDEMIA TYPE: ICD-10-CM

## 2025-09-02 DIAGNOSIS — M54.2 NECK PAIN: ICD-10-CM

## 2025-09-02 RX ORDER — CYCLOBENZAPRINE HCL 5 MG
5 TABLET ORAL 3 TIMES DAILY PRN
Qty: 20 TABLET | Refills: 0 | Status: SHIPPED | OUTPATIENT
Start: 2025-09-02

## 2025-09-03 RX ORDER — ROSUVASTATIN CALCIUM 5 MG/1
5 TABLET, COATED ORAL DAILY
Qty: 90 TABLET | Refills: 1 | Status: SHIPPED | OUTPATIENT
Start: 2025-09-03